# Patient Record
Sex: MALE | NOT HISPANIC OR LATINO | Employment: OTHER | URBAN - METROPOLITAN AREA
[De-identification: names, ages, dates, MRNs, and addresses within clinical notes are randomized per-mention and may not be internally consistent; named-entity substitution may affect disease eponyms.]

---

## 2017-04-03 DIAGNOSIS — F02.80 ALZHEIMER'S DEMENTIA WITHOUT BEHAVIORAL DISTURBANCE: ICD-10-CM

## 2017-04-03 DIAGNOSIS — G30.9 ALZHEIMER'S DEMENTIA WITHOUT BEHAVIORAL DISTURBANCE: ICD-10-CM

## 2017-04-03 RX ORDER — MEMANTINE HYDROCHLORIDE 10 MG/1
TABLET ORAL
Qty: 60 TABLET | Refills: 11 | Status: SHIPPED | OUTPATIENT
Start: 2017-04-03

## 2017-04-28 ENCOUNTER — OFFICE VISIT (OUTPATIENT)
Dept: NEUROLOGY | Facility: CLINIC | Age: 72
End: 2017-04-28
Payer: MEDICARE

## 2017-04-28 VITALS
HEART RATE: 59 BPM | WEIGHT: 189.63 LBS | DIASTOLIC BLOOD PRESSURE: 77 MMHG | SYSTOLIC BLOOD PRESSURE: 136 MMHG | BODY MASS INDEX: 26.55 KG/M2 | HEIGHT: 71 IN

## 2017-04-28 DIAGNOSIS — F41.8 DEPRESSION WITH ANXIETY: ICD-10-CM

## 2017-04-28 DIAGNOSIS — F01.50 MIXED ALZHEIMER'S AND VASCULAR DEMENTIA: ICD-10-CM

## 2017-04-28 DIAGNOSIS — G62.9 NEUROPATHY: ICD-10-CM

## 2017-04-28 DIAGNOSIS — G30.9 MIXED ALZHEIMER'S AND VASCULAR DEMENTIA: ICD-10-CM

## 2017-04-28 DIAGNOSIS — F02.80 MIXED ALZHEIMER'S AND VASCULAR DEMENTIA: ICD-10-CM

## 2017-04-28 DIAGNOSIS — R26.9 GAIT DISORDER: Primary | ICD-10-CM

## 2017-04-28 PROCEDURE — 1159F MED LIST DOCD IN RCRD: CPT | Mod: S$GLB,,, | Performed by: PSYCHIATRY & NEUROLOGY

## 2017-04-28 PROCEDURE — 99999 PR PBB SHADOW E&M-EST. PATIENT-LVL III: CPT | Mod: PBBFAC,,, | Performed by: PSYCHIATRY & NEUROLOGY

## 2017-04-28 PROCEDURE — 1157F ADVNC CARE PLAN IN RCRD: CPT | Mod: S$GLB,,, | Performed by: PSYCHIATRY & NEUROLOGY

## 2017-04-28 PROCEDURE — 1160F RVW MEDS BY RX/DR IN RCRD: CPT | Mod: S$GLB,,, | Performed by: PSYCHIATRY & NEUROLOGY

## 2017-04-28 PROCEDURE — 1125F AMNT PAIN NOTED PAIN PRSNT: CPT | Mod: S$GLB,,, | Performed by: PSYCHIATRY & NEUROLOGY

## 2017-04-28 PROCEDURE — 99214 OFFICE O/P EST MOD 30 MIN: CPT | Mod: S$GLB,,, | Performed by: PSYCHIATRY & NEUROLOGY

## 2017-04-28 RX ORDER — FOLIC ACID 1 MG/1
TABLET ORAL
Refills: 3 | COMMUNITY
Start: 2017-03-27

## 2017-04-28 RX ORDER — FERROUS SULFATE 325(65) MG
1 TABLET ORAL
Refills: 12 | COMMUNITY
Start: 2017-04-25

## 2017-04-28 RX ORDER — GABAPENTIN 100 MG/1
100 CAPSULE ORAL 3 TIMES DAILY
Refills: 5 | COMMUNITY
Start: 2017-04-03

## 2017-04-28 RX ORDER — FENOFIBRIC ACID 135 MG/1
1 CAPSULE, DELAYED RELEASE ORAL DAILY
Refills: 6 | Status: ON HOLD | COMMUNITY
Start: 2017-02-02 | End: 2017-11-22 | Stop reason: CLARIF

## 2017-04-28 NOTE — PATIENT INSTRUCTIONS
Discussed with patient and spouse.  Continue Aricept 10 mg daily in the morning and Namenda 10 mg twice a day.  He is advised magnesium oxide 400 mg at bedtime daily which will help his headaches and also help the restless legs symptoms.  Discussed fall precautions and will prescribe lightweight wheelchair as he has significant difficulty using the walker and out of the house.

## 2017-04-28 NOTE — PROGRESS NOTES
Subjective:       Patient ID: Sung Buckley is a 72 y.o. male.    Chief Complaint:  Memory Loss      History of Present Illness  HPI  This is a 72-year-old man who returns to the clinic for complaints of memory difficulties. He was accompanied by his spouse who is the primary historian as the patient tended to minimize his problems. He has had difficulty with names and words since the beginning of 2014 with gradual progression. He used to have delayed recall but this has been less frequent. In addition he has had difficulty with retention of recent information such as recalling recent conversations or meeting people. He tends to get confused easily.  He had a couple of episodes of passing out a few weeks ago and was admitted to the [facility].  He was seen by [facility] neurology and the possibility of seizures was concerned and he was started on Keppra.  An EEG done however was normal.  The spouse who witness the episode reported no convulsive movements with patient recovering in several minutes.  It was felt that it is probably medication related as he was on morphine patches as well as sleep medications.  These were all discontinued.  The spouse eventually discontinued the Keppra.  He has had no further problems    He is presently on Namenda 10 mg twice a day and Aricept 10 mg in the morning.  He has been having increasing difficulty with taking care of his day-to-day needs including personal hygiene and does need some help with bathing and picking the right clothes to wear.  In addition he has difficulty swallowing solids and now eats meals that are puréed and his medications crushed.  In addition he has been having increasing difficulty with ambulating even with the use of a walker as he gets confused very easily and has difficulty turning.  He has had a couple of minor falls at home.  His spouse is his primary caretaker.  An MRI/MRA scan of the brain done in September 2016 showed no significant change as  compared to a prior MRI done about a year or 2 ago.  He does have a history of restless leg syndrome and reports that he takes Requip.  He is presently on gabapentin for neck problems.       Review of Systems  Review of Systems   Constitutional: Negative.    HENT: Negative.  Negative for hearing loss.    Eyes: Negative.  Negative for visual disturbance.   Respiratory: Negative.  Negative for shortness of breath.    Cardiovascular: Negative.  Negative for chest pain and palpitations.   Gastrointestinal: Negative.    Endocrine: Negative.    Genitourinary: Negative.    Musculoskeletal: Positive for arthralgias. Negative for back pain, gait problem and neck pain.   Skin: Negative.    Allergic/Immunologic: Negative.    Neurological: Negative.  Negative for dizziness, tremors, seizures, syncope, speech difficulty, weakness, numbness and headaches.   Hematological: Negative.    Psychiatric/Behavioral: Positive for decreased concentration and sleep disturbance. Negative for behavioral problems and confusion. The patient is not nervous/anxious.        Objective:      Neurologic Exam      Physical Exam   Constitutional: He appears well-developed and well-nourished.   HENT:   Head: Normocephalic and atraumatic.   Right Ear: Hearing normal.   Left Ear: Hearing normal.   Neck: Normal range of motion. Neck supple. Carotid bruit is not present.   Neurological: He is alert. He has normal reflexes. He displays no atrophy. No cranial nerve deficit (Visual fields at bedside testing essentially normal.  No facial asymmetry noted with facial movements and sensory exam being normal/symmetrical.  Corneals/gag reflexes normal.  Tongue & palate movements normal.  Hearing unimpaired.  Shoulder shrug was norm) or sensory deficit. He exhibits normal muscle tone. He displays a negative Romberg sign. Coordination and gait normal. He displays no Babinski's sign on the right side. He displays no Babinski's sign on the left side.   Mental status  examination: Patient is fully oriented and able to give an adequate history.  Recall of recent and past information is good. Immediate recall 1/3 after 3 minutes.  Difficulty with spelling backwards 2/5.  Impaired judgment and insight.  Discussed with patient and spouse.  It appears that he is gradually declining cognitively.  Processing of information was a little slow. Language functions are intact with no evidence of aphasia or dysarthria.  Comprehension is unimpaired.  Affect is appropriate, mood was even.  No thought disorder is noted.         Assessment:        1. Gait disorder    2. Neuropathy    3. Depression with anxiety    4. Mixed Alzheimer's and vascular dementia             Plan:       Discussed with patient and spouse.  Continue Aricept 10 mg daily in the morning and Namenda 10 mg twice a day.  He is advised magnesium oxide 400 mg at bedtime daily which will help his headaches and also help the restless legs symptoms.  Discussed fall precautions and will prescribe lightweight wheelchair as he has significant difficulty using the walker and out of the house.  Continue with primary care physician for pain management.  He will follow-up in 6 months.

## 2017-10-27 ENCOUNTER — OFFICE VISIT (OUTPATIENT)
Dept: NEUROLOGY | Facility: CLINIC | Age: 72
End: 2017-10-27
Payer: MEDICARE

## 2017-10-27 VITALS
BODY MASS INDEX: 27.62 KG/M2 | HEART RATE: 63 BPM | SYSTOLIC BLOOD PRESSURE: 156 MMHG | WEIGHT: 197.31 LBS | DIASTOLIC BLOOD PRESSURE: 76 MMHG | HEIGHT: 71 IN

## 2017-10-27 DIAGNOSIS — R26.9 GAIT DISORDER: ICD-10-CM

## 2017-10-27 DIAGNOSIS — E78.5 HYPERLIPIDEMIA, UNSPECIFIED HYPERLIPIDEMIA TYPE: ICD-10-CM

## 2017-10-27 DIAGNOSIS — F01.50 MIXED ALZHEIMER'S AND VASCULAR DEMENTIA: Primary | ICD-10-CM

## 2017-10-27 DIAGNOSIS — F02.80 MIXED ALZHEIMER'S AND VASCULAR DEMENTIA: Primary | ICD-10-CM

## 2017-10-27 DIAGNOSIS — G30.9 MIXED ALZHEIMER'S AND VASCULAR DEMENTIA: Primary | ICD-10-CM

## 2017-10-27 PROCEDURE — 99214 OFFICE O/P EST MOD 30 MIN: CPT | Mod: S$GLB,,, | Performed by: PSYCHIATRY & NEUROLOGY

## 2017-10-27 PROCEDURE — 99999 PR PBB SHADOW E&M-EST. PATIENT-LVL III: CPT | Mod: PBBFAC,,, | Performed by: PSYCHIATRY & NEUROLOGY

## 2017-10-27 NOTE — PROGRESS NOTES
Subjective:       Patient ID: Sung Buckley is a 72 y.o. male.    Chief Complaint:  Memory Loss and Gait Problem      History of Present Illness  HPI  This is a 72-year-old male who returns to the clinic for complaints of memory difficulties. He was accompanied by his spouse who is the primary historian as the patient tended to minimize his problems. He has had difficulty with names and words since the beginning of 2014 with gradual progression. In addition he has had difficulty with retention of recent information such as recalling recent conversations or meeting people. He tends to get confused easily.  He had a couple of episodes of passing out earlier 2017 and was admitted to the [facility].  He was seen by [facility] neurology and the possibility of seizures was concerned and he was started on Keppra.  An EEG done however was normal.  The spouse who witness the episode reported no convulsive movements with patient recovering in several minutes.  It was felt that it is probably medication related as he was on morphine patches as well as sleep medications.  These were all discontinued.  The spouse eventually discontinued the Keppra.  He has had no further problems    He is presently on Namenda 10 mg twice a day and Aricept 10 mg in the morning.  He has been having increasing difficulty with taking care of his day-to-day needs including personal hygiene and does need some help with bathing and dressing.  In addition he has difficulty swallowing solids and now eats meals that are puréed and his medications crushed.  In addition he has been having increasing difficulty with ambulating even with the use of a walker as he gets confused very easily and has difficulty turning and has had a few falls.  He is presently only using a wheelchair at home and outside.  An MRI/MRA scan of the brain done in September 2016 showed no significant change as compared to a prior MRI done about a year or 2 ago.         Review of  Systems  Review of Systems   Constitutional: Negative.    HENT: Negative.  Negative for hearing loss.    Eyes: Negative.  Negative for visual disturbance.   Respiratory: Negative.  Negative for shortness of breath.    Cardiovascular: Negative.  Negative for chest pain and palpitations.   Gastrointestinal: Negative.    Endocrine: Negative.    Genitourinary: Negative.    Musculoskeletal: Positive for arthralgias. Negative for back pain, gait problem and neck pain.   Skin: Negative.    Allergic/Immunologic: Negative.    Neurological: Negative.  Negative for dizziness, tremors, seizures, syncope, speech difficulty, weakness, numbness and headaches.   Hematological: Negative.    Psychiatric/Behavioral: Positive for decreased concentration and sleep disturbance. Negative for behavioral problems and confusion. The patient is not nervous/anxious.        Objective:      Neurologic Exam      Physical Exam   Constitutional: He appears well-developed and well-nourished.   HENT:   Head: Normocephalic and atraumatic.   Right Ear: Hearing normal.   Left Ear: Hearing normal.   Neck: Normal range of motion. Neck supple. Carotid bruit is not present.   Neurological: He is alert. He has normal reflexes. He displays no atrophy. No cranial nerve deficit (Visual fields at bedside testing essentially normal.  No facial asymmetry noted with facial movements and sensory exam being normal/symmetrical.  Corneals/gag reflexes normal.  Tongue & palate movements normal.  Hearing unimpaired.  Shoulder shrug was norm) or sensory deficit. He exhibits normal muscle tone. He displays a negative Romberg sign. Coordination and gait normal. He displays no Babinski's sign on the right side. He displays no Babinski's sign on the left side.   Mental status examination: Patient is fully oriented and able to give an adequate history.  Recall of recent and past information is good. Immediate recall 1/3 after 3 minutes.  Difficulty with spelling backwards 2/5.   Impaired judgment and insight.  Discussed with patient and spouse.  It appears that he is gradually declining cognitively.  Processing of information was a little slow. Language functions are intact with no evidence of aphasia or dysarthria.  Comprehension is unimpaired.  Affect is appropriate, mood was even.  No thought disorder is noted.         Assessment:        1. Mixed Alzheimer's and vascular dementia    2. Gait disorder    3. Hyperlipidemia, unspecified hyperlipidemia type             Plan:       Discussed with patient and spouse.  Continue Aricept 10 mg daily in the morning and Namenda 10 mg twice a day.   Discussed fall precautions and mute use of a wheelchair as he has significant difficulty using the walker.  Continue with primary care physician for pain management.  We will get home health services involved to evaluate agent regarding outpatient therapy, speech therapy for speech and swallowing assessment, social work to see will help is available for the family and an aide. He will follow-up in 6 months.

## 2017-10-27 NOTE — PATIENT INSTRUCTIONS
Discussed with patient and spouse.  Continue Aricept 10 mg daily in the morning and Namenda 10 mg twice a day.   Discussed fall precautions and mute use of a wheelchair as he has significant difficulty using the walker.  Continue with primary care physician for pain management.  We will get home health services involved to evaluate agent regarding outpatient therapy, speech therapy for speech and swallowing assessment, social work to see will help is available for the family and an aide.

## 2017-11-01 DIAGNOSIS — R26.89 UNSTABLE BALANCE: ICD-10-CM

## 2017-11-01 DIAGNOSIS — F03.90 SENILE DEMENTIA, UNCOMPLICATED: Primary | ICD-10-CM

## 2017-11-01 DIAGNOSIS — M62.81 GENERALIZED MUSCLE WEAKNESS: ICD-10-CM

## 2017-11-03 ENCOUNTER — TELEPHONE (OUTPATIENT)
Dept: NEUROLOGY | Facility: CLINIC | Age: 72
End: 2017-11-03

## 2017-11-03 NOTE — TELEPHONE ENCOUNTER
----- Message from Jasmine Hines MA sent at 11/2/2017  3:19 PM CDT -----  Contact: heydi Ramírez [facility]      ----- Message -----  From: Thania Clark  Sent: 11/2/2017   3:14 PM  To: Deandre NAYLOR Staff    Desiree is calling to inform you of the denial for home health.  She can be reached at [phone] or [phone] option 2 ext [phone]

## 2017-11-06 ENCOUNTER — TELEPHONE (OUTPATIENT)
Dept: NEUROLOGY | Facility: CLINIC | Age: 72
End: 2017-11-06

## 2017-11-06 NOTE — TELEPHONE ENCOUNTER
----- Message from Pamela Interiano sent at 11/6/2017 10:04 AM CST -----  Contact: Ivis  x 1st Request  _ 2nd Request  _ 3rd Request    Who: Ivis pt wife     Why: Ivis is calling in regards to pt home health care being denied and is requesting to appeal pt home health care for speech therapy. Please call and advise.     What Number to Call Back: [phone]    [facility] [phone]  case ref [id number]     When to Expect a call back: (Before the end of the day)  -- if call after 3:00 call back will be tomorrow.

## 2017-11-06 NOTE — TELEPHONE ENCOUNTER
Peer to peer was done by  and approved for home health care for speech therapy. Wife advised of this.

## 2017-11-08 ENCOUNTER — TELEPHONE (OUTPATIENT)
Dept: NEUROLOGY | Facility: CLINIC | Age: 72
End: 2017-11-08

## 2017-11-08 RX ORDER — GEMFIBROZIL 600 MG/1
600 TABLET, FILM COATED ORAL DAILY
Refills: 3 | COMMUNITY
Start: 2017-11-07

## 2017-11-08 NOTE — TELEPHONE ENCOUNTER
----- Message from Anil Rabago sent at 11/8/2017 11:09 AM CST -----  Contact: Ivis (wife)  X_ 1st Request  _ 2nd Request  _ 3rd Request    Who: Ivis (wife)    Why: Would like to speak with staff in regards to getting clinical notes sent over to the insurance in regards to his hospital bed    What Number to Call Back: [phone]    When to Expect a call back: (Before the end of the day)  -- if call after 3:00 call back will be tomorrow.

## 2017-11-08 NOTE — TELEPHONE ENCOUNTER
David requesting clinical notes stating the need for hospital bed. If not received by the end of the day they will not be able to send to the patient. [employer] Fax # [phone], and Phone # [phone].

## 2017-11-08 NOTE — TELEPHONE ENCOUNTER
Clinical notes from  has been faxed to [facility] F# [phone] as supporting documentation for hospital bed,bedside commode, and wheelchair.

## 2017-11-08 NOTE — TELEPHONE ENCOUNTER
Contacted the [employer] company and advised him that we cannot fax medical information unless we received a formal request by fax or letter.  We'll then fax them a copy of my last notes.  In addition from them that there is no deadline that they would refuse the equipment did not receive the notes by today.  That would be again just any Medicare policy.

## 2017-11-09 ENCOUNTER — TELEPHONE (OUTPATIENT)
Dept: NEUROLOGY | Facility: CLINIC | Age: 72
End: 2017-11-09

## 2017-11-22 ENCOUNTER — ANESTHESIA EVENT (OUTPATIENT)
Dept: SURGERY | Facility: HOSPITAL | Age: 72
DRG: 469 | End: 2017-11-22
Payer: MEDICARE

## 2017-11-22 ENCOUNTER — HOSPITAL ENCOUNTER (INPATIENT)
Facility: HOSPITAL | Age: 72
LOS: 15 days | Discharge: NURSING, REHAB OR LONG-TERM CARE | DRG: 469 | End: 2017-12-07
Attending: EMERGENCY MEDICINE | Admitting: HOSPITALIST
Payer: MEDICARE

## 2017-11-22 DIAGNOSIS — R79.81 LOW OXYGEN SATURATION: ICD-10-CM

## 2017-11-22 DIAGNOSIS — R07.9 LEFT SIDED CHEST PAIN: ICD-10-CM
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
## 2017-11-22 DIAGNOSIS — R13.12 OROPHARYNGEAL DYSPHAGIA: Chronic | ICD-10-CM

## 2017-11-22 DIAGNOSIS — F01.50 MIXED ALZHEIMER'S AND VASCULAR DEMENTIA: Chronic | ICD-10-CM

## 2017-11-22 DIAGNOSIS — Z01.818 PREOP EXAMINATION: ICD-10-CM

## 2017-11-22 DIAGNOSIS — I10 ESSENTIAL HYPERTENSION: ICD-10-CM

## 2017-11-22 DIAGNOSIS — G30.9 MIXED ALZHEIMER'S AND VASCULAR DEMENTIA: Chronic | ICD-10-CM

## 2017-11-22 DIAGNOSIS — F02.80 MIXED ALZHEIMER'S AND VASCULAR DEMENTIA: Chronic | ICD-10-CM

## 2017-11-22 DIAGNOSIS — G40.909 SEIZURE DISORDER: Chronic | ICD-10-CM

## 2017-11-22 DIAGNOSIS — S72.011A CLOSED SUBCAPITAL FRACTURE OF RIGHT FEMUR, INITIAL ENCOUNTER: Primary | ICD-10-CM

## 2017-11-22 DIAGNOSIS — S72.011D CLOSED SUBCAPITAL FRACTURE OF RIGHT FEMUR WITH ROUTINE HEALING: ICD-10-CM

## 2017-11-22 PROBLEM — D72.829 LEUKOCYTOSIS: Status: ACTIVE | Noted: 2017-11-22

## 2017-11-22 LAB
ALBUMIN SERPL BCP-MCNC: 3.9 G/DL
ALP SERPL-CCNC: 83 U/L
ALT SERPL W/O P-5'-P-CCNC: 49 U/L
ANION GAP SERPL CALC-SCNC: 13 MMOL/L
AST SERPL-CCNC: 42 U/L
BACTERIA #/AREA URNS HPF: ABNORMAL /HPF
BASOPHILS # BLD AUTO: 0.02 K/UL
BASOPHILS NFR BLD: 0.1 %
BILIRUB SERPL-MCNC: 0.6 MG/DL
BILIRUB UR QL STRIP: NEGATIVE
BUN SERPL-MCNC: 15 MG/DL
CALCIUM SERPL-MCNC: 9.9 MG/DL
CHLORIDE SERPL-SCNC: 103 MMOL/L
CLARITY UR: CLEAR
CO2 SERPL-SCNC: 24 MMOL/L
COLOR UR: YELLOW
CREAT SERPL-MCNC: 1.2 MG/DL
DIFFERENTIAL METHOD: ABNORMAL
EOSINOPHIL # BLD AUTO: 0 K/UL
EOSINOPHIL NFR BLD: 0.1 %
ERYTHROCYTE [DISTWIDTH] IN BLOOD BY AUTOMATED COUNT: 14.1 %
EST. GFR  (AFRICAN AMERICAN): >60 ML/MIN/1.73 M^2
EST. GFR  (NON AFRICAN AMERICAN): 60 ML/MIN/1.73 M^2
GLUCOSE SERPL-MCNC: 115 MG/DL
GLUCOSE UR QL STRIP: NEGATIVE
HCT VFR BLD AUTO: 49.9 %
HGB BLD-MCNC: 16.7 G/DL
HGB UR QL STRIP: ABNORMAL
HYALINE CASTS #/AREA URNS LPF: 0 /LPF
KETONES UR QL STRIP: NEGATIVE
LEUKOCYTE ESTERASE UR QL STRIP: NEGATIVE
LYMPHOCYTES # BLD AUTO: 1.3 K/UL
LYMPHOCYTES NFR BLD: 8.8 %
MCH RBC QN AUTO: 29.3 PG
MCHC RBC AUTO-ENTMCNC: 33.5 G/DL
MCV RBC AUTO: 88 FL
MICROSCOPIC COMMENT: ABNORMAL
MONOCYTES # BLD AUTO: 1.1 K/UL
MONOCYTES NFR BLD: 7.6 %
NEUTROPHILS # BLD AUTO: 12.2 K/UL
NEUTROPHILS NFR BLD: 82.9 %
NITRITE UR QL STRIP: NEGATIVE
PH UR STRIP: 8 [PH] (ref 5–8)
PLATELET # BLD AUTO: 250 K/UL
PMV BLD AUTO: 9.3 FL
POTASSIUM SERPL-SCNC: 4.4 MMOL/L
PROT SERPL-MCNC: 8.1 G/DL
PROT UR QL STRIP: ABNORMAL
RBC # BLD AUTO: 5.7 M/UL
RBC #/AREA URNS HPF: 5 /HPF (ref 0–4)
SODIUM SERPL-SCNC: 140 MMOL/L
SP GR UR STRIP: 1.01 (ref 1–1.03)
URN SPEC COLLECT METH UR: ABNORMAL
UROBILINOGEN UR STRIP-ACNC: NEGATIVE EU/DL
WBC # BLD AUTO: 14.69 K/UL
WBC #/AREA URNS HPF: 2 /HPF (ref 0–5)

## 2017-11-22 PROCEDURE — 93010 ELECTROCARDIOGRAM REPORT: CPT | Mod: ,,, | Performed by: INTERNAL MEDICINE

## 2017-11-22 PROCEDURE — 25000003 PHARM REV CODE 250: Performed by: HOSPITALIST

## 2017-11-22 PROCEDURE — 96374 THER/PROPH/DIAG INJ IV PUSH: CPT

## 2017-11-22 PROCEDURE — 80053 COMPREHEN METABOLIC PANEL: CPT

## 2017-11-22 PROCEDURE — 25000242 PHARM REV CODE 250 ALT 637 W/ HCPCS: Performed by: EMERGENCY MEDICINE

## 2017-11-22 PROCEDURE — 96376 TX/PRO/DX INJ SAME DRUG ADON: CPT

## 2017-11-22 PROCEDURE — 51702 INSERT TEMP BLADDER CATH: CPT

## 2017-11-22 PROCEDURE — 99285 EMERGENCY DEPT VISIT HI MDM: CPT | Mod: 25

## 2017-11-22 PROCEDURE — 96372 THER/PROPH/DIAG INJ SC/IM: CPT

## 2017-11-22 PROCEDURE — 93005 ELECTROCARDIOGRAM TRACING: CPT

## 2017-11-22 PROCEDURE — 63600175 PHARM REV CODE 636 W HCPCS: Performed by: HOSPITALIST

## 2017-11-22 PROCEDURE — 85025 COMPLETE CBC W/AUTO DIFF WBC: CPT

## 2017-11-22 PROCEDURE — 63600175 PHARM REV CODE 636 W HCPCS: Performed by: EMERGENCY MEDICINE

## 2017-11-22 PROCEDURE — 11000001 HC ACUTE MED/SURG PRIVATE ROOM

## 2017-11-22 PROCEDURE — 81000 URINALYSIS NONAUTO W/SCOPE: CPT

## 2017-11-22 PROCEDURE — 94640 AIRWAY INHALATION TREATMENT: CPT

## 2017-11-22 RX ORDER — MEMANTINE HYDROCHLORIDE 5 MG/1
10 TABLET ORAL NIGHTLY
Status: DISCONTINUED | OUTPATIENT
Start: 2017-11-22 | End: 2017-12-07 | Stop reason: HOSPADM

## 2017-11-22 RX ORDER — OXYCODONE HYDROCHLORIDE 5 MG/1
10 TABLET ORAL EVERY 6 HOURS PRN
Status: DISCONTINUED | OUTPATIENT
Start: 2017-11-22 | End: 2017-11-24

## 2017-11-22 RX ORDER — LEVETIRACETAM 250 MG/1
250 TABLET ORAL 3 TIMES DAILY
Status: DISCONTINUED | OUTPATIENT
Start: 2017-11-22 | End: 2017-12-02

## 2017-11-22 RX ORDER — CEFAZOLIN SODIUM 2 G/50ML
2 SOLUTION INTRAVENOUS
Status: DISCONTINUED | OUTPATIENT
Start: 2017-11-23 | End: 2017-11-23

## 2017-11-22 RX ORDER — FENTANYL CITRATE 50 UG/ML
50 INJECTION, SOLUTION INTRAMUSCULAR; INTRAVENOUS
Status: COMPLETED | OUTPATIENT
Start: 2017-11-22 | End: 2017-11-22

## 2017-11-22 RX ORDER — CITALOPRAM 20 MG/1
20 TABLET, FILM COATED ORAL
Status: DISCONTINUED | OUTPATIENT
Start: 2017-11-24 | End: 2017-12-07 | Stop reason: HOSPADM

## 2017-11-22 RX ORDER — ACETAMINOPHEN 325 MG/1
650 TABLET ORAL EVERY 6 HOURS PRN
Status: DISCONTINUED | OUTPATIENT
Start: 2017-11-22 | End: 2017-11-24

## 2017-11-22 RX ORDER — FERROUS SULFATE 325(65) MG
325 TABLET, DELAYED RELEASE (ENTERIC COATED) ORAL
Status: DISCONTINUED | OUTPATIENT
Start: 2017-11-23 | End: 2017-12-04

## 2017-11-22 RX ORDER — DOCUSATE SODIUM 100 MG/1
100 CAPSULE, LIQUID FILLED ORAL 2 TIMES DAILY
Status: DISCONTINUED | OUTPATIENT
Start: 2017-11-22 | End: 2017-12-07 | Stop reason: HOSPADM

## 2017-11-22 RX ORDER — IPRATROPIUM BROMIDE AND ALBUTEROL SULFATE 2.5; .5 MG/3ML; MG/3ML
3 SOLUTION RESPIRATORY (INHALATION) EVERY 4 HOURS PRN
Status: DISCONTINUED | OUTPATIENT
Start: 2017-11-22 | End: 2017-12-07 | Stop reason: HOSPADM

## 2017-11-22 RX ORDER — LEVETIRACETAM 250 MG/1
500 TABLET ORAL 3 TIMES DAILY
Status: ON HOLD | COMMUNITY
End: 2017-12-06

## 2017-11-22 RX ORDER — LEVOTHYROXINE SODIUM 25 UG/1
25 TABLET ORAL
Status: DISCONTINUED | OUTPATIENT
Start: 2017-11-23 | End: 2017-12-07 | Stop reason: HOSPADM

## 2017-11-22 RX ORDER — PANTOPRAZOLE SODIUM 40 MG/1
40 TABLET, DELAYED RELEASE ORAL DAILY
Status: DISCONTINUED | OUTPATIENT
Start: 2017-11-23 | End: 2017-12-07 | Stop reason: HOSPADM

## 2017-11-22 RX ORDER — TRANEXAMIC ACID 100 MG/ML
1000 INJECTION, SOLUTION INTRAVENOUS
Status: DISCONTINUED | OUTPATIENT
Start: 2017-11-23 | End: 2017-11-23

## 2017-11-22 RX ORDER — MORPHINE SULFATE 10 MG/ML
6 INJECTION INTRAMUSCULAR; INTRAVENOUS; SUBCUTANEOUS EVERY 4 HOURS PRN
Status: DISCONTINUED | OUTPATIENT
Start: 2017-11-22 | End: 2017-11-23

## 2017-11-22 RX ORDER — DONEPEZIL HYDROCHLORIDE 5 MG/1
10 TABLET, FILM COATED ORAL EVERY MORNING
Status: DISCONTINUED | OUTPATIENT
Start: 2017-11-23 | End: 2017-12-07 | Stop reason: HOSPADM

## 2017-11-22 RX ORDER — FOLIC ACID 1 MG/1
1 TABLET ORAL 2 TIMES DAILY
Status: DISCONTINUED | OUTPATIENT
Start: 2017-11-22 | End: 2017-12-07 | Stop reason: HOSPADM

## 2017-11-22 RX ORDER — FENTANYL CITRATE 50 UG/ML
100 INJECTION, SOLUTION INTRAMUSCULAR; INTRAVENOUS
Status: COMPLETED | OUTPATIENT
Start: 2017-11-22 | End: 2017-11-22

## 2017-11-22 RX ORDER — IPRATROPIUM BROMIDE AND ALBUTEROL SULFATE 2.5; .5 MG/3ML; MG/3ML
3 SOLUTION RESPIRATORY (INHALATION)
Status: COMPLETED | OUTPATIENT
Start: 2017-11-22 | End: 2017-11-22

## 2017-11-22 RX ORDER — GEMFIBROZIL 600 MG/1
600 TABLET, FILM COATED ORAL DAILY
Status: DISCONTINUED | OUTPATIENT
Start: 2017-11-23 | End: 2017-12-07 | Stop reason: HOSPADM

## 2017-11-22 RX ORDER — GABAPENTIN 100 MG/1
100 CAPSULE ORAL 3 TIMES DAILY
Status: DISCONTINUED | OUTPATIENT
Start: 2017-11-22 | End: 2017-11-24

## 2017-11-22 RX ORDER — CHOLECALCIFEROL (VITAMIN D3) 25 MCG
1 TABLET,CHEWABLE ORAL DAILY
COMMUNITY

## 2017-11-22 RX ORDER — MORPHINE SULFATE 10 MG/ML
6 INJECTION INTRAMUSCULAR; INTRAVENOUS; SUBCUTANEOUS EVERY 6 HOURS PRN
Status: DISCONTINUED | OUTPATIENT
Start: 2017-11-22 | End: 2017-11-22

## 2017-11-22 RX ORDER — SODIUM CHLORIDE 0.9 % (FLUSH) 0.9 %
3 SYRINGE (ML) INJECTION
Status: DISCONTINUED | OUTPATIENT
Start: 2017-11-22 | End: 2017-12-07 | Stop reason: HOSPADM

## 2017-11-22 RX ORDER — ROSUVASTATIN CALCIUM 10 MG/1
10 TABLET, COATED ORAL
Status: DISCONTINUED | OUTPATIENT
Start: 2017-11-24 | End: 2017-12-07 | Stop reason: HOSPADM

## 2017-11-22 RX ORDER — FERROUS SULFATE 325(65) MG
325 TABLET ORAL
Status: DISCONTINUED | OUTPATIENT
Start: 2017-11-23 | End: 2017-11-22

## 2017-11-22 RX ORDER — SODIUM CHLORIDE 0.9 % (FLUSH) 0.9 %
5 SYRINGE (ML) INJECTION
Status: DISCONTINUED | OUTPATIENT
Start: 2017-11-22 | End: 2017-12-07 | Stop reason: HOSPADM

## 2017-11-22 RX ORDER — SODIUM CHLORIDE 9 MG/ML
INJECTION, SOLUTION INTRAVENOUS CONTINUOUS
Status: DISCONTINUED | OUTPATIENT
Start: 2017-11-23 | End: 2017-11-24

## 2017-11-22 RX ADMIN — LEVETIRACETAM 250 MG: 250 TABLET, FILM COATED ORAL at 09:11

## 2017-11-22 RX ADMIN — MORPHINE SULFATE 6 MG: 10 INJECTION INTRAVENOUS at 09:11

## 2017-11-22 RX ADMIN — FENTANYL CITRATE 50 MCG: 50 INJECTION, SOLUTION INTRAMUSCULAR; INTRAVENOUS at 02:11

## 2017-11-22 RX ADMIN — IPRATROPIUM BROMIDE AND ALBUTEROL SULFATE 3 ML: .5; 3 SOLUTION RESPIRATORY (INHALATION) at 10:11

## 2017-11-22 RX ADMIN — GABAPENTIN 100 MG: 100 CAPSULE ORAL at 09:11

## 2017-11-22 RX ADMIN — FENTANYL CITRATE 50 MCG: 50 INJECTION, SOLUTION INTRAMUSCULAR; INTRAVENOUS at 12:11

## 2017-11-22 RX ADMIN — MEMANTINE HYDROCHLORIDE 10 MG: 5 TABLET ORAL at 09:11

## 2017-11-22 RX ADMIN — FOLIC ACID 1 MG: 1 TABLET ORAL at 09:11

## 2017-11-22 RX ADMIN — FENTANYL CITRATE 100 MCG: 50 INJECTION, SOLUTION INTRAMUSCULAR; INTRAVENOUS at 10:11

## 2017-11-22 RX ADMIN — DOCUSATE SODIUM 100 MG: 100 CAPSULE, LIQUID FILLED ORAL at 09:11

## 2017-11-22 NOTE — ASSESSMENT & PLAN NOTE
Julia ABRAHAM MD spoke with Dr. Hallman who is planning to do surgery tomorrow AM. Bedrest and turn q2 for now. Opiates for pain control. Docusate BID. Start lovenox post-operatively for DVT ppx. PT/OT post-op. Arteaga for now.

## 2017-11-22 NOTE — PROGRESS NOTES
Patient is a new admit from ED.  Patient is awake, alert, and oriented.  Patient's wife is at bedside.  Patient's Arteaga catheter is intact and patent and draining clear yellow urine.  UA sent to Lab.  Patient is on O2 at 3L.  SATS is at 96%.  SCDs applied to bilateral lower extremities.  Safety maintained with bed low, wheels locked and side rails up.  Call light within reach.  Will continue to monitor.

## 2017-11-22 NOTE — ED PROVIDER NOTES
Encounter Date: 11/22/2017       History     Chief Complaint   Patient presents with    Fall     Reports was sitting on side of the bed and slipped while urinating. Reports fell and injured right side of body. Reports abrasions to right knee and right arm. Reports right hipe pain.      HPI   This is a 72 y.o. male who has a past medical history of CTS (carpal tunnel syndrome); Depression; GERD;  Kidney stones; Neuropathy; and Other and unspecified hyperlipidemia.     The patient presents to the Emergency Department with mechanical fall.   Patient reportedly lost his balance while urinating.  He fell to his right side and injured his hip and knee.  Also has scrape to his right hand.  Denies head injury, LOC, headache, dizziness, numbness.  No associated symptoms reported. Wife states that the patient loses his balance often.  Symptoms are aggravated by movement.  Symptoms are relieved by nothing.   Patient has no prior history of similar symptoms.   Pt has a past surgical history that includes Carpal tunnel release.      Review of patient's allergies indicates:  No Known Allergies  Past Medical History:   Diagnosis Date    CTS (carpal tunnel syndrome)     Depression     GERD (gastroesophageal reflux disease)     Kidney stones     Neuropathy     Other and unspecified hyperlipidemia      Past Surgical History:   Procedure Laterality Date    CARPAL TUNNEL RELEASE       Family History   Problem Relation Age of Onset    Diabetes Mother     Neuropathy Mother     Heart disease Father     Diabetes Sister     Diabetes Maternal Grandmother     Diabetes Maternal Grandfather     Diabetes Paternal Grandmother     Diabetes Paternal Grandfather      Social History   Substance Use Topics    Smoking status: Former Smoker    Smokeless tobacco: Never Used      Comment: quit 30 yrs ago    Alcohol use No     Review of Systems   Constitutional: Negative for activity change and fever.   HENT: Negative for sore throat.     Respiratory: Negative for shortness of breath.    Cardiovascular: Negative for chest pain.   Gastrointestinal: Negative for nausea.   Genitourinary: Negative for dysuria.   Musculoskeletal: Positive for arthralgias and gait problem. Negative for back pain.   Skin: Negative for rash.   Neurological: Positive for speech difficulty (chronic). Negative for dizziness, facial asymmetry, weakness, numbness and headaches.   Hematological: Does not bruise/bleed easily.       Physical Exam     Initial Vitals [11/22/17 0931]   BP Pulse Resp Temp SpO2   130/79 77 16 97.6 °F (36.4 °C) (!) 93 %      MAP       96         Physical Exam    Nursing note and vitals reviewed.  Constitutional: He appears well-developed and well-nourished. No distress.   HENT:   Head: Normocephalic and atraumatic.   Mouth/Throat: Oropharynx is clear and moist.   Eyes: Conjunctivae are normal. Pupils are equal, round, and reactive to light.   Neck: Normal range of motion. Neck supple.   Cardiovascular: Normal rate, regular rhythm and normal heart sounds.   Pulmonary/Chest: Breath sounds normal. No respiratory distress. He exhibits no tenderness.   No rib, clavicular or chest wall TTP  Faint wheezing bilaterally   Abdominal: Soft. Bowel sounds are normal. He exhibits no distension. There is no tenderness.   Musculoskeletal: Normal range of motion. He exhibits tenderness (right hip laterally). He exhibits no edema.   Pain with right hip flexion.  No back or spinal TTP.   Lymphadenopathy:     He has no cervical adenopathy.   Neurological: He is alert and oriented to person, place, and time. He has normal strength.   Skin: Skin is warm and dry. Capillary refill takes less than 2 seconds. No rash noted. No erythema.   Psychiatric: He has a normal mood and affect. Thought content normal.         ED Course   Procedures  Labs Reviewed   CBC W/ AUTO DIFFERENTIAL - Abnormal; Notable for the following:        Result Value    WBC 14.69 (*)     Gran # 12.2 (*)      Mono # 1.1 (*)     Gran% 82.9 (*)     Lymph% 8.8 (*)     All other components within normal limits   COMPREHENSIVE METABOLIC PANEL - Abnormal; Notable for the following:     Glucose 115 (*)     AST 42 (*)     ALT 49 (*)     All other components within normal limits             Medical Decision Making:   Initial Assessment:   This is an emergent evaluation of a 72 y.o.male patient with presentation of fall, right hip pain, low O2 sat on room air.   Initial differentials include but are not limited to: Hip contusion, fracture, head injury, subdural hematoma, pneumothorax, COPD, aspiration.   Plan: Oxygen, DuoNeb, chest x-ray, right hip x-ray, right knee x-ray, pain medicine  Clinical Tests:   Radiological Study: Ordered and Reviewed    Workup was remarkable for subcapital fracture on CT of the right hip.   Other x-rays and head CT were unremarkable for acute pathology.  Patient will need admission for further care including orthopedic consultation and likely ORIF versus percutaneous pinning.    Case discussed with Dr. Duke, [facility] medicine, who will accept thepatient to his service and consult orthopedics.    Clinical impression and plan discussed with patient and family.                         ED Course as of Nov 22 1744   Wed Nov 22, 2017   1436 Pt informed of hip fracture and need for admission.  [NP]      ED Course User Index  [NP] Alexander Parisi MD     Clinical Impression:     1. Closed subcapital fracture of right femur, initial encounter    2. Fall    3. Low oxygen saturation                                 Alexander Parisi MD  11/22/17 4676

## 2017-11-22 NOTE — SUBJECTIVE & OBJECTIVE
Past Medical History:   Diagnosis Date    CTS (carpal tunnel syndrome)     Depression     GERD (gastroesophageal reflux disease)     Kidney stones     Neuropathy     Other and unspecified hyperlipidemia        Past Surgical History:   Procedure Laterality Date    CARPAL TUNNEL RELEASE         Review of patient's allergies indicates:  No Known Allergies    No current facility-administered medications on file prior to encounter.      Current Outpatient Prescriptions on File Prior to Encounter   Medication Sig    azelastine (ASTELIN) 137 mcg (0.1 %) nasal spray USE 1-2 SPRAYS IN EACH NASAL TWICE A DAY FOR CONGESTION    citalopram (CELEXA) 20 MG tablet Take 20 mg by mouth every Mon, Wed, Fri.     CRESTOR 10 mg tablet Take 10 mg by mouth every Mon, Wed, Fri.     docusate sodium (COLACE) 100 MG capsule Take 1 capsule (100 mg total) by mouth 2 (two) times daily. (Patient taking differently: Take 100 mg by mouth 2 (two) times daily as needed for Constipation. )    donepezil (ARICEPT) 10 MG tablet TAKE 1 TABLET BY MOUTH EVERY MORNING WITH FOOD    esomeprazole (NEXIUM) 20 MG capsule Take 20 mg by mouth once daily.     ferrous sulfate 325 mg (65 mg iron) Tab tablet Take 1 tablet by mouth 3 (three) times daily with meals.    folic acid (FOLVITE) 1 MG tablet TAKE 1 TABLET BY MOUTH TWICE A DAY FOR NEUROPATHY    gabapentin (NEURONTIN) 100 MG capsule Take 100 mg by mouth 3 (three) times daily.     gemfibrozil (LOPID) 600 MG tablet Take 600 mg by mouth once daily.    levothyroxine (SYNTHROID) 25 MCG tablet Take 1 tablet (25 mcg total) by mouth before breakfast.    memantine (NAMENDA) 10 MG Tab TAKE 1 TABLET BY MOUTH TWICE A DAY    [DISCONTINUED] fenofibric acid (FIBRICOR) 135 mg CpDR Take 1 capsule by mouth once daily.    [DISCONTINUED] tramadol (ULTRAM) 50 mg tablet Take 1 tablet (50 mg total) by mouth every 8 (eight) hours as needed for Pain.     Family History     Problem Relation (Age of Onset)    Diabetes  Mother, Sister, Maternal Grandmother, Maternal Grandfather, Paternal Grandmother, Paternal Grandfather    Heart disease Father    Neuropathy Mother        Social History Main Topics    Smoking status: Former Smoker    Smokeless tobacco: Never Used      Comment: quit 30 yrs ago    Alcohol use No    Drug use: No    Sexual activity: Not on file     Review of Systems  Objective:     Vital Signs (Most Recent):  Temp: 98.6 °F (37 °C) (11/22/17 1640)  Pulse: 105 (11/22/17 1640)  Resp: (!) 25 (11/22/17 1640)  BP: (!) 160/73 (11/22/17 1640)  SpO2: (!) 94 % (11/22/17 1644) Vital Signs (24h Range):  Temp:  [97.6 °F (36.4 °C)-98.6 °F (37 °C)] 98.6 °F (37 °C)  Pulse:  [] 105  Resp:  [16-25] 25  SpO2:  [88 %-95 %] 94 %  BP: (122-160)/(68-80) 160/73     Weight: 89.8 kg (198 lb)  Body mass index is 27.62 kg/m².    Physical Exam     Significant Labs:   CBC:   Recent Labs  Lab 11/22/17  1338   WBC 14.69*   HGB 16.7   HCT 49.9        CMP:   Recent Labs  Lab 11/22/17  1338      K 4.4      CO2 24   *   BUN 15   CREATININE 1.2   CALCIUM 9.9   PROT 8.1   ALBUMIN 3.9   BILITOT 0.6   ALKPHOS 83   AST 42*   ALT 49*   ANIONGAP 13   EGFRNONAA 60       Significant Imaging: CXR: I have reviewed all pertinent results/findings within the past 24 hours and my personal findings are:  No focal consolidation or infiltrate  I have reviewed all pertinent imaging results/findings within the past 24 hours.     CT right LE: Minimally displaced subcapital right femoral neck fracture.

## 2017-11-22 NOTE — ED NOTES
Family noticed blood to Urethra after voiding. Urine appears dark yellow, no obvious blood noted. MD notified.

## 2017-11-22 NOTE — ED NOTES
Pt and family updated on plan of care. Verbalizes understanding. Denies needs/complaints at this time. NAD noted.

## 2017-11-22 NOTE — H&P
[facility] Hospital Medicine  History & Physical    Patient Name: Sung Buckley  MRN: [id number]  Admission Date: 11/22/2017  Attending Physician: Dimitrios Duke MD  Primary Care Provider: Maren Monterroso MD         Patient information was obtained from patient, spouse/SO, past medical records and ER records.     Subjective:     Principal Problem:Closed subcapital fracture of right femur with routine healing    Chief Complaint:   Chief Complaint   Patient presents with    Fall     Reports was sitting on side of the bed and slipped while urinating. Reports fell and injured right side of body. Reports abrasions to right knee and right arm. Reports right hipe pain.         HPI: Mr. Buckley is a 71 yo WM with mixed vascular and Alzheimer's dementia, HLD, and neuropathy that presented to [facility] for evaluation of right hip pain after a fall at home. His wife reports that she was cooking breakfast when she heard him calling out for her and complaining of pain. He sleeps in a hospital bed and the rails were up at the time. She found him on the floor and he says that he was trying to get up to go to the bathroom. She says that he normally calls for her assistance, but he did not this time. He has a walker and wheelchair that he uses to get around the house with and the wheelchair was beside his bed, but he was not using it at this time. He denies any loss of consciousness, light headedness, palpitations or chest pain prior to the fall. He complains of pain in the right hip that is constant, sharp, and 8/10 in intensity.     Past Medical History:   Diagnosis Date    CTS (carpal tunnel syndrome)     Depression     GERD (gastroesophageal reflux disease)     Kidney stones     Neuropathy     Other and unspecified hyperlipidemia        Past Surgical History:   Procedure Laterality Date    CARPAL TUNNEL RELEASE         Review of patient's allergies indicates:  No Known Allergies    No current  facility-administered medications on file prior to encounter.      Current Outpatient Prescriptions on File Prior to Encounter   Medication Sig    azelastine (ASTELIN) 137 mcg (0.1 %) nasal spray USE 1-2 SPRAYS IN EACH NASAL TWICE A DAY FOR CONGESTION    citalopram (CELEXA) 20 MG tablet Take 20 mg by mouth every Mon, Wed, Fri.     CRESTOR 10 mg tablet Take 10 mg by mouth every Mon, Wed, Fri.     docusate sodium (COLACE) 100 MG capsule Take 1 capsule (100 mg total) by mouth 2 (two) times daily. (Patient taking differently: Take 100 mg by mouth 2 (two) times daily as needed for Constipation. )    donepezil (ARICEPT) 10 MG tablet TAKE 1 TABLET BY MOUTH EVERY MORNING WITH FOOD    esomeprazole (NEXIUM) 20 MG capsule Take 20 mg by mouth once daily.     ferrous sulfate 325 mg (65 mg iron) Tab tablet Take 1 tablet by mouth 3 (three) times daily with meals.    folic acid (FOLVITE) 1 MG tablet TAKE 1 TABLET BY MOUTH TWICE A DAY FOR NEUROPATHY    gabapentin (NEURONTIN) 100 MG capsule Take 100 mg by mouth 3 (three) times daily.     gemfibrozil (LOPID) 600 MG tablet Take 600 mg by mouth once daily.    levothyroxine (SYNTHROID) 25 MCG tablet Take 1 tablet (25 mcg total) by mouth before breakfast.    memantine (NAMENDA) 10 MG Tab TAKE 1 TABLET BY MOUTH TWICE A DAY    [DISCONTINUED] fenofibric acid (FIBRICOR) 135 mg CpDR Take 1 capsule by mouth once daily.    [DISCONTINUED] tramadol (ULTRAM) 50 mg tablet Take 1 tablet (50 mg total) by mouth every 8 (eight) hours as needed for Pain.     Family History     Problem Relation (Age of Onset)    Diabetes Mother, Sister, Maternal Grandmother, Maternal Grandfather, Paternal Grandmother, Paternal Grandfather    Heart disease Father    Neuropathy Mother        Social History Main Topics    Smoking status: Former Smoker    Smokeless tobacco: Never Used      Comment: quit 30 yrs ago    Alcohol use No    Drug use: No    Sexual activity: Not on file     Review of  Systems  Objective:     Vital Signs (Most Recent):  Temp: 98.6 °F (37 °C) (11/22/17 1640)  Pulse: 105 (11/22/17 1640)  Resp: (!) 25 (11/22/17 1640)  BP: (!) 160/73 (11/22/17 1640)  SpO2: (!) 94 % (11/22/17 1644) Vital Signs (24h Range):  Temp:  [97.6 °F (36.4 °C)-98.6 °F (37 °C)] 98.6 °F (37 °C)  Pulse:  [] 105  Resp:  [16-25] 25  SpO2:  [88 %-95 %] 94 %  BP: (122-160)/(68-80) 160/73     Weight: 89.8 kg (198 lb)  Body mass index is 27.62 kg/m².    Physical Exam     Significant Labs:   CBC:   Recent Labs  Lab 11/22/17  1338   WBC 14.69*   HGB 16.7   HCT 49.9        CMP:   Recent Labs  Lab 11/22/17  1338      K 4.4      CO2 24   *   BUN 15   CREATININE 1.2   CALCIUM 9.9   PROT 8.1   ALBUMIN 3.9   BILITOT 0.6   ALKPHOS 83   AST 42*   ALT 49*   ANIONGAP 13   EGFRNONAA 60       Significant Imaging: CXR: I have reviewed all pertinent results/findings within the past 24 hours and my personal findings are:  No focal consolidation or infiltrate  I have reviewed all pertinent imaging results/findings within the past 24 hours.     CT right LE: Minimally displaced subcapital right femoral neck fracture.    Assessment/Plan:     * Closed subcapital fracture of right femur with routine healing    Fall  ED MD spoke with Dr. Hallman who is planning to do surgery tomorrow AM. Bedrest and turn q2 for now. Opiates for pain control. Docusate BID. Start lovenox post-operatively for DVT ppx. PT/OT post-op. Arteaga for now.         Seizure disorder    Continue keppra at 250 mg TID        Leukocytosis    Will monitor. Had recent UTI. Check UA.           Iron deficiency anemia secondary to inadequate dietary iron intake    Ferritin was 16 in 2016. Continue iron supplement.           Polyneuropathy    Gait disorder  Continue gabapentin, folic acid and B12. PT/OT post-operatively.           Depression with anxiety    Continue citalopram.           Mixed Alzheimer's and vascular dementia    Dysphagia  Continue  namenda and aricept. On dental soft diet with thin liquids at home.           Mixed hyperlipidemia    Continue rosuvastatin and gemfibrozil.             VTE Risk Mitigation         Ordered     High Risk of VTE  Once      11/22/17 1723     Place sequential compression device  Until discontinued      11/22/17 1723     Place DG hose  Until discontinued      11/22/17 1723     Place sequential compression device  Until discontinued      11/22/17 1655     Place DG hose  Until discontinued      11/22/17 1655           Pre-operative Evaluation  Mr. Sung Buckley is a 72 y.o. male who will be undergoing a hip fracture repair which is a(n) Moderate Risk cardiac procedure, and has the following:  · An exercise tolerance which is less than four METs.  · Clinical factors that are Low/Moderate Risk.  · No current signs/symptoms of unstable coronary syndrome, decompensated heart failure, significant arrhythmias, nor severe valvular disease.     The patient is currenlty medically optimized.       Although there are both elevated clinical and surgical risk, the patient is currently without any serious cardiac symptoms/signs.  It is recommended to PROCEED WITH URGENT SURGERY.  The benefit of surgery outweighs the risk of surgery.      Dimitrios Duke MD  Department of Hospital Medicine   [facility]

## 2017-11-23 ENCOUNTER — ANESTHESIA (OUTPATIENT)
Dept: SURGERY | Facility: HOSPITAL | Age: 72
DRG: 469 | End: 2017-11-23
Payer: MEDICARE

## 2017-11-23 PROBLEM — J15.9 BACTERIAL PNEUMONIA: Status: ACTIVE | Noted: 2017-11-23

## 2017-11-23 PROBLEM — D72.829 LEUKOCYTOSIS: Status: RESOLVED | Noted: 2017-11-22 | Resolved: 2017-11-23

## 2017-11-23 PROBLEM — Z96.649 S/P HIP HEMIARTHROPLASTY: Status: ACTIVE | Noted: 2017-11-23

## 2017-11-23 LAB
ANION GAP SERPL CALC-SCNC: 10 MMOL/L
APTT BLDCRRT: 31.7 SEC
BUN SERPL-MCNC: 16 MG/DL
CALCIUM SERPL-MCNC: 9.4 MG/DL
CHLORIDE SERPL-SCNC: 102 MMOL/L
CO2 SERPL-SCNC: 26 MMOL/L
CREAT SERPL-MCNC: 1.3 MG/DL
ERYTHROCYTE [DISTWIDTH] IN BLOOD BY AUTOMATED COUNT: 14.1 %
EST. GFR  (AFRICAN AMERICAN): >60 ML/MIN/1.73 M^2
EST. GFR  (NON AFRICAN AMERICAN): 54 ML/MIN/1.73 M^2
GLUCOSE SERPL-MCNC: 142 MG/DL
HCT VFR BLD AUTO: 49.2 %
HGB BLD-MCNC: 16.2 G/DL
INR PPP: 1.1
MAGNESIUM SERPL-MCNC: 2.1 MG/DL
MCH RBC QN AUTO: 29 PG
MCHC RBC AUTO-ENTMCNC: 32.9 G/DL
MCV RBC AUTO: 88 FL
PHOSPHATE SERPL-MCNC: 2.9 MG/DL
PLATELET # BLD AUTO: 203 K/UL
PMV BLD AUTO: 9.1 FL
POTASSIUM SERPL-SCNC: 4.2 MMOL/L
PROTHROMBIN TIME: 11.4 SEC
RBC # BLD AUTO: 5.59 M/UL
SODIUM SERPL-SCNC: 138 MMOL/L
WBC # BLD AUTO: 10.84 K/UL

## 2017-11-23 PROCEDURE — 83735 ASSAY OF MAGNESIUM: CPT

## 2017-11-23 PROCEDURE — 84100 ASSAY OF PHOSPHORUS: CPT

## 2017-11-23 PROCEDURE — 63600175 PHARM REV CODE 636 W HCPCS

## 2017-11-23 PROCEDURE — 25000003 PHARM REV CODE 250: Performed by: ANESTHESIOLOGY

## 2017-11-23 PROCEDURE — 37000008 HC ANESTHESIA 1ST 15 MINUTES

## 2017-11-23 PROCEDURE — 86580 TB INTRADERMAL TEST: CPT

## 2017-11-23 PROCEDURE — 36000710

## 2017-11-23 PROCEDURE — 71000039 HC RECOVERY, EACH ADD'L HOUR

## 2017-11-23 PROCEDURE — 25000003 PHARM REV CODE 250: Performed by: HOSPITALIST

## 2017-11-23 PROCEDURE — 85610 PROTHROMBIN TIME: CPT

## 2017-11-23 PROCEDURE — 36000711

## 2017-11-23 PROCEDURE — 71000033 HC RECOVERY, INTIAL HOUR

## 2017-11-23 PROCEDURE — 87040 BLOOD CULTURE FOR BACTERIA: CPT

## 2017-11-23 PROCEDURE — 37000009 HC ANESTHESIA EA ADD 15 MINS

## 2017-11-23 PROCEDURE — 63600175 PHARM REV CODE 636 W HCPCS: Performed by: HOSPITALIST

## 2017-11-23 PROCEDURE — 25000003 PHARM REV CODE 250

## 2017-11-23 PROCEDURE — 63600175 PHARM REV CODE 636 W HCPCS: Performed by: NURSE PRACTITIONER

## 2017-11-23 PROCEDURE — 20000000 HC ICU ROOM

## 2017-11-23 PROCEDURE — 85027 COMPLETE CBC AUTOMATED: CPT

## 2017-11-23 PROCEDURE — 25000003 PHARM REV CODE 250: Performed by: NURSE PRACTITIONER

## 2017-11-23 PROCEDURE — 63600175 PHARM REV CODE 636 W HCPCS: Performed by: ANESTHESIOLOGY

## 2017-11-23 PROCEDURE — 88305 TISSUE EXAM BY PATHOLOGIST: CPT | Mod: 26,,, | Performed by: PATHOLOGY

## 2017-11-23 PROCEDURE — 88305 TISSUE EXAM BY PATHOLOGIST: CPT | Performed by: PATHOLOGY

## 2017-11-23 PROCEDURE — 80048 BASIC METABOLIC PNL TOTAL CA: CPT

## 2017-11-23 PROCEDURE — 36415 COLL VENOUS BLD VENIPUNCTURE: CPT

## 2017-11-23 PROCEDURE — C1776 JOINT DEVICE (IMPLANTABLE): HCPCS

## 2017-11-23 PROCEDURE — 0SRR01Z REPLACEMENT OF RIGHT HIP JOINT, FEMORAL SURFACE WITH METAL SYNTHETIC SUBSTITUTE, OPEN APPROACH: ICD-10-PCS

## 2017-11-23 PROCEDURE — 88311 DECALCIFY TISSUE: CPT | Mod: 26,,, | Performed by: PATHOLOGY

## 2017-11-23 PROCEDURE — 85730 THROMBOPLASTIN TIME PARTIAL: CPT

## 2017-11-23 DEVICE — STEM FEM 12/14 TAPR HIGH SZ4: Type: IMPLANTABLE DEVICE | Site: HIP | Status: FUNCTIONAL

## 2017-11-23 DEVICE — IMPLANTABLE DEVICE: Type: IMPLANTABLE DEVICE | Site: HIP | Status: FUNCTIONAL

## 2017-11-23 DEVICE — HEAD FEMORAL 28MM: Type: IMPLANTABLE DEVICE | Site: HIP | Status: FUNCTIONAL

## 2017-11-23 RX ORDER — HYDROMORPHONE HYDROCHLORIDE 2 MG/ML
INJECTION, SOLUTION INTRAMUSCULAR; INTRAVENOUS; SUBCUTANEOUS
Status: DISCONTINUED | OUTPATIENT
Start: 2017-11-23 | End: 2017-11-23

## 2017-11-23 RX ORDER — SODIUM CHLORIDE 0.9 % (FLUSH) 0.9 %
3 SYRINGE (ML) INJECTION
Status: DISCONTINUED | OUTPATIENT
Start: 2017-11-23 | End: 2017-11-23 | Stop reason: HOSPADM

## 2017-11-23 RX ORDER — GLYCOPYRROLATE 0.2 MG/ML
INJECTION INTRAMUSCULAR; INTRAVENOUS
Status: DISCONTINUED | OUTPATIENT
Start: 2017-11-23 | End: 2017-11-23

## 2017-11-23 RX ORDER — HYDROMORPHONE HYDROCHLORIDE 2 MG/ML
0.2 INJECTION, SOLUTION INTRAMUSCULAR; INTRAVENOUS; SUBCUTANEOUS
Status: DISCONTINUED | OUTPATIENT
Start: 2017-11-23 | End: 2017-11-24

## 2017-11-23 RX ORDER — ONDANSETRON 2 MG/ML
INJECTION INTRAMUSCULAR; INTRAVENOUS
Status: DISCONTINUED | OUTPATIENT
Start: 2017-11-23 | End: 2017-11-23

## 2017-11-23 RX ORDER — TRANEXAMIC ACID 100 MG/ML
INJECTION, SOLUTION INTRAVENOUS
Status: DISCONTINUED | OUTPATIENT
Start: 2017-11-23 | End: 2017-11-23

## 2017-11-23 RX ORDER — MUPIROCIN 20 MG/G
1 OINTMENT TOPICAL 2 TIMES DAILY
Status: DISPENSED | OUTPATIENT
Start: 2017-11-23 | End: 2017-11-28

## 2017-11-23 RX ORDER — SODIUM CHLORIDE, SODIUM LACTATE, POTASSIUM CHLORIDE, CALCIUM CHLORIDE 600; 310; 30; 20 MG/100ML; MG/100ML; MG/100ML; MG/100ML
INJECTION, SOLUTION INTRAVENOUS CONTINUOUS PRN
Status: DISCONTINUED | OUTPATIENT
Start: 2017-11-23 | End: 2017-11-23

## 2017-11-23 RX ORDER — ETOMIDATE 2 MG/ML
INJECTION INTRAVENOUS
Status: DISCONTINUED | OUTPATIENT
Start: 2017-11-23 | End: 2017-11-23

## 2017-11-23 RX ORDER — TRANEXAMIC ACID 100 MG/ML
1000 INJECTION, SOLUTION INTRAVENOUS
Status: DISCONTINUED | OUTPATIENT
Start: 2017-11-23 | End: 2017-11-23

## 2017-11-23 RX ORDER — FENTANYL CITRATE 50 UG/ML
INJECTION, SOLUTION INTRAMUSCULAR; INTRAVENOUS
Status: DISCONTINUED | OUTPATIENT
Start: 2017-11-23 | End: 2017-11-23

## 2017-11-23 RX ORDER — ONDANSETRON 8 MG/1
8 TABLET, ORALLY DISINTEGRATING ORAL EVERY 6 HOURS PRN
Status: DISCONTINUED | OUTPATIENT
Start: 2017-11-23 | End: 2017-12-07 | Stop reason: HOSPADM

## 2017-11-23 RX ORDER — ONDANSETRON 2 MG/ML
4 INJECTION INTRAMUSCULAR; INTRAVENOUS DAILY PRN
Status: DISCONTINUED | OUTPATIENT
Start: 2017-11-23 | End: 2017-11-23 | Stop reason: HOSPADM

## 2017-11-23 RX ORDER — ONDANSETRON 2 MG/ML
8 INJECTION INTRAMUSCULAR; INTRAVENOUS ONCE
Status: DISCONTINUED | OUTPATIENT
Start: 2017-11-23 | End: 2017-11-23 | Stop reason: HOSPADM

## 2017-11-23 RX ORDER — OXYCODONE HYDROCHLORIDE 5 MG/1
5 TABLET ORAL
Status: DISCONTINUED | OUTPATIENT
Start: 2017-11-23 | End: 2017-11-23 | Stop reason: HOSPADM

## 2017-11-23 RX ORDER — SODIUM CHLORIDE 0.9 % (FLUSH) 0.9 %
3 SYRINGE (ML) INJECTION EVERY 8 HOURS
Status: DISCONTINUED | OUTPATIENT
Start: 2017-11-23 | End: 2017-11-23 | Stop reason: HOSPADM

## 2017-11-23 RX ORDER — ONDANSETRON 2 MG/ML
4 INJECTION INTRAMUSCULAR; INTRAVENOUS EVERY 6 HOURS PRN
Status: DISCONTINUED | OUTPATIENT
Start: 2017-11-23 | End: 2017-12-07 | Stop reason: HOSPADM

## 2017-11-23 RX ORDER — HYDROMORPHONE HYDROCHLORIDE 2 MG/ML
0.2 INJECTION, SOLUTION INTRAMUSCULAR; INTRAVENOUS; SUBCUTANEOUS EVERY 5 MIN PRN
Status: DISCONTINUED | OUTPATIENT
Start: 2017-11-23 | End: 2017-11-23 | Stop reason: HOSPADM

## 2017-11-23 RX ORDER — LIDOCAINE HCL/PF 100 MG/5ML
SYRINGE (ML) INTRAVENOUS
Status: DISCONTINUED | OUTPATIENT
Start: 2017-11-23 | End: 2017-11-23

## 2017-11-23 RX ORDER — ACETAMINOPHEN 650 MG/1
650 SUPPOSITORY RECTAL EVERY 6 HOURS PRN
Status: DISCONTINUED | OUTPATIENT
Start: 2017-11-23 | End: 2017-11-24

## 2017-11-23 RX ORDER — PHENYLEPHRINE HYDROCHLORIDE 10 MG/ML
INJECTION INTRAVENOUS
Status: DISCONTINUED | OUTPATIENT
Start: 2017-11-23 | End: 2017-11-23

## 2017-11-23 RX ORDER — NEOSTIGMINE METHYLSULFATE 1 MG/ML
INJECTION, SOLUTION INTRAVENOUS
Status: DISCONTINUED | OUTPATIENT
Start: 2017-11-23 | End: 2017-11-23

## 2017-11-23 RX ORDER — SUCCINYLCHOLINE CHLORIDE 20 MG/ML
INJECTION INTRAMUSCULAR; INTRAVENOUS
Status: DISCONTINUED | OUTPATIENT
Start: 2017-11-23 | End: 2017-11-23

## 2017-11-23 RX ORDER — DIPHENHYDRAMINE HYDROCHLORIDE 50 MG/ML
25 INJECTION INTRAMUSCULAR; INTRAVENOUS EVERY 6 HOURS PRN
Status: DISCONTINUED | OUTPATIENT
Start: 2017-11-23 | End: 2017-11-23 | Stop reason: HOSPADM

## 2017-11-23 RX ORDER — ROCURONIUM BROMIDE 10 MG/ML
INJECTION, SOLUTION INTRAVENOUS
Status: DISCONTINUED | OUTPATIENT
Start: 2017-11-23 | End: 2017-11-23

## 2017-11-23 RX ORDER — LEVETIRACETAM 5 MG/ML
500 INJECTION INTRAVASCULAR EVERY 12 HOURS
Status: DISCONTINUED | OUTPATIENT
Start: 2017-11-23 | End: 2017-11-24

## 2017-11-23 RX ORDER — KETOROLAC TROMETHAMINE 30 MG/ML
15 INJECTION, SOLUTION INTRAMUSCULAR; INTRAVENOUS EVERY 6 HOURS PRN
Status: DISCONTINUED | OUTPATIENT
Start: 2017-11-23 | End: 2017-11-24

## 2017-11-23 RX ORDER — HYDROMORPHONE HYDROCHLORIDE 1 MG/ML
0.2 INJECTION, SOLUTION INTRAMUSCULAR; INTRAVENOUS; SUBCUTANEOUS
Status: DISCONTINUED | OUTPATIENT
Start: 2017-11-23 | End: 2017-11-24

## 2017-11-23 RX ORDER — ENOXAPARIN SODIUM 100 MG/ML
30 INJECTION SUBCUTANEOUS EVERY 24 HOURS
Status: DISCONTINUED | OUTPATIENT
Start: 2017-11-24 | End: 2017-12-02

## 2017-11-23 RX ORDER — HYDROMORPHONE HYDROCHLORIDE 2 MG/ML
0.5 INJECTION, SOLUTION INTRAMUSCULAR; INTRAVENOUS; SUBCUTANEOUS EVERY 5 MIN PRN
Status: DISCONTINUED | OUTPATIENT
Start: 2017-11-23 | End: 2017-11-23 | Stop reason: HOSPADM

## 2017-11-23 RX ORDER — ESMOLOL HYDROCHLORIDE 10 MG/ML
INJECTION INTRAVENOUS
Status: DISCONTINUED | OUTPATIENT
Start: 2017-11-23 | End: 2017-11-23

## 2017-11-23 RX ADMIN — SODIUM CHLORIDE: 0.9 INJECTION, SOLUTION INTRAVENOUS at 12:11

## 2017-11-23 RX ADMIN — PHENYLEPHRINE HYDROCHLORIDE 50 MCG: 10 INJECTION INTRAVENOUS at 09:11

## 2017-11-23 RX ADMIN — ONDANSETRON 4 MG: 2 INJECTION, SOLUTION INTRAMUSCULAR; INTRAVENOUS at 08:11

## 2017-11-23 RX ADMIN — SUCCINYLCHOLINE CHLORIDE 100 MG: 20 INJECTION, SOLUTION INTRAMUSCULAR; INTRAVENOUS at 07:11

## 2017-11-23 RX ADMIN — NEOSTIGMINE METHYLSULFATE 5 MG: 1 INJECTION INTRAVENOUS at 09:11

## 2017-11-23 RX ADMIN — ROCURONIUM BROMIDE 25 MG: 10 INJECTION, SOLUTION INTRAVENOUS at 07:11

## 2017-11-23 RX ADMIN — PHENYLEPHRINE HYDROCHLORIDE 50 MCG: 10 INJECTION INTRAVENOUS at 08:11

## 2017-11-23 RX ADMIN — EPHEDRINE SULFATE 10 MG: 50 INJECTION, SOLUTION INTRAMUSCULAR; INTRAVENOUS; SUBCUTANEOUS at 09:11

## 2017-11-23 RX ADMIN — ETOMIDATE 15 MG: 2 INJECTION, SOLUTION INTRAVENOUS at 07:11

## 2017-11-23 RX ADMIN — LEVOTHYROXINE SODIUM 25 MCG: 25 TABLET ORAL at 06:11

## 2017-11-23 RX ADMIN — HYDROMORPHONE HYDROCHLORIDE 0.4 MG: 2 INJECTION INTRAMUSCULAR; INTRAVENOUS; SUBCUTANEOUS at 09:11

## 2017-11-23 RX ADMIN — TRANEXAMIC ACID 1000 MG: 100 INJECTION, SOLUTION INTRAVENOUS at 08:11

## 2017-11-23 RX ADMIN — EPHEDRINE SULFATE 10 MG: 50 INJECTION, SOLUTION INTRAMUSCULAR; INTRAVENOUS; SUBCUTANEOUS at 08:11

## 2017-11-23 RX ADMIN — LIDOCAINE HYDROCHLORIDE 80 MG: 20 INJECTION, SOLUTION INTRAVENOUS at 07:11

## 2017-11-23 RX ADMIN — HYDROMORPHONE HYDROCHLORIDE 0.2 MG: 2 INJECTION INTRAMUSCULAR; INTRAVENOUS; SUBCUTANEOUS at 09:11

## 2017-11-23 RX ADMIN — TUBERCULIN PURIFIED PROTEIN DERIVATIVE 5 UNITS: 5 INJECTION INTRADERMAL at 03:11

## 2017-11-23 RX ADMIN — LEVETIRACETAM 250 MG: 250 TABLET, FILM COATED ORAL at 06:11

## 2017-11-23 RX ADMIN — DEXTROSE 2 G: 50 INJECTION, SOLUTION INTRAVENOUS at 08:11

## 2017-11-23 RX ADMIN — ESMOLOL HYDROCHLORIDE 30 MG: 10 INJECTION, SOLUTION INTRAVENOUS at 09:11

## 2017-11-23 RX ADMIN — KETOROLAC TROMETHAMINE 15 MG: 30 INJECTION, SOLUTION INTRAMUSCULAR at 09:11

## 2017-11-23 RX ADMIN — GLYCOPYRROLATE 0.8 MG: 0.2 INJECTION, SOLUTION INTRAMUSCULAR; INTRAVENOUS at 09:11

## 2017-11-23 RX ADMIN — HYDROMORPHONE HYDROCHLORIDE 0.2 MG: 2 INJECTION INTRAMUSCULAR; INTRAVENOUS; SUBCUTANEOUS at 10:11

## 2017-11-23 RX ADMIN — PIPERACILLIN AND TAZOBACTAM 4.5 G: 4; .5 INJECTION, POWDER, LYOPHILIZED, FOR SOLUTION INTRAVENOUS; PARENTERAL at 03:11

## 2017-11-23 RX ADMIN — LEVETIRACETAM 500 MG: 500 INJECTION, SOLUTION INTRAVENOUS at 09:11

## 2017-11-23 RX ADMIN — MUPIROCIN 1 G: 20 OINTMENT TOPICAL at 08:11

## 2017-11-23 RX ADMIN — SODIUM CHLORIDE, SODIUM LACTATE, POTASSIUM CHLORIDE, AND CALCIUM CHLORIDE: .6; .31; .03; .02 INJECTION, SOLUTION INTRAVENOUS at 09:11

## 2017-11-23 RX ADMIN — PIPERACILLIN AND TAZOBACTAM 4.5 G: 4; .5 INJECTION, POWDER, LYOPHILIZED, FOR SOLUTION INTRAVENOUS; PARENTERAL at 10:11

## 2017-11-23 RX ADMIN — TRANEXAMIC ACID 1000 MG: 100 INJECTION, SOLUTION INTRAVENOUS at 09:11

## 2017-11-23 RX ADMIN — FENTANYL CITRATE 250 MCG: 50 INJECTION, SOLUTION INTRAMUSCULAR; INTRAVENOUS at 07:11

## 2017-11-23 RX ADMIN — SODIUM CHLORIDE, SODIUM LACTATE, POTASSIUM CHLORIDE, AND CALCIUM CHLORIDE: .6; .31; .03; .02 INJECTION, SOLUTION INTRAVENOUS at 07:11

## 2017-11-23 RX ADMIN — GABAPENTIN 100 MG: 100 CAPSULE ORAL at 06:11

## 2017-11-23 RX ADMIN — ACETAMINOPHEN 650 MG: 650 SUPPOSITORY RECTAL at 08:11

## 2017-11-23 RX ADMIN — ROCURONIUM BROMIDE 5 MG: 10 INJECTION, SOLUTION INTRAVENOUS at 07:11

## 2017-11-23 RX ADMIN — ROCURONIUM BROMIDE 10 MG: 10 INJECTION, SOLUTION INTRAVENOUS at 09:11

## 2017-11-23 RX ADMIN — DONEPEZIL HYDROCHLORIDE 10 MG: 5 TABLET, FILM COATED ORAL at 06:11

## 2017-11-23 NOTE — ANESTHESIA PREPROCEDURE EVALUATION
"                                                                                                             11/22/2017  Sung Buckley is a 72 y.o., male w/ dementia, GERD, HLD, and neuropathy with right hip fx scheduled for repair tomorrow.  Chronic condition with difficulty swallowing noticeable during interview.  Interview from spouse.    Past Medical History:   Diagnosis Date    CTS (carpal tunnel syndrome)     Depression     GERD (gastroesophageal reflux disease)     Kidney stones     Neuropathy     Other and unspecified hyperlipidemia         HTN       Rheumatoid arthritis - all over including neck       Polymyalgia rheumatica - "dormant"    Past Surgical History:   Procedure Laterality Date    CARPAL TUNNEL RELEASE       EF 55 - 65 55             (  2016 )   Diastolic Dysfunction  Yes     Mitral Valve Mobility  NORMAL    Tricuspid Valve Regurgitation  TRIVIAL      Impression       No radiographic evidence of acute chest disease or significant change compared to 9/22/16.           From Hospitalist Dr. Fenton note   Pre-operative Evaluation  Mr. Sung Buckley is a 72 y.o. male who will be undergoing a hip fracture repair which is a(n) Moderate Risk cardiac procedure, and has the following:  · An exercise tolerance which is less than four METs.  · Clinical factors that are Low/Moderate Risk.  · No current signs/symptoms of unstable coronary syndrome, decompensated heart failure, significant arrhythmias, nor severe valvular disease.      The patient is currenlty medically optimized.        Although there are both elevated clinical and surgical risk, the patient is currently without any serious cardiac symptoms/signs.  It is recommended to PROCEED WITH URGENT SURGERY.  The benefit of surgery outweighs the risk of surgery.    Anesthesia Evaluation    I have reviewed the Patient Summary Reports.    I have reviewed the Nursing Notes.   I have reviewed the Medications.     Review of Systems  Anesthesia Hx:  No " problems with previous Anesthesia  History of prior surgery of interest to airway management or planning: Denies Family Hx of Anesthesia complications.    Social:  Non-Smoker, No Alcohol Use    Hematology/Oncology:         -- Anemia:   Cardiovascular:   Exercise tolerance: poor    Pulmonary:   Former smoker   Renal/:   renal calculi    Hepatic/GI:   GERD dysphagia   Neurological:   Chronic Pain Syndrome  Peripheral Neuropathy  Dementia (vascular)    Endocrine:   Hypothyroidism    Psych:   Psychiatric History anxiety depression          Physical Exam  General:  Well nourished    Airway/Jaw/Neck:  Airway Findings: Mouth Opening: Small, but > 3cm Tongue: Normal  General Airway Assessment: Adult  Mallampati: III  TM Distance: 4 - 6 cm  Jaw/Neck Findings:  Neck ROM: Extension Decreased, Mild      Dental:  Dental Findings: Periodontal disease, Severe   Chest/Lungs:  Chest/Lungs Findings: Clear to auscultation, Normal Respiratory Rate     Heart/Vascular:  Heart Findings: Rate: Normal  Rhythm: Regular Rhythm  Sounds: Normal        Mental Status:  Mental Status Findings:  Flat Affect, Cooperative       Lab Results   Component Value Date    WBC 14.69 (H) 11/22/2017    HGB 16.7 11/22/2017    HCT 49.9 11/22/2017     11/22/2017    CHOL 245 (H) 09/22/2016    TRIG 353 (H) 09/22/2016    HDL 37 (L) 09/22/2016    ALT 49 (H) 11/22/2017    AST 42 (H) 11/22/2017     11/22/2017    K 4.4 11/22/2017     11/22/2017    CREATININE 1.2 11/22/2017    BUN 15 11/22/2017    CO2 24 11/22/2017    TSH 9.509 (H) 09/22/2016    INR 1.0 09/22/2016    HGBA1C 6.6 (H) 09/23/2016     EKG 09/2016  Normal sinus rhythm  Normal ECG  When compared with ECG of 24-SEP-2016 00:06,  No significant change was found      Anesthesia Plan  Type of Anesthesia, risks & benefits discussed:  Anesthesia Type:  MAC, general, epidural, CSE, spinal  Patient's Preference:   Intra-op Monitoring Plan:   Intra-op Monitoring Plan Comments:   Post Op Pain Control  Plan:   Post Op Pain Control Plan Comments:   Induction:    Beta Blocker:  Patient is not currently on a Beta-Blocker (No further documentation required).       Informed Consent: Patient representative understands risks and agrees with Anesthesia plan.  Questions answered. Anesthesia consent signed with patient representative.  ASA Score: 3     Day of Surgery Review of History & Physical: I have interviewed and examined the patient. I have reviewed the patient's H&P dated:  There are no significant changes.          Ready For Surgery From Anesthesia Perspective.

## 2017-11-23 NOTE — PLAN OF CARE
Pt sleepy, grimaces, but does not open eyes. No verbal responses. Breathing well.   1026 Weaned him to 4L O2 NC, sats dropped to 88. Pulse ox site changed.   1029 weaned up to 5L O2 NC. Sats dropped to 85. Pulse ox site changed again.   1038 Placed back to 6L O2 simple face mask. sats remain 84, so  1039 weaned up to 10L O2 simple face mask, after 3-4 minutes sats 92%.   1046 sats 96% weaned down to 6L O2 simple face mask.   1047 Dr Miller notified of events. Notified pt has little reserve and takes a while to recover. I stated he may possibly need to stay in ICU for further monitoring. Dr Miller agreed and said to call Dr Hallman.   1048 I called Dr Hallman. He stated he agreed that he may need closer monitoring and asked me to call Dr Duke.   1050 I notified Dr Duke. He stated  He will come and look at him. VSS. @ this time. Cont to monitor.

## 2017-11-23 NOTE — TRANSFER OF CARE
"Anesthesia Transfer of Care Note    Patient: Sung Buckley    Procedure(s) Performed: Procedure(s) (LRB):  ARTHROPLASTY-HIP-BURT (Right)    Patient location: PACU    Anesthesia Type: general    Transport from OR: Transported from OR on 6-10 L/min O2 by face mask with adequate spontaneous ventilation    Post pain: adequate analgesia    Post assessment: no apparent anesthetic complications    Post vital signs: stable    Level of consciousness: lethargic    Nausea/Vomiting: no nausea/vomiting    Complications: Tachycardia    Transfer of care protocol was followed      Last vitals:   Visit Vitals  /64   Pulse (!) 131   Temp (!) 38.2 °C (100.8 °F) (Oral)   Resp 12   Ht 5' 11" (1.803 m)   Wt 89.8 kg (198 lb)   SpO2 (!) 94%   BMI 27.62 kg/m²     "

## 2017-11-23 NOTE — PLAN OF CARE
VSS. No changes noted. Remains on 6L O2 via simple face mask. O2 sats remain 95%. Still sleepy, opens eyes to stimulation, but falls back asleep. Wife bacilio updated, cont to monitor.

## 2017-11-23 NOTE — PLAN OF CARE
Dr Duke updated. Family updated. VSS. No changes noted. Report given to MITCH Sweet with time allotted for questions.

## 2017-11-23 NOTE — SUBJECTIVE & OBJECTIVE
Interval History: Taken to OR this AM. Tachycardic and hypoxic post-operatively. Received 3 liters of saline and had 700 mL of blood loss during the procedure. Still somewhat sedated. Had low grade fever this AM.     Review of Systems   Unable to perform ROS: Patient unresponsive     Objective:     Vital Signs (Most Recent):  Temp: (!) 100.8 °F (38.2 °C) (11/23/17 0534)  Pulse: (!) 114 (11/23/17 1445)  Resp: 17 (11/23/17 1445)  BP: 137/68 (11/23/17 1445)  SpO2: 95 % (11/23/17 1445) Vital Signs (24h Range):  Temp:  [98.1 °F (36.7 °C)-100.8 °F (38.2 °C)] 100.8 °F (38.2 °C)  Pulse:  [] 114  Resp:  [12-25] 17  SpO2:  [84 %-99 %] 95 %  BP: (115-162)/(58-94) 137/68     Weight: 89.8 kg (198 lb)  Body mass index is 27.62 kg/m².    Intake/Output Summary (Last 24 hours) at 11/23/17 1520  Last data filed at 11/23/17 1511   Gross per 24 hour   Intake             2515 ml   Output             1760 ml   Net              755 ml      Physical Exam   Constitutional: He appears well-developed and well-nourished. He appears lethargic. No distress. He is sedated. Face mask in place.   Cardiovascular: Normal rate and regular rhythm.    No murmur heard.  Pulmonary/Chest: Effort normal. No respiratory distress. He has rhonchi.   Abdominal: Soft. Bowel sounds are normal. He exhibits no distension. There is no tenderness.   Musculoskeletal: He exhibits no edema or tenderness.   Neurological: He appears lethargic.   Skin: Skin is warm and dry.   Nursing note and vitals reviewed.      Significant Labs:   CBC:   Recent Labs  Lab 11/22/17  1338 11/23/17  0443   WBC 14.69* 10.84   HGB 16.7 16.2   HCT 49.9 49.2    203     CMP:   Recent Labs  Lab 11/22/17  1338 11/23/17  0443    138   K 4.4 4.2    102   CO2 24 26   * 142*   BUN 15 16   CREATININE 1.2 1.3   CALCIUM 9.9 9.4   PROT 8.1  --    ALBUMIN 3.9  --    BILITOT 0.6  --    ALKPHOS 83  --    AST 42*  --    ALT 49*  --    ANIONGAP 13 10   EGFRNONAA 60 54*      Magnesium:   Recent Labs  Lab 11/23/17  0443   MG 2.1       Significant Imaging: CXR: I have reviewed all pertinent results/findings within the past 24 hours and my personal findings are:  Increased LLL infiltrate and bilateral interstial markings.

## 2017-11-23 NOTE — PROGRESS NOTES
[facility] Hospital Medicine  Progress Note    Patient Name: Sung Buckley  MRN: [id number]  Patient Class: IP- Inpatient   Admission Date: 11/22/2017  Length of Stay: 1 days  Attending Physician: Dimitrios Duke MD  Primary Care Provider: Maren Monterroso MD        Subjective:     Principal Problem:Closed subcapital fracture of right femur with routine healing    HPI:  Mr. Buckley is a 71 yo WM with mixed vascular and Alzheimer's dementia, HLD, and neuropathy that presented to [facility] for evaluation of right hip pain after a fall at home. His wife reports that she was cooking breakfast when she heard him calling out for her and complaining of pain. He sleeps in a hospital bed and the rails were up at the time. She found him on the floor and he says that he was trying to get up to go to the bathroom. She says that he normally calls for her assistance, but he did not this time. He has a walker and wheelchair that he uses to get around the house with and the wheelchair was beside his bed, but he was not using it at this time. He denies any loss of consciousness, light headedness, palpitations or chest pain prior to the fall. He complains of pain in the right hip that is constant, sharp, and 8/10 in intensity.     Hospital Course:  No notes on file    Interval History: Taken to OR this AM. Tachycardic and hypoxic post-operatively. Received 3 liters of saline and had 700 mL of blood loss during the procedure. Still somewhat sedated. Had low grade fever this AM.     Review of Systems   Unable to perform ROS: Patient unresponsive     Objective:     Vital Signs (Most Recent):  Temp: (!) 100.8 °F (38.2 °C) (11/23/17 0534)  Pulse: (!) 114 (11/23/17 1445)  Resp: 17 (11/23/17 1445)  BP: 137/68 (11/23/17 1445)  SpO2: 95 % (11/23/17 1445) Vital Signs (24h Range):  Temp:  [98.1 °F (36.7 °C)-100.8 °F (38.2 °C)] 100.8 °F (38.2 °C)  Pulse:  [] 114  Resp:  [12-25] 17  SpO2:  [84 %-99 %] 95 %  BP:  (115-162)/(58-94) 137/68     Weight: 89.8 kg (198 lb)  Body mass index is 27.62 kg/m².    Intake/Output Summary (Last 24 hours) at 11/23/17 1520  Last data filed at 11/23/17 1511   Gross per 24 hour   Intake             2515 ml   Output             1760 ml   Net              755 ml      Physical Exam   Constitutional: He appears well-developed and well-nourished. He appears lethargic. No distress. He is sedated. Face mask in place.   Cardiovascular: Normal rate and regular rhythm.    No murmur heard.  Pulmonary/Chest: Effort normal. No respiratory distress. He has rhonchi.   Abdominal: Soft. Bowel sounds are normal. He exhibits no distension. There is no tenderness.   Musculoskeletal: He exhibits no edema or tenderness.   Neurological: He appears lethargic.   Skin: Skin is warm and dry.   Nursing note and vitals reviewed.      Significant Labs:   CBC:   Recent Labs  Lab 11/22/17  1338 11/23/17  0443   WBC 14.69* 10.84   HGB 16.7 16.2   HCT 49.9 49.2    203     CMP:   Recent Labs  Lab 11/22/17  1338 11/23/17  0443    138   K 4.4 4.2    102   CO2 24 26   * 142*   BUN 15 16   CREATININE 1.2 1.3   CALCIUM 9.9 9.4   PROT 8.1  --    ALBUMIN 3.9  --    BILITOT 0.6  --    ALKPHOS 83  --    AST 42*  --    ALT 49*  --    ANIONGAP 13 10   EGFRNONAA 60 54*     Magnesium:   Recent Labs  Lab 11/23/17  0443   MG 2.1       Significant Imaging: CXR: I have reviewed all pertinent results/findings within the past 24 hours and my personal findings are:  Increased LLL infiltrate and bilateral interstial markings.    Assessment/Plan:      * Closed subcapital fracture of right femur with routine healing    S/p right hemiarthroplasty  Fall  Dr. Hallman performed hemiarthroplasty today. Turn q2. Opiates for pain control. Docusate BID. Start lovenox for DVT ppx. PT/OT post-op. Arteaga for now.         Bacterial pneumonia    Possible developing LLL pneumonia. Will get blood cultures and start on zosyn to cover possible  aspiration. Was requiring supplemental oxygen on admission, so may have become more pronounced with IV fluids.           Seizure disorder    Continue keppra at 250 mg TID        Iron deficiency anemia secondary to inadequate dietary iron intake    Ferritin was 16 in 2016. Continue iron supplement.           Polyneuropathy    Gait disorder  Continue gabapentin, folic acid and B12. PT/OT post-operatively.           Depression with anxiety    Continue citalopram.           Mixed Alzheimer's and vascular dementia    Dysphagia  Continue namenda and aricept. On dental soft diet with thin liquids at home. At high risk for delirium.           Mixed hyperlipidemia    Continue rosuvastatin and gemfibrozil.             VTE Risk Mitigation         Ordered     enoxaparin injection 30 mg  Daily     Route:  Subcutaneous        11/23/17 1242     Place sequential compression device  Until discontinued      11/23/17 1242     High Risk of VTE  Once      11/23/17 1242          Critical care time spent on the evaluation and treatment of severe organ dysfunction, review of pertinent labs and imaging studies, discussions with consulting providers and discussions with patient/family: 35 minutes.    Dimitrios Duke MD  Department of Hospital Medicine   [facility]

## 2017-11-23 NOTE — ANESTHESIA POSTPROCEDURE EVALUATION
"Anesthesia Post Evaluation    Patient: Sung Buckley    Procedure(s) Performed: Procedure(s) (LRB):  ARTHROPLASTY-HIP-BURT (Right)    Final Anesthesia Type: general  Patient location during evaluation: ICU  Patient participation: Yes- Able to Participate  Level of consciousness: awake and alert  Post-procedure vital signs: reviewed and stable  Pain management: adequate  Airway patency: patent  PONV status at discharge: No PONV  Anesthetic complications: no      Cardiovascular status: hemodynamically stable  Respiratory status: spontaneous ventilation and face mask  Hydration status: euvolemic  Follow-up not needed.        Visit Vitals  /66   Pulse 105   Temp (!) 38.2 °C (100.8 °F) (Oral)   Resp 13   Ht 5' 11" (1.803 m)   Wt 89.8 kg (198 lb)   SpO2 95%   BMI 27.62 kg/m²       Pain/Ha Score: Pain Assessment Performed: Yes (11/23/2017 12:00 PM)  Presence of Pain: non-verbal indicators absent (11/23/2017 12:00 PM)  Pain Rating Prior to Med Admin: 10 (11/22/2017  9:25 PM)  Ha Score: 7 (11/23/2017 12:00 PM)      "

## 2017-11-23 NOTE — BRIEF OP NOTE
[facility]  Brief Operative Note    SUMMARY     Surgery Date: 11/23/2017     Surgeon(s) and Role:     * Abimael Hallman MD - Primary    Assisting Surgeon: None    Pre-op Diagnosis:  Fall [W19.XXXA]  Displaced femoral neck fracture    Post-op Diagnosis:  Post-Op Diagnosis Codes:     * Fall [W19.XXXA]  Displaced femoral neck fracture    Procedure(s) (LRB):  ARTHROPLASTY-HIP-JOHN (Right)    Anesthesia: General    Description of Procedure: Bipolar john-arthroplasty hip    Description of the findings of the procedure: displaced fx    Estimated Blood Loss: 300 mL         Specimens:   Specimen (12h ago through future)    Start     Ordered    11/23/17 0917  Specimen to Pathology - Surgery  Once     Comments:  1. Femoral head - perm      11/23/17 0917

## 2017-11-23 NOTE — PT/OT/SLP PROGRESS
Occupational Therapy      Sung Buckley  MRN: [id number]    Patient not seen today secondary to  just out of surgery; hold at this time; tachycardia. Will follow-up .    Pilo Quiroz OT  11/23/2017

## 2017-11-23 NOTE — ASSESSMENT & PLAN NOTE
Dysphagia  Continue namenda and aricept. On dental soft diet with thin liquids at home. At high risk for delirium.

## 2017-11-23 NOTE — ASSESSMENT & PLAN NOTE
Possible developing LLL pneumonia. Will get blood cultures and start on zosyn to cover possible aspiration. Was requiring supplemental oxygen on admission, so may have become more pronounced with IV fluids.

## 2017-11-23 NOTE — ASSESSMENT & PLAN NOTE
S/p right hemiarthroplasty  Fall  Dr. Hallman performed hemiarthroplasty today. Turn q2. Opiates for pain control. Docusate BID. Start lovenox for DVT ppx. PT/OT post-op. Jenni for now.

## 2017-11-23 NOTE — PLAN OF CARE
Problem: Patient Care Overview  Goal: Plan of Care Review  Pt received on documented flow, no apparent distress noted. Will cont to monitor.

## 2017-11-23 NOTE — PLAN OF CARE
Problem: Patient Care Overview  Goal: Plan of Care Review  Outcome: Ongoing (interventions implemented as appropriate)  Pt AAOx4. Respirations even, unlabored on 4L N/C sats @ 94%. Pts pain controlled on current pain regime. bed alarm set. Informed pt and wife of NPO status after MN. Encouraged pt to call for assistance. Call light within reach. No distress noted. Safety maintained. Will continue to monitor.

## 2017-11-23 NOTE — NURSING
Pt arrived to  with PACU nurse. Pt tachycardic and only opens eyes to voice. MD at bedside to assess. Will cont to monitor.

## 2017-11-23 NOTE — OP NOTE
DATE OF PROCEDURE:  11/23/2017    PREOPERATIVE DIAGNOSES:  1.  Right displaced femoral neck fracture.  2.  Fall.  3.  Senile dementia.    POSTOPERATIVE DIAGNOSES:  1.  Right displaced femoral neck fracture.  2.  Fall.  3.  Senile dementia.    PROCEDURE:  Hemiarthroplasty, right hip.    SURGEON:  Abimael Hallman M.D.    ANESTHESIA:  General.    ESTIMATED BLOOD LOSS:  300 mL    SPECIMENS:  Bone to Pathology.    COMPLICATIONS:  None.    INDICATIONS:  Mr. Mcclelland is a 72-year-old gentleman, ambulatory who fell   yesterday sustaining the above injury.  Initial x-rays were reported to me   difficult to determine fracture; however, the patient has obvious femoral neck   fracture.  CT scans revealed the fracture was displaced approximately 50%   posteriorly.  Informed consent was obtained from the patient's wife discussing   risks, benefits, and alternatives of surgery as noted on the consent form.  The   patient and wife wished to proceed with surgery as the patient was ambulatory   prior to this fall.  Surgical site marking was performed in the holding area   prior to anesthesia and timeout was performed in the Operating Room prior making   skin incision.    PROCEDURE IN DETAIL:  The patient was taken to the Operating Room, placed on the   operating table in supine position.  After an adequate level of anesthesia was   obtained, the patient was turned to the left lateral decubitus position with the   right hip up.  The hip was stabilized with the hip positioner and right hip   area was prepped and draped in usual sterile fashion.  Preoperative prophylactic   IV Ancef and tranexamic acid were given likely timeout was performed and   standard anterior lateral exposure to the hip was made.  Incision was carried   sharply through skin, subcutaneous tissue and fascia.  Deep retractors were   inserted and then incision was made along the anterior fascial portion of the   gluteus medius extending approximately 4 mm to 5 mm into  the gluteus medius   proximally and just to the superior vastus lateralis distally.  The gluteus   minimus was retracted the anterior hip capsule was exposed and capsulotomy was   performed.  Femoral neck was exposed as the neck was long the femoral neck cut   was made at the appropriate level and the femoral head was removed.  Acetabulum   was cleaned and inspected.  There was no significant advanced degenerative   changes of the acetabulum.  Therefore, I decided to proceed with the bipolar   hemiarthroplasty.  Ligamentum teres femoris was anticoagulated and excised.  The   hip was then brought in figure-of-four position and femoral canal was opened   and reamed sequentially up to 4 mm and then broached for a size 4 component.    The hip was trialed with a 55 mm femoral head component, which was equivalent to   the patient's femoral head and was stable and balanced with a 1.5 mm neck.    After stability was checked and leg length checked.  Trial components were   removed.  The wounds were irrigated and then approximately porous coated femoral   stem was impacted in place.  The Trevizo taper was cleaned and dried and then the   bipolar femoral head was impacted on the Trevizo taper and the hip was reduced.    Once skin was checked for stability was stable and balanced and then the capsule   was repaired in an interrupted fashion with #2 Orthocord.  The abductor   mechanism was likewise repaired to itself with #2 Orthocord.  The fascia was   repaired in a running fashion with #1 Vicryl.  Subcutaneous was closed with 2-0   Vicryl and skin in a running subcuticular fashion with 3-0 Monocryl.  Next   Prineo tape with Dermabond was applied over the incision.  One this was dried.    Sterile dressing was applied.  The patient was returned to supine position,   awakened and taken to Recovery Room in stable condition.  No complications.    PLAN:  Early mobilization, SCDs and Lovenox for DVT prophylaxis.  Resume   physical  therapy tomorrow.  Blood loss was 300 mL    COMPONENTS UTILIZED:  PalindromXuy Somervell proximally porous-coated femoral stem size 4   standard self-centering bipolar head, 28 mm inside diameter 55 mm outside   diameter with an  ____ femoral head size 28 mm 1.5 mm neck with a 12/14 taper.      ANNA/IN  dd: 11/23/2017 10:03:58 (CST)  td: 11/23/2017 10:54:42 (CST)  Doc ID   #[id number]  Job ID #[id number]    CC:

## 2017-11-23 NOTE — PLAN OF CARE
PT waking up more. Opens eyes, Does not follow commands. Weaned to 5 L O@ NC, sats dropped to 91%, placed back on simple face mask @ 6L. O2 sats 94%. Cont to monitor.

## 2017-11-23 NOTE — ANESTHESIA PREPROCEDURE EVALUATION
"                                                                                                             11/23/2017  Sung Buckley is a 72 y.o., male w/ dementia, GERD, HLD, and neuropathy with right hip fx scheduled for repair today.  Chronic condition with difficulty swallowing noticeable during interview.  Dr. Santizo Interviewed patient's spouse.    Past Medical History:   Diagnosis Date    CTS (carpal tunnel syndrome)     Depression     GERD (gastroesophageal reflux disease)     Kidney stones     Neuropathy     Other and unspecified hyperlipidemia         HTN       Rheumatoid arthritis - all over including neck       Polymyalgia rheumatica - "dormant"    Past Surgical History:   Procedure Laterality Date    CARPAL TUNNEL RELEASE       EF 55 - 65 55             (  2016 )   Diastolic Dysfunction  Yes     Mitral Valve Mobility  NORMAL    Tricuspid Valve Regurgitation  TRIVIAL      Impression       No radiographic evidence of acute chest disease or significant change compared to 9/22/16.           From Hospitalist Dr. Fenton note   Pre-operative Evaluation  Mr. Sung Buckley is a 72 y.o. male who will be undergoing a hip fracture repair which is a(n) Moderate Risk cardiac procedure, and has the following:  · An exercise tolerance which is less than four METs.  · Clinical factors that are Low/Moderate Risk.  · No current signs/symptoms of unstable coronary syndrome, decompensated heart failure, significant arrhythmias, nor severe valvular disease.      The patient is currenlty medically optimized.        Although there are both elevated clinical and surgical risk, the patient is currently without any serious cardiac symptoms/signs.  It is recommended to PROCEED WITH URGENT SURGERY.  The benefit of surgery outweighs the risk of surgery.    Anesthesia Evaluation    I have reviewed the Patient Summary Reports.    I have reviewed the Nursing Notes.   I have reviewed the Medications.     Review of " Systems  Anesthesia Hx:  No problems with previous Anesthesia  History of prior surgery of interest to airway management or planning: Denies Family Hx of Anesthesia complications.    Social:  Non-Smoker, No Alcohol Use    Hematology/Oncology:         -- Anemia:   Cardiovascular:   Exercise tolerance: poor    Pulmonary:   Former smoker   Renal/:   renal calculi    Hepatic/GI:   GERD dysphagia   Neurological:   Chronic Pain Syndrome  Peripheral Neuropathy  Dementia (vascular)    Endocrine:   Hypothyroidism    Psych:   Psychiatric History anxiety depression          Physical Exam  General:  Well nourished    Airway/Jaw/Neck:  Airway Findings: Mouth Opening: Small, but > 3cm Tongue: Normal  General Airway Assessment: Adult  Mallampati: III  TM Distance: 4 - 6 cm  Jaw/Neck Findings:  Neck ROM: Extension Decreased, Mild      Dental:  Dental Findings: Periodontal disease, Severe   Chest/Lungs:  Chest/Lungs Findings: Clear to auscultation, Normal Respiratory Rate     Heart/Vascular:  Heart Findings: Rate: Normal  Rhythm: Regular Rhythm  Sounds: Normal        Mental Status:  Mental Status Findings:  Flat Affect, Cooperative       Lab Results   Component Value Date    WBC 10.84 11/23/2017    HGB 16.2 11/23/2017    HCT 49.2 11/23/2017     11/23/2017    CHOL 245 (H) 09/22/2016    TRIG 353 (H) 09/22/2016    HDL 37 (L) 09/22/2016    ALT 49 (H) 11/22/2017    AST 42 (H) 11/22/2017     11/23/2017    K 4.2 11/23/2017     11/23/2017    CREATININE 1.3 11/23/2017    BUN 16 11/23/2017    CO2 26 11/23/2017    TSH 9.509 (H) 09/22/2016    INR 1.1 11/23/2017    HGBA1C 6.6 (H) 09/23/2016     EKG 09/2016  Normal sinus rhythm  Normal ECG  When compared with ECG of 24-SEP-2016 00:06,  No significant change was found      Anesthesia Plan  Type of Anesthesia, risks & benefits discussed:  Anesthesia Type:  MAC, general, epidural, CSE, spinal  Patient's Preference:   Intra-op Monitoring Plan:   Intra-op Monitoring Plan Comments:    Post Op Pain Control Plan:   Post Op Pain Control Plan Comments:   Induction:    Beta Blocker:  Patient is not currently on a Beta-Blocker (No further documentation required).       Informed Consent: Patient representative understands risks and agrees with Anesthesia plan.  Questions answered. Anesthesia consent signed with patient representative.  ASA Score: 3     Day of Surgery Review of History & Physical: I have interviewed and examined the patient. I have reviewed the patient's H&P dated:  There are no significant changes.          Ready For Surgery From Anesthesia Perspective.

## 2017-11-24 PROBLEM — E83.39 HYPOPHOSPHATEMIA: Status: ACTIVE | Noted: 2017-11-24

## 2017-11-24 LAB
ANION GAP SERPL CALC-SCNC: 8 MMOL/L
BUN SERPL-MCNC: 16 MG/DL
CALCIUM SERPL-MCNC: 8.3 MG/DL
CHLORIDE SERPL-SCNC: 105 MMOL/L
CO2 SERPL-SCNC: 24 MMOL/L
CREAT SERPL-MCNC: 1.2 MG/DL
ERYTHROCYTE [DISTWIDTH] IN BLOOD BY AUTOMATED COUNT: 14.3 %
EST. GFR  (AFRICAN AMERICAN): >60 ML/MIN/1.73 M^2
EST. GFR  (NON AFRICAN AMERICAN): 60 ML/MIN/1.73 M^2
GLUCOSE SERPL-MCNC: 137 MG/DL
HCT VFR BLD AUTO: 40.6 %
HGB BLD-MCNC: 13.4 G/DL
MAGNESIUM SERPL-MCNC: 1.7 MG/DL
MCH RBC QN AUTO: 28.9 PG
MCHC RBC AUTO-ENTMCNC: 33 G/DL
MCV RBC AUTO: 88 FL
PHOSPHATE SERPL-MCNC: 2 MG/DL
PLATELET # BLD AUTO: 155 K/UL
PMV BLD AUTO: 9.1 FL
POTASSIUM SERPL-SCNC: 4 MMOL/L
RBC # BLD AUTO: 4.64 M/UL
SODIUM SERPL-SCNC: 137 MMOL/L
WBC # BLD AUTO: 12.75 K/UL

## 2017-11-24 PROCEDURE — G8982 BODY POS GOAL STATUS: HCPCS | Mod: CM

## 2017-11-24 PROCEDURE — 63600175 PHARM REV CODE 636 W HCPCS

## 2017-11-24 PROCEDURE — 25000003 PHARM REV CODE 250: Performed by: HOSPITALIST

## 2017-11-24 PROCEDURE — 36415 COLL VENOUS BLD VENIPUNCTURE: CPT

## 2017-11-24 PROCEDURE — 94761 N-INVAS EAR/PLS OXIMETRY MLT: CPT

## 2017-11-24 PROCEDURE — 97530 THERAPEUTIC ACTIVITIES: CPT

## 2017-11-24 PROCEDURE — 63600175 PHARM REV CODE 636 W HCPCS: Performed by: NURSE PRACTITIONER

## 2017-11-24 PROCEDURE — 97165 OT EVAL LOW COMPLEX 30 MIN: CPT

## 2017-11-24 PROCEDURE — 63600175 PHARM REV CODE 636 W HCPCS: Performed by: HOSPITALIST

## 2017-11-24 PROCEDURE — 11000001 HC ACUTE MED/SURG PRIVATE ROOM

## 2017-11-24 PROCEDURE — 97161 PT EVAL LOW COMPLEX 20 MIN: CPT

## 2017-11-24 PROCEDURE — 84100 ASSAY OF PHOSPHORUS: CPT

## 2017-11-24 PROCEDURE — 85027 COMPLETE CBC AUTOMATED: CPT

## 2017-11-24 PROCEDURE — 83735 ASSAY OF MAGNESIUM: CPT

## 2017-11-24 PROCEDURE — G8981 BODY POS CURRENT STATUS: HCPCS | Mod: CN

## 2017-11-24 PROCEDURE — 80048 BASIC METABOLIC PNL TOTAL CA: CPT

## 2017-11-24 RX ORDER — KETOROLAC TROMETHAMINE 30 MG/ML
15 INJECTION, SOLUTION INTRAMUSCULAR; INTRAVENOUS EVERY 6 HOURS PRN
Status: DISPENSED | OUTPATIENT
Start: 2017-11-24 | End: 2017-11-25

## 2017-11-24 RX ORDER — ACETAMINOPHEN 325 MG/1
650 TABLET ORAL EVERY 6 HOURS PRN
Status: DISCONTINUED | OUTPATIENT
Start: 2017-11-24 | End: 2017-12-07 | Stop reason: HOSPADM

## 2017-11-24 RX ORDER — FUROSEMIDE 10 MG/ML
40 INJECTION INTRAMUSCULAR; INTRAVENOUS ONCE
Status: COMPLETED | OUTPATIENT
Start: 2017-11-24 | End: 2017-11-24

## 2017-11-24 RX ORDER — ACETAMINOPHEN 650 MG/1
650 SUPPOSITORY RECTAL EVERY 6 HOURS PRN
Status: DISCONTINUED | OUTPATIENT
Start: 2017-11-24 | End: 2017-12-07 | Stop reason: HOSPADM

## 2017-11-24 RX ADMIN — LEVETIRACETAM 250 MG: 250 TABLET, FILM COATED ORAL at 06:11

## 2017-11-24 RX ADMIN — SODIUM CHLORIDE: 0.9 INJECTION, SOLUTION INTRAVENOUS at 01:11

## 2017-11-24 RX ADMIN — FUROSEMIDE 40 MG: 10 INJECTION, SOLUTION INTRAMUSCULAR; INTRAVENOUS at 11:11

## 2017-11-24 RX ADMIN — PIPERACILLIN AND TAZOBACTAM 4.5 G: 4; .5 INJECTION, POWDER, LYOPHILIZED, FOR SOLUTION INTRAVENOUS; PARENTERAL at 10:11

## 2017-11-24 RX ADMIN — DONEPEZIL HYDROCHLORIDE 10 MG: 5 TABLET, FILM COATED ORAL at 06:11

## 2017-11-24 RX ADMIN — PIPERACILLIN AND TAZOBACTAM 4.5 G: 4; .5 INJECTION, POWDER, LYOPHILIZED, FOR SOLUTION INTRAVENOUS; PARENTERAL at 02:11

## 2017-11-24 RX ADMIN — PIPERACILLIN AND TAZOBACTAM 4.5 G: 4; .5 INJECTION, POWDER, LYOPHILIZED, FOR SOLUTION INTRAVENOUS; PARENTERAL at 06:11

## 2017-11-24 RX ADMIN — ACETAMINOPHEN 650 MG: 650 SUPPOSITORY RECTAL at 06:11

## 2017-11-24 RX ADMIN — OXYCODONE HYDROCHLORIDE 10 MG: 5 TABLET ORAL at 03:11

## 2017-11-24 RX ADMIN — KETOROLAC TROMETHAMINE 15 MG: 30 INJECTION, SOLUTION INTRAMUSCULAR at 09:11

## 2017-11-24 RX ADMIN — ENOXAPARIN SODIUM 30 MG: 100 INJECTION SUBCUTANEOUS at 09:11

## 2017-11-24 RX ADMIN — MUPIROCIN 1 G: 20 OINTMENT TOPICAL at 09:11

## 2017-11-24 RX ADMIN — MEMANTINE HYDROCHLORIDE 10 MG: 5 TABLET ORAL at 10:11

## 2017-11-24 RX ADMIN — KETOROLAC TROMETHAMINE 15 MG: 30 INJECTION, SOLUTION INTRAMUSCULAR at 10:11

## 2017-11-24 RX ADMIN — LEVOTHYROXINE SODIUM 25 MCG: 25 TABLET ORAL at 06:11

## 2017-11-24 RX ADMIN — GABAPENTIN 100 MG: 100 CAPSULE ORAL at 06:11

## 2017-11-24 RX ADMIN — LEVETIRACETAM 250 MG: 250 TABLET, FILM COATED ORAL at 10:11

## 2017-11-24 NOTE — PLAN OF CARE
Problem: Occupational Therapy Goal  Goal: Occupational Therapy Goal  Goals to be met by: 12/24     Patient will increase functional independence with ADLs by performing:    Feeding with Set-up Assistance.  Grooming while seated with Minimal Assistance.  Sitting at edge of bed x10 minutes with Moderate Assistance.  Rolling to Bilateral with Moderate Assistance.   Supine to sit with Maximum Assistance.  Toilet transfer to bedside commode with Maximum Assistance.    Outcome: Ongoing (interventions implemented as appropriate)  OT eval performed, report to follow    Pt will benefit from SNF to facilitate return to PLOF to assist with transfers and basic ADLs

## 2017-11-24 NOTE — PLAN OF CARE
Notified MIKE Torres of pt having a temp of 102.9 AX and that pt is now easily arousable to voice, pt not following commands at this point and is not answering questions. Orders for tylenol received and to continue monitoring closely.

## 2017-11-24 NOTE — NURSING
Dr Duke on unit. Informed of pt remains very lethargic, not drinking or able to swallow PO meds currently, uop decreased last couple of hours and dark milvia.  No new orders given.

## 2017-11-24 NOTE — PLAN OF CARE
TN met with pt and wife vIis p #  [phone]    pt lives at home with wife - had [facility] until last wk   pt has :  bsc, ttb, walker, [facility] hosp bed       per notes and per wife - pt will need SNF post d/c   per Ms. Irina nicholson to consult in this order:  1  [facility], 2 [facility], 3.  [facility] 4.  [facility]       referrals sent per [facility]        11/24/17 1212   Discharge Assessment   Assessment Type Discharge Planning Assessment   Confirmed/corrected address and phone number on facesheet? Yes   Assessment information obtained from? Caregiver;Medical Record   Expected Length of Stay (days) 5   Communicated expected length of stay with patient/caregiver yes   Prior to hospitilization cognitive status: Unable to Assess   Prior to hospitalization functional status: Partially Dependent;Needs Assistance   Current cognitive status: Unable to Assess   Current Functional Status: Completely Dependent   Lives With spouse   Able to Return to Prior Arrangements no   Is patient able to care for self after discharge? Unable to determine at this time (comments)   Patient's perception of discharge disposition skilled nursing facility   Readmission Within The Last 30 Days no previous admission in last 30 days   Patient currently being followed by outpatient case management? No   Patient currently receives any other outside agency services? No   Equipment Currently Used at Home bedside commode;bath bench;walker, rolling;wheelchair;hospital bed   Do you have any problems affording any of your prescribed medications? No  ([facility] )   Is the patient taking medications as prescribed? yes   Does the patient receive services at the [facility]? No   Discharge Plan A Skilled Nursing Facility   Patient/Family In Agreement With Plan yes

## 2017-11-24 NOTE — PLAN OF CARE
Problem: Patient Care Overview  Goal: Plan of Care Review  Outcome: Ongoing (interventions implemented as appropriate)  Pt remains lethargic, only opens eyes to voice. Pt on 8 L simple face mask. Plan of care reviewed with patient and spouse.

## 2017-11-24 NOTE — PLAN OF CARE
Problem: Physical Therapy Goal  Goal: Physical Therapy Goal  Goals to be met by: 17     Patient will increase functional independence with mobility by performin. Supine <> sit with Maximum Assistance  2. Sit to stand transfer with Moderate Assistance  3. Bed to chair transfer with Moderate Assistance using Rolling Walker  4. Gait  x 5 feet with Minimal Assistance using Rolling Walker.   5. Sitting at edge of bed x 15 minutes with Stand-by Assistance  6. Lower extremity exercise program x 10 reps per handout, with assistance as needed    Outcome: Ongoing (interventions implemented as appropriate)  PT evaluation completed today and pt will benefit from continued skilled PT services to address pt's functional limitations.

## 2017-11-24 NOTE — PT/OT/SLP EVAL
Occupational Therapy  Evaluation    Sung Buckley   MRN: [id number]   Admitting Diagnosis: Closed subcapital fracture of right femur with routine healing    OT Date of Treatment: 11/24/17   OT Start Time: 0915  OT Stop Time: 0948  OT Total Time (min): 33 min    Billable Minutes:  Evaluation 15  Therapeutic Activity 15    Diagnosis: Closed subcapital fracture of right femur with routine healing   S/p hemiarthroplasty    Past Medical History:   Diagnosis Date    CTS (carpal tunnel syndrome)     Depression     GERD (gastroesophageal reflux disease)     Kidney stones     Neuropathy     Other and unspecified hyperlipidemia       Past Surgical History:   Procedure Laterality Date    CARPAL TUNNEL RELEASE           General Precautions: Standard, fall  Orthopedic Precautions: RLE posterior precautions, RLE weight bearing as tolerated  Braces:            Patient History:  Living Environment  Living Environment Comment: Lives w/wife in [facility], [place], tub/shower combo w/TTB.  Pt is dep for dressing, bathng, able to feed self and assist w/transfers and toileting.  Pt has hospital bed, WC, RW, BSC, TTB. Pt had been rec'ing home therapy and working on gait training  Equipment Currently Used at Home: bedside commode, hospital bed, bath bench, wheelchair, walker, rolling    Prior level of function:            Dominant hand:     Subjective:  Communicated with nurse prior to session.  Nonverbal throughout eval, occasional yes or no response  Chief Complaint: none stated  Patient/Family stated goals: wife would like pt to be able to assist in his care again    Pain/Comfort  Pain Rating 1:  (does not rate; however does have s/s of pain Josue Amezcua 4/10)  Location - Side 1: Right  Location - Orientation 1: generalized  Location 1: hip  Pain Addressed 1: Reposition, Distraction    Objective:  Patient found with: hip abduction pillow, SCD, oxygen (ICU monitoring)    Cognitive Exam:  Oriented to: none  Follows Commands/attention: no command  following noted  Communication: essentially nonverbal throughout  Memory:  Impaired STM and Impaired LTM  Safety awareness/insight to disability: impaired  Coping skills/emotional control:     Visual/perceptual:  NT    Physical Exam:  Postural examination/scapula alignment: Rounded shoulder, Head forward, Posterior pelvic tilt and Kyphosis  Skin integrity: Bruising of extremities, Rt hip incision  Edema: Moderate LUE    Sensation:   NT    Upper Extremity Range of Motion:  Right Upper Extremity: Deficits: No AROM on command, PROM WFL, ongoing assessment  Left Upper Extremity: Deficits: No AROM on command, PROM WFL, ongoing assessment   Cogwheel rigidity noted BUE    Upper Extremity Strength:  Right Upper Extremity: Unable to assess 2/2 decreased command following  Left Upper Extremity: Unable to assess 2/2 decreased command following   Strength: Unable to assess 2/2 decreased command following    Fine motor coordination:   NT    Gross motor coordination: impaired    Functional Mobility:  Bed Mobility:  Rolling/Turning Right: Dependent, With assist of 2  Scooting/Bridging: Dependent, With assist of 2  Supine to Sit: Dependent, With assist of 2  Sit to Supine: With assist of 2, Dependent    Transfers:       Functional Ambulation: NT    Activities of Daily Living:       LE Dressing Level of Assistance: Total assistance  Total assist all ADLs at this time    Balance:   Static Sit: 0: Needs MAXIMAL assist to maintain sitting without back support  Dynamic Sit: 0: N/A    Therapeutic Activities and Exercises:  Pt sat EOB ~8-10 minutes max-total assist, poor midline orientation. Noted to have Rt lateral and posterior lean.  Pt/wife educated on hip precautions, role of OT/POC, post acute therapy.  Nurse educated on positioning and posterior hip precautions    AM-PAC 6 CLICK ADL  How much help from another person does this patient currently need?  1 = Unable, Total/Dependent Assistance  2 = A lot, Maximum/Moderate  "Assistance  3 = A little, Minimum/Contact Guard/Supervision  4 = None, Modified [place]/Independent    Putting on and taking off regular lower body clothing? : 1  Bathing (including washing, rinsing, drying)?: 1  Toileting, which includes using toilet, bedpan, or urinal? : 1  Putting on and taking off regular upper body clothing?: 1  Taking care of personal grooming such as brushing teeth?: 1  Eating meals?: 1  Total Score: 6    AM-PAC Raw Score CMS "G-Code Modifier Level of Impairment Assistance   6 % Total / Unable   7 - 9 CM 80 - 100% Maximal Assist   10-14 CL 60 - 80% Moderate Assist   15 - 19 CK 40 - 60% Moderate Assist   20 - 22 CJ 20 - 40% Minimal Assist   23 CI 1-20% SBA / CGA   24 CH 0% Independent/ Mod I       Patient left HOB elevated with all lines intact, call button in reach, bed alarm on, nurse notified and wfie present    Assessment:  Sung Buckley is a 72 y.o. male with a medical diagnosis of Closed subcapital fracture of right femur with routine healing and presents with decreased AROM, strength, balance, endurance, impaired tone, cognition, increased pain limiting indep at this time.  Pt required increased assist PTA, however is not at baseline.  Will benefit from skilled OT services to maximize indep..    Rehab identified problem list/impairments: Rehab identified problem list/impairments: weakness, impaired functional mobilty, impaired cognition, decreased safety awareness, impaired cardiopulmonary response to activity, impaired endurance, gait instability, decreased coordination, pain, impaired self care skills, impaired balance, decreased lower extremity function, decreased upper extremity function, decreased ROM, edema    Rehab potential is fair.    Activity tolerance: Fair    Discharge recommendations: Discharge Facility/Level Of Care Needs: nursing facility, skilled     Barriers to discharge: Barriers to Discharge: Decreased caregiver support    Equipment recommendations: none "     GOALS:    Occupational Therapy Goals        Problem: Occupational Therapy Goal    Goal Priority Disciplines Outcome Interventions   Occupational Therapy Goal     OT, PT/OT Ongoing (interventions implemented as appropriate)    Description:  Goals to be met by: 12/24     Patient will increase functional independence with ADLs by performing:    Feeding with Set-up Assistance.  Grooming while seated with Minimal Assistance.  Sitting at edge of bed x10 minutes with Moderate Assistance.  Rolling to Bilateral with Moderate Assistance.   Supine to sit with Maximum Assistance.  Toilet transfer to bedside commode with Maximum Assistance.                      PLAN:  Patient to be seen 5 x/week to address the above listed problems via self-care/home management, therapeutic activities, therapeutic exercises  Plan of Care expires: 12/24/17  Plan of Care reviewed with: patient, spouse         Pilo Quiroz OT  11/24/2017

## 2017-11-24 NOTE — PT/OT/SLP PROGRESS
Speech Language Pathology  Missed Visit    Sung Buckley  MRN: [id number]    Patient not seen today secondary to pt somnolent. Pt woke to auditory cuing x1. However, pt was observed to keep eyes closed to max auditory and tactile cuing from SLP   . SLP will follow-up next date when pt is appropriate for ST services.     RADHA Dominguez., CF-SLP  Speech-Language Pathologist   11/24/2017

## 2017-11-24 NOTE — PT/OT/SLP EVAL
Physical Therapy  Evaluation    Sung Buckley   MRN: [id number]   Admitting Diagnosis: Closed subcapital fracture of right femur with routine healing    PT Received On: 11/24/17  PT Start Time: 0914     PT Stop Time: 0949    PT Total Time (min): 35 min with OT       Billable Minutes:  Evaluation 15    Diagnosis: Closed subcapital fracture of right femur with routine healing  The primary encounter diagnosis was Closed subcapital fracture of right femur, initial encounter. Diagnoses of Fall, Low oxygen saturation, and Preop examination were also pertinent to this visit.    Past Medical History:   Diagnosis Date    CTS (carpal tunnel syndrome)     Depression     GERD (gastroesophageal reflux disease)     Kidney stones     Neuropathy     Other and unspecified hyperlipidemia       Past Surgical History:   Procedure Laterality Date    CARPAL TUNNEL RELEASE         Referring physician: Dr. Hallman  Date referred to PT: [id number]    General Precautions: Standard, fall  Orthopedic Precautions: RLE weight bearing as tolerated, RLE posterior precautions   Braces: N/A       Do you have any cultural, spiritual, Christian conflicts, given your current situation?: none reported to PT    Patient History:  Living Environment Comment: Pt lives with his wife in a H with THE and tub/shower combo with TTB. Pt is dependent for dressing, bathing, is able to assist with self-feeding, and pt's wife was assisting him with transfers from hospital bed<>w/c<>[facility]. Pt has had HH PT for last 2-3 weeks and pt initiating gait training with RW with HH therapy.   Equipment Currently Used at Home: bedside commode, bath bench, wheelchair, hospital bed, walker, rolling  DME owned (not currently used): none    Previous Level of Function:  Ambulation Skills:  (had recently initiated gait training with HH PT overt last couple weeks (with RW and assist))  Transfer Skills: needs assist  ADL Skills: needs assist    Subjective:  Communicated with MITCH Hill  "prior to session.  Pt does not answer therapist's questions or follow commands consistently but opens his eyes. Pt with throat clearing throughout session, pt's wife reports he does this at home when he is anxious or moving around.  Chief Complaint: pt does not report any complaints  Patient goals: pt reports no goals, pt's wife present and has goals for pt to return to his baseline when she is able to assist him with transfers with minimal assistance.    Pain/Comfort  Pain Rating 1:  (does not rate but when asked, reports 'yes' to pain in R hip when sitting EOB)  Location - Side 1: Right  Location 1: hip  Pain Addressed 1: Reposition      Objective:   Patient found with: SCD, hip abduction pillow, oxygen, pulse ox (continuous) (ICU monitoring)     Cognitive Exam:  Oriented to: pt does not answer any questions    Follows Commands/attention: only follows commands to open his eyes  Communication: pt only says "yeah" 2-3x during session, no other communication  Safety awareness/insight to disability: impaired    Physical Exam:  Postural examination/scapula alignment: Rounded shoulder and Head forward    Skin integrity: surgical scar R hip  Edema: Moderate LUE    Sensation:   Unable to assess, pt does not answer questions or follow commands    Upper Extremity Range of Motion:  See OT note    Upper Extremity Strength:  See OT note    Lower Extremity Range of Motion:  Right Lower Extremity: Deficits: no active ROM performed today, PROM of hip flexion ~10 deg from neutral, hip abduction 15-20 deg  Left Lower Extremity: Deficits: no AROM performed today, PROM of hip lacking full motion with stiffness    Lower Extremity Strength:  Unable to assess 2/2 pt not following commands    Functional Mobility:  Bed Mobility:  Scooting/Bridging: Dependent, With assist of 2 (for drawsheet transfer to [facility])  Supine to Sit: Total Assistance, With assist of 2  Sit to Supine: Total Assistance, With assist of  2    Transfers:  Sit <> Stand " Assistance: Activity did not occur    Gait:   Gait Distance: Activity did not occur    Balance:   Static Sit: POOR+: Needs MINIMAL assist to maintain  Dynamic Sit: POOR: N/A    Therapeutic Activities and Exercises:  Pt sat EOB 8-10 minutes, with R lateral leaning requiring min A to correct back to midline then returns to leaning again. Pt able to sit briefly with SBA but continues to lean throughout sitting.    AM-PAC 6 CLICK MOBILITY  How much help from another person does this patient currently need?   1 = Unable, Total/Dependent Assistance  2 = A lot, Maximum/Moderate Assistance  3 = A little, Minimum/Contact Guard/Supervision  4 = None, Modified [place]/Independent    Turning over in bed (including adjusting bedclothes, sheets and blankets)?: 1  Sitting down on and standing up from a chair with arms (e.g., wheelchair, bedside commode, etc.): 1  Moving from lying on back to sitting on the side of the bed?: 1  Moving to and from a bed to a chair (including a wheelchair)?: 1  Need to walk in hospital room?: 1  Climbing 3-5 steps with a railing?: 1  Total Score: 6     AM-PAC Raw Score CMS G-Code Modifier Level of Impairment Assistance   6 % Total / Unable   7 - 9 CM 80 - 100% Maximal Assist   10 - 14 CL 60 - 80% Moderate Assist   15 - 19 CK 40 - 60% Moderate Assist   20 - 22 CJ 20 - 40% Minimal Assist   23 CI 1-20% SBA / CGA   24 CH 0% Independent/ Mod I     Patient left HOB elevated with all lines intact, call button in reach, bed alarm on, RN notified and pt's wife present.    Assessment:   Sung Buckley is a 72 y.o. male with a medical diagnosis of Closed subcapital fracture of right femur with routine healing and presents with pre-morbid dementia limiting pt's communication and command following but pt's wife reporting he was more communicative at baseline. Pt with R hip posterior hip precautions and requires total assist x 2 for all bed mobility at this time. Recommending SNF placement upon  d/c.    Rehab identified problem list/impairments: Rehab identified problem list/impairments: weakness, impaired endurance, impaired self care skills, impaired functional mobilty, gait instability, impaired balance, impaired cognition, decreased upper extremity function, decreased lower extremity function, pain, decreased safety awareness, decreased ROM, orthopedic precautions, edema, impaired skin    Rehab potential is fair.    Activity tolerance: Poor    Discharge recommendations: Discharge Facility/Level Of Care Needs: nursing facility, skilled     Barriers to discharge: Barriers to Discharge: Decreased caregiver support    Equipment recommendations: Equipment Needed After Discharge:  (TBD)     GOALS:    Physical Therapy Goals        Problem: Physical Therapy Goal    Goal Priority Disciplines Outcome Goal Variances Interventions   Physical Therapy Goal     PT/OT, PT Ongoing (interventions implemented as appropriate)     Description:  Goals to be met by: 17     Patient will increase functional independence with mobility by performin. Supine <> sit with Maximum Assistance  2. Sit to stand transfer with Moderate Assistance  3. Bed to chair transfer with Moderate Assistance using Rolling Walker  4. Gait  x 5 feet with Minimal Assistance using Rolling Walker.   5. Sitting at edge of bed x 15 minutes with Stand-by Assistance  6. Lower extremity exercise program x 10 reps per handout, with assistance as needed                      PLAN:    Patient to be seen daily to address the above listed problems via gait training, therapeutic activities, therapeutic exercises  Plan of Care expires: 17  Plan of Care reviewed with: patient, spouse    Functional Assessment Tool Used: AMPAC  Score: 6  Functional Limitation: Changing and maintaining body position  Changing and Maintaining Body Position Current Status (): CN  Changing and Maintaining Body Position Goal Status (): LELE Keene,  PT  11/24/2017

## 2017-11-25 PROBLEM — E83.39 HYPOPHOSPHATEMIA: Status: ACTIVE | Noted: 2017-11-25

## 2017-11-25 LAB
ANION GAP SERPL CALC-SCNC: 12 MMOL/L
BUN SERPL-MCNC: 18 MG/DL
CALCIUM SERPL-MCNC: 8.8 MG/DL
CHLORIDE SERPL-SCNC: 103 MMOL/L
CO2 SERPL-SCNC: 23 MMOL/L
CREAT SERPL-MCNC: 1.1 MG/DL
ERYTHROCYTE [DISTWIDTH] IN BLOOD BY AUTOMATED COUNT: 14.1 %
EST. GFR  (AFRICAN AMERICAN): >60 ML/MIN/1.73 M^2
EST. GFR  (NON AFRICAN AMERICAN): >60 ML/MIN/1.73 M^2
GLUCOSE SERPL-MCNC: 134 MG/DL
HCT VFR BLD AUTO: 39.3 %
HGB BLD-MCNC: 13.1 G/DL
MAGNESIUM SERPL-MCNC: 1.8 MG/DL
MCH RBC QN AUTO: 29 PG
MCHC RBC AUTO-ENTMCNC: 33.3 G/DL
MCV RBC AUTO: 87 FL
PHOSPHATE SERPL-MCNC: 1.5 MG/DL
PLATELET # BLD AUTO: 133 K/UL
PMV BLD AUTO: 9.9 FL
POTASSIUM SERPL-SCNC: 3.4 MMOL/L
RBC # BLD AUTO: 4.51 M/UL
SODIUM SERPL-SCNC: 138 MMOL/L
WBC # BLD AUTO: 9.85 K/UL

## 2017-11-25 PROCEDURE — 80048 BASIC METABOLIC PNL TOTAL CA: CPT

## 2017-11-25 PROCEDURE — 94640 AIRWAY INHALATION TREATMENT: CPT

## 2017-11-25 PROCEDURE — 11000001 HC ACUTE MED/SURG PRIVATE ROOM

## 2017-11-25 PROCEDURE — 97530 THERAPEUTIC ACTIVITIES: CPT

## 2017-11-25 PROCEDURE — 25000003 PHARM REV CODE 250

## 2017-11-25 PROCEDURE — 36415 COLL VENOUS BLD VENIPUNCTURE: CPT

## 2017-11-25 PROCEDURE — 97116 GAIT TRAINING THERAPY: CPT

## 2017-11-25 PROCEDURE — 63600175 PHARM REV CODE 636 W HCPCS: Performed by: HOSPITALIST

## 2017-11-25 PROCEDURE — 84100 ASSAY OF PHOSPHORUS: CPT

## 2017-11-25 PROCEDURE — 92610 EVALUATE SWALLOWING FUNCTION: CPT

## 2017-11-25 PROCEDURE — 25000242 PHARM REV CODE 250 ALT 637 W/ HCPCS: Performed by: HOSPITALIST

## 2017-11-25 PROCEDURE — 94799 UNLISTED PULMONARY SVC/PX: CPT

## 2017-11-25 PROCEDURE — 83735 ASSAY OF MAGNESIUM: CPT

## 2017-11-25 PROCEDURE — 63600175 PHARM REV CODE 636 W HCPCS

## 2017-11-25 PROCEDURE — 27100171 HC OXYGEN HIGH FLOW UP TO 24 HOURS

## 2017-11-25 PROCEDURE — 94761 N-INVAS EAR/PLS OXIMETRY MLT: CPT

## 2017-11-25 PROCEDURE — 85027 COMPLETE CBC AUTOMATED: CPT

## 2017-11-25 PROCEDURE — 25000003 PHARM REV CODE 250: Performed by: HOSPITALIST

## 2017-11-25 RX ORDER — POTASSIUM CHLORIDE 20 MEQ/15ML
40 SOLUTION ORAL ONCE
Status: COMPLETED | OUTPATIENT
Start: 2017-11-25 | End: 2017-11-25

## 2017-11-25 RX ORDER — FUROSEMIDE 10 MG/ML
40 INJECTION INTRAMUSCULAR; INTRAVENOUS ONCE
Status: COMPLETED | OUTPATIENT
Start: 2017-11-25 | End: 2017-11-25

## 2017-11-25 RX ADMIN — MEMANTINE HYDROCHLORIDE 10 MG: 5 TABLET ORAL at 09:11

## 2017-11-25 RX ADMIN — PIPERACILLIN AND TAZOBACTAM 4.5 G: 4; .5 INJECTION, POWDER, LYOPHILIZED, FOR SOLUTION INTRAVENOUS; PARENTERAL at 10:11

## 2017-11-25 RX ADMIN — PIPERACILLIN AND TAZOBACTAM 4.5 G: 4; .5 INJECTION, POWDER, LYOPHILIZED, FOR SOLUTION INTRAVENOUS; PARENTERAL at 02:11

## 2017-11-25 RX ADMIN — LEVOTHYROXINE SODIUM 25 MCG: 25 TABLET ORAL at 05:11

## 2017-11-25 RX ADMIN — POTASSIUM CHLORIDE 40 MEQ: 20 SOLUTION ORAL at 03:11

## 2017-11-25 RX ADMIN — MUPIROCIN 1 G: 20 OINTMENT TOPICAL at 09:11

## 2017-11-25 RX ADMIN — PIPERACILLIN AND TAZOBACTAM 4.5 G: 4; .5 INJECTION, POWDER, LYOPHILIZED, FOR SOLUTION INTRAVENOUS; PARENTERAL at 06:11

## 2017-11-25 RX ADMIN — IPRATROPIUM BROMIDE AND ALBUTEROL SULFATE 3 ML: .5; 3 SOLUTION RESPIRATORY (INHALATION) at 11:11

## 2017-11-25 RX ADMIN — POTASSIUM PHOSPHATE, MONOBASIC AND POTASSIUM PHOSPHATE, DIBASIC 30 MMOL: 224; 236 INJECTION, SOLUTION INTRAVENOUS at 05:11

## 2017-11-25 RX ADMIN — LEVETIRACETAM 250 MG: 250 TABLET, FILM COATED ORAL at 05:11

## 2017-11-25 RX ADMIN — ENOXAPARIN SODIUM 30 MG: 100 INJECTION SUBCUTANEOUS at 08:11

## 2017-11-25 RX ADMIN — DOCUSATE SODIUM 100 MG: 100 CAPSULE, LIQUID FILLED ORAL at 09:11

## 2017-11-25 RX ADMIN — MUPIROCIN 1 G: 20 OINTMENT TOPICAL at 08:11

## 2017-11-25 RX ADMIN — ACETAMINOPHEN 650 MG: 650 SUPPOSITORY RECTAL at 05:11

## 2017-11-25 RX ADMIN — LEVETIRACETAM 250 MG: 250 TABLET, FILM COATED ORAL at 09:11

## 2017-11-25 RX ADMIN — DONEPEZIL HYDROCHLORIDE 10 MG: 5 TABLET, FILM COATED ORAL at 05:11

## 2017-11-25 RX ADMIN — FUROSEMIDE 40 MG: 10 INJECTION, SOLUTION INTRAMUSCULAR; INTRAVENOUS at 03:11

## 2017-11-25 RX ADMIN — LEVETIRACETAM 250 MG: 250 TABLET, FILM COATED ORAL at 02:11

## 2017-11-25 RX ADMIN — FERROUS SULFATE TAB EC 325 MG (65 MG FE EQUIVALENT) 325 MG: 325 (65 FE) TABLET DELAYED RESPONSE at 05:11

## 2017-11-25 RX ADMIN — KETOROLAC TROMETHAMINE 15 MG: 30 INJECTION, SOLUTION INTRAMUSCULAR at 06:11

## 2017-11-25 RX ADMIN — FOLIC ACID 1 MG: 1 TABLET ORAL at 09:11

## 2017-11-25 NOTE — PLAN OF CARE
Problem: Patient Care Overview  Goal: Plan of Care Review  Outcome: Ongoing (interventions implemented as appropriate)  Pts safety maintained, wife at the bedside, bed alarm on. Seizure precautions continued. Pt able to take crushed meds in apple sauce. Incision CDI, abduction device on. Pain relieved with PRN Toradol. No SOB, N/V, distress. NSR on heart monitor.

## 2017-11-25 NOTE — PROGRESS NOTES
Pt has not yet voided since king d/c this morning. Bladder scan shows 35mL of urine in the bladder. MD notified, verbalized understanding. New orders noted. Will continue to monitor.

## 2017-11-25 NOTE — ASSESSMENT & PLAN NOTE
Dysphagia  Continue namenda and aricept. On dental soft diet with thin liquids at home. At high risk for delirium. Hold opiates for now.

## 2017-11-25 NOTE — PROGRESS NOTES
Pt's temp measured 101.5*F, HR in the 110s. While administering tylenol suppository, pt coughed up a small amount (<5mL> of bright red blood. MD called and notified. MD verbalized understanding, ordered to keep monitoring. No new orders at this time. Will continue to monitor.

## 2017-11-25 NOTE — PLAN OF CARE
Problem: Patient Care Overview  Goal: Plan of Care Review  Pt doing IS @  .75  L of 2.5  L predicted. Will cont to encourage deep breathing and coughing.Pt received oxymask at 10  lpm. SPO2   94%.  Pt in no apparent respiratory distress.  Will continue to monitor.

## 2017-11-25 NOTE — ASSESSMENT & PLAN NOTE
S/p right hemiarthroplasty  Fall  Dr. Hallman performed hemiarthroplasty 11/23/17. Turn q2. Non-opiates for pain control. Docusate BID. Lovenox for DVT ppx. PT/OT. Jenni for now.

## 2017-11-25 NOTE — PROGRESS NOTES
[facility]  Orthopedics  Progress Note    Patient Name: Sung Buckley  MRN: [id number]  Admission Date: 11/22/2017  Hospital Length of Stay: 3 days  Attending Provider: Dimitrios Duke MD  Primary Care Provider: Maren Monterroso MD  Follow-up For: Procedure(s) (LRB):  ARTHROPLASTY-HIP-BURT (Right)    Post-Operative Day: 2 Days Post-Op  Subjective:     Principal Problem:Closed subcapital fracture of right femur with routine healing    Principal Orthopedic Problem: Post op Rt Hip Burt-arthroplasty    Interval History: Confusion improved, PT at bedside    Review of patient's allergies indicates:  No Known Allergies    Current Facility-Administered Medications   Medication    acetaminophen suppository 650 mg    acetaminophen tablet 650 mg    albuterol-ipratropium 2.5mg-0.5mg/3mL nebulizer solution 3 mL    citalopram tablet 20 mg    docusate sodium capsule 100 mg    donepezil tablet 10 mg    enoxaparin injection 30 mg    ferrous sulfate EC tablet 325 mg    folic acid tablet 1 mg    gemfibrozil tablet 600 mg    ketorolac injection 15 mg    levETIRAcetam tablet 250 mg    levothyroxine tablet 25 mcg    memantine tablet 10 mg    mupirocin 2 % ointment 1 g    ondansetron disintegrating tablet 8 mg    ondansetron injection 4 mg    pantoprazole EC tablet 40 mg    piperacillin-tazobactam 4.5 g in dextrose 5 % 100 mL IVPB (ready to mix system)    potassium phosphate 30 mmol in dextrose 5 % 500 mL infusion    rosuvastatin tablet 10 mg    sodium chloride 0.9% flush 3 mL    sodium chloride 0.9% flush 5 mL     Objective:     Vital Signs (Most Recent):  Temp: 97.9 °F (36.6 °C) (11/25/17 0754)  Pulse: 102 (11/25/17 0915)  Resp: 18 (11/25/17 0905)  BP: (!) 125/58 (11/25/17 0754)  SpO2: (!) 94 % (11/25/17 0905) Vital Signs (24h Range):  Temp:  [97.9 °F (36.6 °C)-101 °F (38.3 °C)] 97.9 °F (36.6 °C)  Pulse:  [] 102  Resp:  [14-30] 18  SpO2:  [90 %-97 %] 94 %  BP: ()/(55-63) 125/58  "    Weight: 96.1 kg (211 lb 13.8 oz)  Height: 5' 11" (180.3 cm)  Body mass index is 29.55 kg/m².      Intake/Output Summary (Last 24 hours) at 11/25/17 0959  Last data filed at 11/25/17 0638   Gross per 24 hour   Intake              279 ml   Output             1905 ml   Net            -1626 ml       Ortho/SPM Exam   Incision intact, no drainage.  Mild leg edema.   Hommans neg.  NVI    Significant Labs:   BMP:   Recent Labs  Lab 11/25/17  0623   *      K 3.4*      CO2 23   BUN 18   CREATININE 1.1   CALCIUM 8.8   MG 1.8     CBC:   Recent Labs  Lab 11/24/17  0405 11/25/17  0623   WBC 12.75* 9.85   HGB 13.4* 13.1*   HCT 40.6 39.3*    133*       Significant Imaging: None    Assessment/Plan:     Active Diagnoses:    Diagnosis Date Noted POA    PRINCIPAL PROBLEM:  Closed subcapital fracture of right femur with routine healing [S72.011D] 11/22/2017 Not Applicable    Hypophosphatemia [E83.39] 11/25/2017 Yes    S/P right hip hemiarthroplasty [Z96.649] 11/23/2017 Not Applicable    Bacterial pneumonia [J15.9] 11/23/2017 Yes    Fall [W19.XXXA] 11/22/2017 Yes    Seizure disorder [G40.909] 11/22/2017 Yes    Iron deficiency anemia secondary to inadequate dietary iron intake [D50.8] 09/22/2016 Yes    Dysphagia [R13.10] 06/27/2016 Yes    Gait disorder [R26.9] 10/09/2015 Yes    Polyneuropathy [G62.9] 04/17/2015 Yes    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, F01.50, F02.80] 03/13/2015 Yes    Depression with anxiety [F41.8] 03/13/2015 Yes    Mixed hyperlipidemia [E78.2] 09/02/2014 Yes      Problems Resolved During this Admission:    Diagnosis Date Noted Date Resolved POA    Leukocytosis [D72.829] 11/22/2017 11/23/2017 Yes   Post op Dane- arthroplasty hip. Healing.    Plan- PT, OOB, Prep for SNF      Abimael Hallman MD  Orthopedics  [facility]"

## 2017-11-25 NOTE — PROGRESS NOTES
[facility] Hospital Medicine  Progress Note    Patient Name: Sung Buckley  MRN: [id number]  Patient Class: IP- Inpatient   Admission Date: 11/22/2017  Length of Stay: 3 days  Attending Physician: Dimitrios Duke MD  Primary Care Provider: Maren Monterroso MD        Subjective:     Principal Problem:Closed subcapital fracture of right femur with routine healing    HPI:  Mr. Buckley is a 71 yo WM with mixed vascular and Alzheimer's dementia, HLD, and neuropathy that presented to [facility] for evaluation of right hip pain after a fall at home. His wife reports that she was cooking breakfast when she heard him calling out for her and complaining of pain. He sleeps in a hospital bed and the rails were up at the time. She found him on the floor and he says that he was trying to get up to go to the bathroom. She says that he normally calls for her assistance, but he did not this time. He has a walker and wheelchair that he uses to get around the house with and the wheelchair was beside his bed, but he was not using it at this time. He denies any loss of consciousness, light headedness, palpitations or chest pain prior to the fall. He complains of pain in the right hip that is constant, sharp, and 8/10 in intensity.     Hospital Course:  No notes on file    Interval History: Opens eyes and moves around a little more. Wakes up for very brief moments.     Review of Systems   Unable to perform ROS: Mental status change     Objective:     Vital Signs (Most Recent):  Temp: (!) 100.6 °F (38.1 °C) (11/24/17 2007)  Pulse: 98 (11/24/17 2007)  Resp: 20 (11/24/17 2007)  BP: 120/62 (11/24/17 2007)  SpO2: (!) 92 % (11/24/17 1952) Vital Signs (24h Range):  Temp:  [97.5 °F (36.4 °C)-101 °F (38.3 °C)] 100.6 °F (38.1 °C)  Pulse:  [] 98  Resp:  [14-33] 20  SpO2:  [88 %-100 %] 92 %  BP: ()/(51-63) 120/62     Weight: 96.1 kg (211 lb 13.8 oz)  Body mass index is 29.55 kg/m².    Intake/Output Summary (Last 24 hours)  at 11/24/17 2237  Last data filed at 11/24/17 2007   Gross per 24 hour   Intake          1933.75 ml   Output             2005 ml   Net           -71.25 ml      Physical Exam   Constitutional: He appears well-developed and well-nourished. He appears lethargic. No distress. Nasal cannula in place.   Cardiovascular: Normal rate and regular rhythm.    No murmur heard.  Pulmonary/Chest: Effort normal. No respiratory distress. He has rhonchi.   Abdominal: Soft. Bowel sounds are normal. He exhibits no distension. There is no tenderness.   Musculoskeletal: He exhibits no edema or tenderness.   Neurological: He appears lethargic.   Skin: Skin is warm and dry.   Nursing note and vitals reviewed.      Significant Labs:   CBC:   Recent Labs  Lab 11/23/17 0443 11/24/17  0405   WBC 10.84 12.75*   HGB 16.2 13.4*   HCT 49.2 40.6    155     CMP:   Recent Labs  Lab 11/23/17 0443 11/24/17  0405    137   K 4.2 4.0    105   CO2 26 24   * 137*   BUN 16 16   CREATININE 1.3 1.2   CALCIUM 9.4 8.3*   ANIONGAP 10 8   EGFRNONAA 54* 60     Magnesium:   Recent Labs  Lab 11/23/17 0443 11/24/17  0405   MG 2.1 1.7       Significant Imaging: I have reviewed all pertinent imaging results/findings within the past 24 hours.    Assessment/Plan:      * Closed subcapital fracture of right femur with routine healing    S/p right hemiarthroplasty  Fall  Dr. Hallman performed hemiarthroplasty 11/23/17. Turn q2. Non-opiates for pain control. Docusate BID. Lovenox for DVT ppx. PT/OT. Jenni for now.         Hypophosphatemia    Replace          Bacterial pneumonia    Possible LLL pneumonia. Blood cultures are NGTD. Continue zosyn to cover possible aspiration. Wean oxygen as able.         Seizure disorder    Continue keppra at 250 mg TID        Iron deficiency anemia secondary to inadequate dietary iron intake    Ferritin was 16 in 2016. Continue iron supplement.           Polyneuropathy    Gait disorder  Continue  folic acid and B12.  PT/OT post-operatively. Hold gabapentin while still drowsy.          Depression with anxiety    Continue citalopram.           Mixed Alzheimer's and vascular dementia    Dysphagia  Continue namenda and aricept. On dental soft diet with thin liquids at home. At high risk for delirium. Hold opiates for now.           Mixed hyperlipidemia    Continue rosuvastatin and gemfibrozil.             VTE Risk Mitigation         Ordered     enoxaparin injection 30 mg  Daily     Route:  Subcutaneous        11/23/17 1242     Place sequential compression device  Until discontinued      11/23/17 1242     High Risk of VTE  Once      11/23/17 1242              Dimitrios Duke MD  Department of Hospital Medicine   [facility]

## 2017-11-25 NOTE — PT/OT/SLP PROGRESS
Physical Therapy  Treatment    Sung Buckley   MRN: [id number]   Admitting Diagnosis: Closed subcapital fracture of right femur with routine healing    PT Received On: 11/25/17  PT Start Time: 0954     PT Stop Time: 1026    PT Total Time (min): 32 min       Billable Minutes:  Gait [id number] and Therapeutic Activity 22    Treatment Type: Treatment  PT/PTA: PT     PTA Visit Number: 0       General Precautions: Standard, fall  Orthopedic Precautions: RLE posterior precautions, RLE weight bearing as tolerated   Braces: N/A    Do you have any cultural, spiritual, Jehovah's witness conflicts, given your current situation?: none reported to PT    Subjective:  Communicated with MITCH Amezquita prior to session.  Pt agreeable to PT treatment session.   Pt's wife reports pt is much more alert today.     Pain/Comfort  Pain Rating 1:  (pt reported pain but did not rate)  Location - Side 1: Right  Location - Orientation 1: generalized  Location 1: leg  Pain Addressed 1: Reposition, Distraction  Pain Rating Post-Intervention 1:  (did not rate pain)    Objective:   Patient found with: SCD, oxygen, bed alarm    Functional Mobility:  Bed Mobility:   Scooting/Bridging: Total Assistance, With assist of 2  Supine to Sit: Maximum Assistance, With assist of 2  Sit to Supine: Maximum Assistance, With assist of  2    Transfers:  Sit <> Stand Assistance: Moderate Assistance (with assist of 2)  Sit <> Stand Assistive Device: Rolling Walker  Bed <> Chair Assistance: Activity did not occur  Toilet Transfer Assistance: Activity Did not occur  Car Transfer Assistance: Activity did not occur    Gait:   Gait Distance: Side steps X4  Assistance 1: Moderate assistance  Gait Assistive Device: Rolling walker    Stairs:  Activity did not occur.     Balance:   Static Sit: FAIR-: Maintains without assist but inconsistent   Dynamic Sit: FAIR: Cannot move trunk without losing balance  Static Stand: POOR: Needs MODERATE assist to maintain  Dynamic stand: POOR: N/A  "    Therapeutic Activities and Exercises:  -bed mobility as listed above.  -sit <>stand X2 trials throughout treatment session with RW and Mod A X2. Requiring continuous VC to maintain upright posture and prevent hip flexion.   -attempts at side stepping, unable to clear feet from floor. Pt was able to "heel/toe" slide feet small distance with verbal and tactile cueing.   -sat EOB for 5 min with CGA/SBA, occassionally Min A to prevent LOB.      AM-PAC 6 CLICK MOBILITY  How much help from another person does this patient currently need?   1 = Unable, Total/Dependent Assistance  2 = A lot, Maximum/Moderate Assistance  3 = A little, Minimum/Contact Guard/Supervision  4 = None, Modified [place]/Independent    Turning over in bed (including adjusting bedclothes, sheets and blankets)?: 2  Sitting down on and standing up from a chair with arms (e.g., wheelchair, bedside commode, etc.): 2  Moving from lying on back to sitting on the side of the bed?: 2  Moving to and from a bed to a chair (including a wheelchair)?: 2  Need to walk in hospital room?: 1  Climbing 3-5 steps with a railing?: 1  Total Score: 10    AM-PAC Raw Score CMS G-Code Modifier Level of Impairment Assistance   6 % Total / Unable   7 - 9 CM 80 - 100% Maximal Assist   10 - 14 CL 60 - 80% Moderate Assist   15 - 19 CK 40 - 60% Moderate Assist   20 - 22 CJ 20 - 40% Minimal Assist   23 CI 1-20% SBA / CGA   24 CH 0% Independent/ Mod I     Patient left HOB elevated with all lines intact, call button in reach, bed alarm on and wife present.    Assessment:  Sung Buckley is a 72 y.o. male with a medical diagnosis of Closed subcapital fracture of right femur with routine healing and presents with impairments listed below. Pt demonstrates improvements since last treatment session. Today pt was able to sit EOB with CGA/SBA, stand X2 and attempted to take side steps. Pt requires continuous VC to maintain an upright posture and posterior hip precautions. Pt " would benefit from continued PT services to improve current impairments and return to a more independent functional mobility level.    Rehab identified problem list/impairments: Rehab identified problem list/impairments: weakness, impaired endurance, impaired self care skills, impaired balance, gait instability, impaired functional mobilty, impaired cognition, decreased coordination, decreased upper extremity function, decreased lower extremity function, decreased ROM, orthopedic precautions, pain, decreased safety awareness, impaired coordination    Rehab potential is good.    Activity tolerance: Fair    Discharge recommendations: Discharge Facility/Level Of Care Needs: nursing facility, skilled     Barriers to discharge: Barriers to Discharge: Decreased caregiver support    Equipment recommendations: Equipment Needed After Discharge: none     GOALS:    Physical Therapy Goals        Problem: Physical Therapy Goal    Goal Priority Disciplines Outcome Goal Variances Interventions   Physical Therapy Goal     PT/OT, PT Ongoing (interventions implemented as appropriate)     Description:  Goals to be met by: 17     Patient will increase functional independence with mobility by performin. Supine <> sit with Maximum Assistance  2. Sit to stand transfer with Moderate Assistance  3. Bed to chair transfer with Moderate Assistance using Rolling Walker  4. Gait  x 5 feet with Minimal Assistance using Rolling Walker.   5. Sitting at edge of bed x 15 minutes with Stand-by Assistance  6. Lower extremity exercise program x 10 reps per handout, with assistance as needed                      PLAN:    Patient to be seen daily  to address the above listed problems via gait training, therapeutic activities, therapeutic exercises  Plan of Care expires: 17  Plan of Care reviewed with: patient, spouse         Prince Domínguez, PT  2017

## 2017-11-25 NOTE — PT/OT/SLP EVAL
Speech Language Pathology  Evaluation    Sung Buckley   MRN: [id number]   Admitting Diagnosis: Closed subcapital fracture of right femur with routine healing    Diet recommendations: Solid Diet Level: NPO  Liquid Diet Level: Full liquids Feed only when awake/alert, HOB to 90 degrees, Small bites/sips, Alternating bites/sips, 1 bite/sip at a time, Remain upright 30 minutes post meal, Meds crushed in puree, Eliminate distractions, Avoid talking while eating and Assistance with meals    SLP Treatment Date: 11/25/17  Speech Start Time: 1130     Speech Stop Time: 1150     Speech Total (min): 20 min       TREATMENT BILLABLE MINUTES:  Eval Swallow and Oral Function 20 minutes    Diagnosis: Closed subcapital fracture of right femur with routine healing  Mr. Buckley is a 73 yo WM with mixed vascular and Alzheimer's dementia, HLD, and neuropathy that presented to [facility] for evaluation of right hip pain after a fall at home. His wife reports that she was cooking breakfast when she heard him calling out for her and complaining of pain. He sleeps in a hospital bed and the rails were up at the time. She found him on the floor and he says that he was trying to get up to go to the bathroom. She says that he normally calls for her assistance, but he did not this time. He has a walker and wheelchair that he uses to get around the house with and the wheelchair was beside his bed, but he was not using it at this time. He denies any loss of consciousness, light headedness, palpitations or chest pain prior to the fall. He complains of pain in the right hip that is constant, sharp, and 8/10 in intensity.     Past Medical History:   Diagnosis Date    CTS (carpal tunnel syndrome)     Depression     GERD (gastroesophageal reflux disease)     Kidney stones     Neuropathy     Other and unspecified hyperlipidemia      Past Surgical History:   Procedure Laterality Date    CARPAL TUNNEL RELEASE         Has the patient been evaluated by SLP for  "swallowing? : Yes  Keep patient NPO?: No   General Precautions: Standard, aspiration, fall    Current Respiratory Status: other (comment) (oxymask)     Social Hx: Patient lives with spouse at home.    Prior diet: dental soft/thin liquids.    Subjective:  Pt's family reports pt has h/o dysphagia. MBSS completed 5/16/2016 with following results: "Pt demonstrated slight pharyngeal dysphagia to solids as evidenced by delayed pharyngeal swallow, decreased tongue base retraction, reduced laryngeal elevation and decreased pharyngeal sensation was noted with residuals post swallow onset. NO penetration or aspiration was noted during this study." Pt placed on dental soft/finely chopped with thin liquids. Suspect pt's swallowing deficits have progressed in the past 1.5 years.    Pain/Comfort  Pain Rating 1: 0/10    Objective:   Patient found with: bed alarm, oxygen    Oral Musculature Evaluation  Oral Musculature: general weakness, unable to assess due to poor participation/comprehension  Oral Labial Strength and Mobility: WFL  Lingual Strength and Mobility: WFL     Bedside Swallow Eval:  Consistencies Assessed: Thin liquids (x2 single straw sips of thin liquids), Nectar thick liquids (x3 single straw sips of thin liquids) and Puree (x2 tsp bites of puree)  Oral Phase: slow oral transit time with purees  Pharyngeal Phase: dry cough and throat clearing noted inconsistently throughout BSE    Additional Treatment:  Provided skilled education to pt's family re: swallow study results, diet recs, MBSS recs, and swallow precautions. Discussed findings with RN and Dr. Duke.     Assessment:  Sung Buckley is a 72 y.o. male with a medical diagnosis of Closed subcapital fracture of right femur with routine healing and presents with intermittent overt s/s of aspiration throughout BSE. Recommend full liquids at this time. ST will f/u to ensure safety and to adjust diet as tolerated. Recommend MBSS be completed in order to objectively rule " out aspiration and to determine safest PO consistencies.    Do you have any cultural, spiritual, Bahai conflicts, given your current situation?: n/a     Discharge recommendations: Discharge Facility/Level Of Care Needs: nursing facility, skilled     Goals:    SLP Goals        Problem: SLP Goal    Goal Priority Disciplines Outcome   SLP Goal     SLP Ongoing (interventions implemented as appropriate)   Description:  SLP Short Term Goals:  1. Patient will successfully participate in ongoing clinical swallow evaluation and tolerate po trials with no overt s/s of aspiration. --ongoing  2. Pt will safely consume full liquid diet w/ thin liquids w/ no overt s/s of aspiration at supervision level.  3. Pt will successfully participate in Modified Barium Swallow study to radiographically assess swallow function, rule out aspiration and determine safest/least restrictive diet.                     Plan:   Patient to be seen Therapy Frequency: 3 x/week   Plan of Care expires: 12/24/17  Plan of Care reviewed with: patient, spouse, family, other (see comments) (MITCH Amezquita)  SLP Follow-up?: Yes  SLP - Next Visit Date: 11/26/17           Jessica Franco CCC-SLP  11/25/2017

## 2017-11-25 NOTE — ASSESSMENT & PLAN NOTE
Gait disorder  Continue  folic acid and B12. PT/OT post-operatively. Hold gabapentin while still drowsy.

## 2017-11-25 NOTE — SUBJECTIVE & OBJECTIVE
Interval History: Opens eyes and moves around a little more. Wakes up for very brief moments.     Review of Systems   Unable to perform ROS: Mental status change     Objective:     Vital Signs (Most Recent):  Temp: (!) 100.6 °F (38.1 °C) (11/24/17 2007)  Pulse: 98 (11/24/17 2007)  Resp: 20 (11/24/17 2007)  BP: 120/62 (11/24/17 2007)  SpO2: (!) 92 % (11/24/17 1952) Vital Signs (24h Range):  Temp:  [97.5 °F (36.4 °C)-101 °F (38.3 °C)] 100.6 °F (38.1 °C)  Pulse:  [] 98  Resp:  [14-33] 20  SpO2:  [88 %-100 %] 92 %  BP: ()/(51-63) 120/62     Weight: 96.1 kg (211 lb 13.8 oz)  Body mass index is 29.55 kg/m².    Intake/Output Summary (Last 24 hours) at 11/24/17 2237  Last data filed at 11/24/17 2007   Gross per 24 hour   Intake          1933.75 ml   Output             2005 ml   Net           -71.25 ml      Physical Exam   Constitutional: He appears well-developed and well-nourished. He appears lethargic. No distress. Nasal cannula in place.   Cardiovascular: Normal rate and regular rhythm.    No murmur heard.  Pulmonary/Chest: Effort normal. No respiratory distress. He has rhonchi.   Abdominal: Soft. Bowel sounds are normal. He exhibits no distension. There is no tenderness.   Musculoskeletal: He exhibits no edema or tenderness.   Neurological: He appears lethargic.   Skin: Skin is warm and dry.   Nursing note and vitals reviewed.      Significant Labs:   CBC:   Recent Labs  Lab 11/23/17 0443 11/24/17  0405   WBC 10.84 12.75*   HGB 16.2 13.4*   HCT 49.2 40.6    155     CMP:   Recent Labs  Lab 11/23/17 0443 11/24/17  0405    137   K 4.2 4.0    105   CO2 26 24   * 137*   BUN 16 16   CREATININE 1.3 1.2   CALCIUM 9.4 8.3*   ANIONGAP 10 8   EGFRNONAA 54* 60     Magnesium:   Recent Labs  Lab 11/23/17  0443 11/24/17  0405   MG 2.1 1.7       Significant Imaging: I have reviewed all pertinent imaging results/findings within the past 24 hours.

## 2017-11-25 NOTE — PLAN OF CARE
Problem: SLP Goal  Goal: SLP Goal  SLP Short Term Goals:  1. Patient will successfully participate in ongoing clinical swallow evaluation and tolerate po trials with no overt s/s of aspiration. --ongoing  2. Pt will safely consume full liquid diet w/ thin liquids w/ no overt s/s of aspiration at supervision level.  3. Pt will successfully participate in Modified Barium Swallow study to radiographically assess swallow function, rule out aspiration and determine safest/least restrictive diet.  Outcome: Ongoing (interventions implemented as appropriate)  11/25/2017: Swallow study completed this AM. Pt w/ h/o dysphagia 2/2 progressing dementia. Pt presenting with dry cough/throat clearing intermittently during PO trials. No wet/gurgly vocal quality noted after and PO trials of thin liquids, nectar thick liquids, or puree. Pt on oxymask--regular solids deferred at this time. RECS: Full liquid diet w/ universal swallow precautions and 1:1 assist. ST will f/u to ensure safety with diet advancement. Recommend MBSS in order to objectively rule out aspiration and to determine safest PO consistencies.  RADHA Adhikari. CCC-SLP  Speech-Language Pathologist

## 2017-11-26 PROBLEM — E87.6 HYPOKALEMIA: Status: ACTIVE | Noted: 2017-11-26

## 2017-11-26 PROBLEM — J69.0 ASPIRATION PNEUMONIA: Status: ACTIVE | Noted: 2017-11-23

## 2017-11-26 PROBLEM — J96.01 ACUTE RESPIRATORY FAILURE WITH HYPOXIA: Status: ACTIVE | Noted: 2017-11-26

## 2017-11-26 LAB
AMORPH CRY URNS QL MICRO: ABNORMAL
ANION GAP SERPL CALC-SCNC: 12 MMOL/L
BACTERIA #/AREA URNS HPF: ABNORMAL /HPF
BILIRUB UR QL STRIP: NEGATIVE
BNP SERPL-MCNC: 31 PG/ML
BUN SERPL-MCNC: 17 MG/DL
CALCIUM SERPL-MCNC: 8.7 MG/DL
CHLORIDE SERPL-SCNC: 100 MMOL/L
CLARITY UR: CLEAR
CO2 SERPL-SCNC: 26 MMOL/L
COLOR UR: YELLOW
CREAT SERPL-MCNC: 1 MG/DL
EST. GFR  (AFRICAN AMERICAN): >60 ML/MIN/1.73 M^2
EST. GFR  (NON AFRICAN AMERICAN): >60 ML/MIN/1.73 M^2
GLUCOSE SERPL-MCNC: 138 MG/DL
GLUCOSE UR QL STRIP: NEGATIVE
HGB UR QL STRIP: ABNORMAL
HYALINE CASTS #/AREA URNS LPF: 0 /LPF
KETONES UR QL STRIP: NEGATIVE
LEUKOCYTE ESTERASE UR QL STRIP: NEGATIVE
MAGNESIUM SERPL-MCNC: 1.9 MG/DL
MICROSCOPIC COMMENT: ABNORMAL
NITRITE UR QL STRIP: NEGATIVE
PH UR STRIP: 5 [PH] (ref 5–8)
PHOSPHATE SERPL-MCNC: 2.2 MG/DL
POCT GLUCOSE: 145 MG/DL (ref 70–110)
POTASSIUM SERPL-SCNC: 3.3 MMOL/L
PROT UR QL STRIP: ABNORMAL
RBC #/AREA URNS HPF: 12 /HPF (ref 0–4)
SODIUM SERPL-SCNC: 138 MMOL/L
SP GR UR STRIP: 1.02 (ref 1–1.03)
SQUAMOUS #/AREA URNS HPF: 4 /HPF
URN SPEC COLLECT METH UR: ABNORMAL
UROBILINOGEN UR STRIP-ACNC: NEGATIVE EU/DL
WBC #/AREA URNS HPF: 0 /HPF (ref 0–5)

## 2017-11-26 PROCEDURE — 84100 ASSAY OF PHOSPHORUS: CPT

## 2017-11-26 PROCEDURE — 94761 N-INVAS EAR/PLS OXIMETRY MLT: CPT

## 2017-11-26 PROCEDURE — 25000003 PHARM REV CODE 250: Performed by: HOSPITALIST

## 2017-11-26 PROCEDURE — 97530 THERAPEUTIC ACTIVITIES: CPT

## 2017-11-26 PROCEDURE — 80048 BASIC METABOLIC PNL TOTAL CA: CPT

## 2017-11-26 PROCEDURE — 99900035 HC TECH TIME PER 15 MIN (STAT)

## 2017-11-26 PROCEDURE — 25500020 PHARM REV CODE 255: Performed by: HOSPITALIST

## 2017-11-26 PROCEDURE — 87086 URINE CULTURE/COLONY COUNT: CPT

## 2017-11-26 PROCEDURE — 27100171 HC OXYGEN HIGH FLOW UP TO 24 HOURS

## 2017-11-26 PROCEDURE — 83735 ASSAY OF MAGNESIUM: CPT

## 2017-11-26 PROCEDURE — 81000 URINALYSIS NONAUTO W/SCOPE: CPT

## 2017-11-26 PROCEDURE — 63600175 PHARM REV CODE 636 W HCPCS

## 2017-11-26 PROCEDURE — 36415 COLL VENOUS BLD VENIPUNCTURE: CPT

## 2017-11-26 PROCEDURE — 63600175 PHARM REV CODE 636 W HCPCS: Performed by: HOSPITALIST

## 2017-11-26 PROCEDURE — 11000001 HC ACUTE MED/SURG PRIVATE ROOM

## 2017-11-26 PROCEDURE — 92526 ORAL FUNCTION THERAPY: CPT

## 2017-11-26 PROCEDURE — 97535 SELF CARE MNGMENT TRAINING: CPT

## 2017-11-26 PROCEDURE — 94799 UNLISTED PULMONARY SVC/PX: CPT

## 2017-11-26 PROCEDURE — 87040 BLOOD CULTURE FOR BACTERIA: CPT

## 2017-11-26 PROCEDURE — 83880 ASSAY OF NATRIURETIC PEPTIDE: CPT

## 2017-11-26 RX ORDER — POLYMYXIN B SULFATE AND TRIMETHOPRIM 1; 10000 MG/ML; [USP'U]/ML
1 SOLUTION OPHTHALMIC EVERY 6 HOURS
Status: DISCONTINUED | OUTPATIENT
Start: 2017-11-26 | End: 2017-11-26

## 2017-11-26 RX ORDER — NEOMYCIN SULFATE, POLYMYXIN B SULFATE AND GRAMICIDIN 1.75; 10000; .025 MG/ML; [USP'U]/ML; MG/ML
1 SOLUTION/ DROPS OPHTHALMIC 4 TIMES DAILY
Status: DISCONTINUED | OUTPATIENT
Start: 2017-11-27 | End: 2017-12-01

## 2017-11-26 RX ORDER — SODIUM,POTASSIUM PHOSPHATES 280-250MG
2 POWDER IN PACKET (EA) ORAL
Status: DISCONTINUED | OUTPATIENT
Start: 2017-11-26 | End: 2017-12-07 | Stop reason: HOSPADM

## 2017-11-26 RX ADMIN — GEMFIBROZIL 600 MG: 600 TABLET ORAL at 09:11

## 2017-11-26 RX ADMIN — FERROUS SULFATE TAB EC 325 MG (65 MG FE EQUIVALENT) 325 MG: 325 (65 FE) TABLET DELAYED RESPONSE at 05:11

## 2017-11-26 RX ADMIN — VANCOMYCIN HYDROCHLORIDE 1000 MG: 1 INJECTION, POWDER, LYOPHILIZED, FOR SOLUTION INTRAVENOUS at 09:11

## 2017-11-26 RX ADMIN — LEVETIRACETAM 250 MG: 250 TABLET, FILM COATED ORAL at 02:11

## 2017-11-26 RX ADMIN — MUPIROCIN 1 G: 20 OINTMENT TOPICAL at 09:11

## 2017-11-26 RX ADMIN — PANTOPRAZOLE SODIUM 40 MG: 40 TABLET, DELAYED RELEASE ORAL at 09:11

## 2017-11-26 RX ADMIN — LEVOTHYROXINE SODIUM 25 MCG: 25 TABLET ORAL at 06:11

## 2017-11-26 RX ADMIN — PIPERACILLIN AND TAZOBACTAM 4.5 G: 4; .5 INJECTION, POWDER, LYOPHILIZED, FOR SOLUTION INTRAVENOUS; PARENTERAL at 03:11

## 2017-11-26 RX ADMIN — DOCUSATE SODIUM 100 MG: 100 CAPSULE, LIQUID FILLED ORAL at 09:11

## 2017-11-26 RX ADMIN — PIPERACILLIN AND TAZOBACTAM 4.5 G: 4; .5 INJECTION, POWDER, LYOPHILIZED, FOR SOLUTION INTRAVENOUS; PARENTERAL at 06:11

## 2017-11-26 RX ADMIN — ENOXAPARIN SODIUM 30 MG: 100 INJECTION SUBCUTANEOUS at 09:11

## 2017-11-26 RX ADMIN — IOHEXOL 100 ML: 350 INJECTION, SOLUTION INTRAVENOUS at 10:11

## 2017-11-26 RX ADMIN — ACETAMINOPHEN 650 MG: 325 TABLET ORAL at 12:11

## 2017-11-26 RX ADMIN — LEVETIRACETAM 250 MG: 250 TABLET, FILM COATED ORAL at 09:11

## 2017-11-26 RX ADMIN — NEOMYCIN SULFATE, POLYMYXIN B SULFATE AND GRAMICIDIN 1 DROP: 1.75; 10000; .025 SOLUTION/ DROPS OPHTHALMIC at 11:11

## 2017-11-26 RX ADMIN — FERROUS SULFATE TAB EC 325 MG (65 MG FE EQUIVALENT) 325 MG: 325 (65 FE) TABLET DELAYED RESPONSE at 09:11

## 2017-11-26 RX ADMIN — FOLIC ACID 1 MG: 1 TABLET ORAL at 09:11

## 2017-11-26 RX ADMIN — PIPERACILLIN AND TAZOBACTAM 4.5 G: 4; .5 INJECTION, POWDER, LYOPHILIZED, FOR SOLUTION INTRAVENOUS; PARENTERAL at 11:11

## 2017-11-26 RX ADMIN — LEVETIRACETAM 250 MG: 250 TABLET, FILM COATED ORAL at 06:11

## 2017-11-26 RX ADMIN — DONEPEZIL HYDROCHLORIDE 10 MG: 5 TABLET, FILM COATED ORAL at 06:11

## 2017-11-26 RX ADMIN — MEMANTINE HYDROCHLORIDE 10 MG: 5 TABLET ORAL at 09:11

## 2017-11-26 RX ADMIN — POTASSIUM & SODIUM PHOSPHATES POWDER PACK 280-160-250 MG 2 PACKET: 280-160-250 PACK at 05:11

## 2017-11-26 RX ADMIN — FERROUS SULFATE TAB EC 325 MG (65 MG FE EQUIVALENT) 325 MG: 325 (65 FE) TABLET DELAYED RESPONSE at 11:11

## 2017-11-26 RX ADMIN — POTASSIUM & SODIUM PHOSPHATES POWDER PACK 280-160-250 MG 2 PACKET: 280-160-250 PACK at 09:11

## 2017-11-26 NOTE — ASSESSMENT & PLAN NOTE
Possible LLL pneumonia. Blood cultures are NGTD. Continue zosyn to cover possible aspiration (D# 3). Wean oxygen as able.

## 2017-11-26 NOTE — PLAN OF CARE
Problem: Patient Care Overview  Goal: Plan of Care Review  Outcome: Ongoing (interventions implemented as appropriate)  Pts safety maintained, wife at the bedside, bed alarm on. Seizure precautions continued. Incision CDI. Spiking 101.5 fever, collected urinalysis and cultures. Distating to 70% when taking Oxymask off.  No complains of pain, N/V. Confused through the night, pulling on IV, taking clothes off. Sinus Tach on heart monitor.

## 2017-11-26 NOTE — PROGRESS NOTES
[facility]  Orthopedics  Progress Note    Patient Name: Sung Buckley  MRN: [id number]  Admission Date: 11/22/2017  Hospital Length of Stay: 4 days  Attending Provider: Dimitrios Duke MD  Primary Care Provider: Maren Monterroso MD  Follow-up For: Procedure(s) (LRB):  ARTHROPLASTY-HIP-BURT (Right)    Post-Operative Day: 3 Days Post-Op  Subjective:     Principal Problem:Closed subcapital fracture of right femur with routine healing    Principal Orthopedic Problem: Post op Burt hip    Interval History: Took 1 step with PT today.  Min pain.  Appears anxious    Review of patient's allergies indicates:  No Known Allergies    Current Facility-Administered Medications   Medication    acetaminophen suppository 650 mg    acetaminophen tablet 650 mg    albuterol-ipratropium 2.5mg-0.5mg/3mL nebulizer solution 3 mL    citalopram tablet 20 mg    docusate sodium capsule 100 mg    donepezil tablet 10 mg    enoxaparin injection 30 mg    ferrous sulfate EC tablet 325 mg    folic acid tablet 1 mg    gemfibrozil tablet 600 mg    levETIRAcetam tablet 250 mg    levothyroxine tablet 25 mcg    memantine tablet 10 mg    mupirocin 2 % ointment 1 g    ondansetron disintegrating tablet 8 mg    ondansetron injection 4 mg    pantoprazole EC tablet 40 mg    piperacillin-tazobactam 4.5 g in dextrose 5 % 100 mL IVPB (ready to mix system)    rosuvastatin tablet 10 mg    sodium chloride 0.9% flush 3 mL    sodium chloride 0.9% flush 5 mL    vancomycin 1 g in dextrose 5 % 250 mL IVPB (ready to mix system)     Objective:     Vital Signs (Most Recent):  Temp: 98.8 °F (37.1 °C) (11/26/17 0832)  Pulse: 92 (11/26/17 0832)  Resp: 20 (11/26/17 0832)  BP: (!) 141/70 (11/26/17 0832)  SpO2: 95 % (11/26/17 0830) Vital Signs (24h Range):  Temp:  [97.6 °F (36.4 °C)-101.5 °F (38.6 °C)] 98.8 °F (37.1 °C)  Pulse:  [] 92  Resp:  [18-28] 20  SpO2:  [92 %-95 %] 95 %  BP: (129-150)/(67-74) 141/70     Weight: 96.1 kg (211  "lb 13.8 oz)  Height: 5' 11" (180.3 cm)  Body mass index is 29.55 kg/m².      Intake/Output Summary (Last 24 hours) at 11/26/17 1006  Last data filed at 11/25/17 2305   Gross per 24 hour   Intake                0 ml   Output              687 ml   Net             -687 ml       Ortho/SPM Exam   Incision intact, no drainage  Homans neg.  Min pain with ROM hip    Significant Labs:   ABGs: No results for input(s): PH, PCO2, HCO3, POCSATURATED, BE in the last 48 hours.  BMP:   Recent Labs  Lab 11/26/17  0624   *      K 3.3*      CO2 26   BUN 17   CREATININE 1.0   CALCIUM 8.7   MG 1.9     CBC:   Recent Labs  Lab 11/25/17  0623   WBC 9.85   HGB 13.1*   HCT 39.3*   *     All pertinent labs within the past 24 hours have been reviewed.    Significant Imaging: X-Ray: I have reviewed all pertinent results/findings and my personal findings are:  CXR per report    Assessment/Plan:     Active Diagnoses:    Diagnosis Date Noted POA    PRINCIPAL PROBLEM:  Closed subcapital fracture of right femur with routine healing [S72.011D] 11/22/2017 Not Applicable    Hypophosphatemia [E83.39] 11/25/2017 Yes    S/P right hip hemiarthroplasty [Z96.649] 11/23/2017 Not Applicable    Bacterial pneumonia [J15.9] 11/23/2017 Yes    Fall [W19.XXXA] 11/22/2017 Yes    Seizure disorder [G40.909] 11/22/2017 Yes    Iron deficiency anemia secondary to inadequate dietary iron intake [D50.8] 09/22/2016 Yes    Dysphagia [R13.10] 06/27/2016 Yes    Gait disorder [R26.9] 10/09/2015 Yes    Polyneuropathy [G62.9] 04/17/2015 Yes    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, F01.50, F02.80] 03/13/2015 Yes    Depression with anxiety [F41.8] 03/13/2015 Yes    Mixed hyperlipidemia [E78.2] 09/02/2014 Yes      Problems Resolved During this Admission:    Diagnosis Date Noted Date Resolved POA    Leukocytosis [D72.829] 11/22/2017 11/23/2017 Yes     Plan: continue to mobilize.  OOB in chair with meals.    Abimael Hallman, " MD  Orthopedics  [facility]

## 2017-11-26 NOTE — SUBJECTIVE & OBJECTIVE
Interval History: More alert today. Tolerated clear liquids.     Review of Systems   Unable to perform ROS: Mental status change     Objective:     Vital Signs (Most Recent):  Temp: (!) 101.1 °F (38.4 °C) (11/25/17 1727)  Pulse: (!) 114 (11/25/17 1727)  Resp: (!) 21 (11/25/17 1615)  BP: (!) 150/71 (11/25/17 1615)  SpO2: (!) 93 % (11/25/17 1659) Vital Signs (24h Range):  Temp:  [97.9 °F (36.6 °C)-101.1 °F (38.4 °C)] 101.1 °F (38.4 °C)  Pulse:  [] 114  Resp:  [18-21] 21  SpO2:  [90 %-94 %] 93 %  BP: (113-150)/(58-74) 150/71     Weight: 96.1 kg (211 lb 13.8 oz)  Body mass index is 29.55 kg/m².    Intake/Output Summary (Last 24 hours) at 11/25/17 1920  Last data filed at 11/25/17 1507   Gross per 24 hour   Intake                0 ml   Output             1450 ml   Net            -1450 ml      Physical Exam   Constitutional: He appears well-developed and well-nourished. No distress. Nasal cannula in place.   Cardiovascular: Normal rate and regular rhythm.    No murmur heard.  Pulmonary/Chest: Effort normal. No respiratory distress. He has rhonchi.   Abdominal: Soft. Bowel sounds are normal. He exhibits no distension. There is no tenderness.   Musculoskeletal: He exhibits no edema or tenderness.   Neurological: He is alert. He is disoriented.   Skin: Skin is warm and dry.   Nursing note and vitals reviewed.      Significant Labs:   Blood Culture: NGTD  CBC:   Recent Labs  Lab 11/24/17  0405 11/25/17  0623   WBC 12.75* 9.85   HGB 13.4* 13.1*   HCT 40.6 39.3*    133*     CMP:   Recent Labs  Lab 11/24/17  0405 11/25/17  0623    138   K 4.0 3.4*    103   CO2 24 23   * 134*   BUN 16 18   CREATININE 1.2 1.1   CALCIUM 8.3* 8.8   ANIONGAP 8 12   EGFRNONAA 60 >60     Magnesium:   Recent Labs  Lab 11/24/17  0405 11/25/17  0623   MG 1.7 1.8       Significant Imaging: I have reviewed all pertinent imaging results/findings within the past 24 hours.

## 2017-11-26 NOTE — PLAN OF CARE
Problem: Patient Care Overview  Goal: Plan of Care Review  Outcome: Ongoing (interventions implemented as appropriate)  PATIENT IS CURRENTLY ON AN OXYMASK OF 10 LPM WITH AN SPO2 OF 95%. Will continue to monitor.

## 2017-11-26 NOTE — SUBJECTIVE & OBJECTIVE
Interval History: Hypoxic overnight and switched to comfort flow with FiO2 of 85%. Appears comfortable this AM. Did not sleep much overnight per wife.     Review of Systems   Unable to perform ROS: Dementia     Objective:     Vital Signs (Most Recent):  Temp: 98.2 °F (36.8 °C) (11/26/17 1545)  Pulse: 96 (11/26/17 1623)  Resp: 20 (11/26/17 1623)  BP: 129/66 (11/26/17 1545)  SpO2: 95 % (11/26/17 1623) Vital Signs (24h Range):  Temp:  [97.6 °F (36.4 °C)-101.5 °F (38.6 °C)] 98.2 °F (36.8 °C)  Pulse:  [] 96  Resp:  [18-28] 20  SpO2:  [92 %-96 %] 95 %  BP: (129-147)/(66-76) 129/66     Weight: 96.1 kg (211 lb 13.8 oz)  Body mass index is 29.55 kg/m².    Intake/Output Summary (Last 24 hours) at 11/26/17 1655  Last data filed at 11/26/17 1413   Gross per 24 hour   Intake                0 ml   Output              450 ml   Net             -450 ml      Physical Exam   Constitutional: He appears well-developed and well-nourished. No distress. Nasal cannula in place.   Cardiovascular: Normal rate and regular rhythm.    No murmur heard.  Pulmonary/Chest: Effort normal. No respiratory distress. He has no wheezes. He has rhonchi.   Abdominal: Soft. Bowel sounds are normal. He exhibits no distension. There is no tenderness.   Musculoskeletal: He exhibits no edema or tenderness.   Neurological: He is alert. He is disoriented.   Skin: Skin is warm and dry.   Nursing note and vitals reviewed.      Significant Labs:   Blood Culture: NGTD  CBC:   Recent Labs  Lab 11/25/17  0623   WBC 9.85   HGB 13.1*   HCT 39.3*   *     CMP:   Recent Labs  Lab 11/25/17  0623 11/26/17  0624    138   K 3.4* 3.3*    100   CO2 23 26   * 138*   BUN 18 17   CREATININE 1.1 1.0   CALCIUM 8.8 8.7   ANIONGAP 12 12   EGFRNONAA >60 >60     Magnesium:   Recent Labs  Lab 11/25/17  0623 11/26/17  0624   MG 1.8 1.9     Urine Studies:   Recent Labs  Lab 11/26/17  0126   COLORU Yellow   APPEARANCEUA Clear   PHUR 5.0   SPECGRAV 1.025    PROTEINUA 1+*   GLUCUA Negative   KETONESU Negative   BILIRUBINUA Negative   OCCULTUA 2+*   NITRITE Negative   UROBILINOGEN Negative   LEUKOCYTESUR Negative   RBCUA 12*   WBCUA 0   BACTERIA Occasional   SQUAMEPITHEL 4   HYALINECASTS 0       Significant Imaging: I have reviewed all pertinent imaging results/findings within the past 24 hours.

## 2017-11-26 NOTE — ASSESSMENT & PLAN NOTE
S/p right hemiarthroplasty  Fall  Dr. Hallman performed hemiarthroplasty 11/23/17. Turn q2. Non-opiates for pain control. Docusate BID. Lovenox for DVT ppx. PT/OT. Jenni removed today.

## 2017-11-26 NOTE — ASSESSMENT & PLAN NOTE
Acute respiratory failure with hypoxia  CTA with multilobar infiltrates. Blood cultures are NGTD. Continue zosyn to cover possible aspiration (D# 4). Will add vancomycin as he continues to have fever and worsened respiratory status. Wean oxygen as able. BNP is 31, so volume seems to be less of an issue currently. Will check echo.

## 2017-11-26 NOTE — PT/OT/SLP PROGRESS
Speech Language Pathology  Treatment    Sung Buckley   MRN: [id number]   Admitting Diagnosis: Closed subcapital fracture of right femur with routine healing    Diet recommendations: Solid Diet Level: NPO  Liquid Diet Level: Full liquids, Nectar Thick Feed only when awake/alert, HOB to 90 degrees, Small bites/sips, Alternating bites/sips, 1 bite/sip at a time, Remain upright 30 minutes post meal, Meds crushed in puree, Eliminate distractions, Avoid talking while eating, Assistance with meals and Assistance with thickening liquids    SLP Treatment Date: 11/26/17  Speech Start Time: 0930     Speech Stop Time: 0950     Speech Total (min): 20 min       TREATMENT BILLABLE MINUTES:  Treatment Swallowing Dysfunction 10 minutes and Seld Care/Home Management Training 10 minutes    Has the patient been evaluated by SLP for swallowing? : Yes  Keep patient NPO?: No   General Precautions: Standard, aspiration, fall  Current Respiratory Status: other (comment) (oxymask)       Subjective:  Pt awake in bed with high-flow nasal cannula in place. No swallowing difficulties reports with full liquid diet consistencies.    Pain/Comfort  Pain Rating 1: 0/10    Objective:   Patient found with: SCD, oxygen, peripheral IV, telemetry    Intermittent dry cough/throat clearing noted during PO trials of thin liquids. Cough/throat clearing lessened with nectar thickened liquids. Slow A-P transfer, bolus propulsion noted with purees--no overt s/s of aspiration noted. SLP provided skilled education to pt's family at bedside re: swallow study results, diet recs, thickening liquids, MBSS recs, and swallow precautions.     Assessment:  Sung Buckley is a 72 y.o. male with a medical diagnosis of Closed subcapital fracture of right femur with routine healing and presents with overt s/s of aspiration with thin liquids. RECS: Full liquid diet w/ NECTAR THICKENED LIQUIDS; adherence to universal swallow precautions and 1:1 assist. MBSS to be completed next date  (11/27) in order to objectively rule out aspiration and to determine safest PO consistencies.    Discharge recommendations: Discharge Facility/Level Of Care Needs: nursing facility, skilled     Goals:    SLP Goals        Problem: SLP Goal    Goal Priority Disciplines Outcome   SLP Goal     SLP Ongoing (interventions implemented as appropriate)   Description:  SLP Short Term Goals:  1. Patient will successfully participate in ongoing clinical swallow evaluation and tolerate po trials with no overt s/s of aspiration. --ongoing  2. Pt will safely consume full liquid diet w/ NECTAR thickened liquids w/ no overt s/s of aspiration at supervision level.  3. Pt will successfully participate in Modified Barium Swallow study to radiographically assess swallow function, rule out aspiration and determine safest/least restrictive diet.                      Plan:   Patient to be seen Therapy Frequency: 3 x/week   Plan of Care expires: 12/24/17  Plan of Care reviewed with: patient, family, other (see comments) (MITCH Nur)  SLP Follow-up?: Yes  SLP - Next Visit Date: 11/27/17           Jessica Franco CCC-SLP  11/26/2017

## 2017-11-26 NOTE — PLAN OF CARE
Problem: Patient Care Overview  Goal: Plan of Care Review  Pt doing IS @   .75 L of  2.5 L predicted. Will cont to encourage deep breathing and coughing. Pt received on Comfort Flow nasal cannula at  25 lpm and FiO2 .85  SPO2  94 % changed to 40lpm and FiO2 .70 with SpO2 95%.Pt on prn therapy and doesn't require respiratory tx at this time .  No apparent respiratory distress noted and will cont to monitor.

## 2017-11-26 NOTE — ASSESSMENT & PLAN NOTE
S/p right hemiarthroplasty  Fall  Dr. Hallman performed hemiarthroplasty 11/23/17. Turn q2. Non-opiates for pain control. Docusate BID. Lovenox for DVT ppx. PT/OT. Urinating after king removed. Will need SNF placement.

## 2017-11-26 NOTE — PT/OT/SLP PROGRESS
Physical Therapy  Treatment    Sung Buckley   MRN: [id number]   Admitting Diagnosis: Closed subcapital fracture of right femur with routine healing    PT Received On: 11/26/17  PT Start Time: 0940     PT Stop Time: 1008    PT Total Time (min): 28 min       Billable Minutes:  Therapeutic Activity 28    Treatment Type: Treatment  PT/PTA: PTA     PTA Visit Number: 1       General Precautions: Standard, aspiration, fall  Orthopedic Precautions: RLE weight bearing as tolerated, RLE posterior precautions   Braces:      Do you have any cultural, spiritual, Scientologist conflicts, given your current situation?: none reported to PT    Subjective:  Communicated with NSG prior to session.  Pt seems more alert today. Pt's wife present for session, agreeable to PT treatment.          Objective:   Patient found with: peripheral IV, telemetry, SCD, oxygen    Functional Mobility:  Bed Mobility:   Scooting/Bridging: Maximum Assistance, With assist of 2  Supine to Sit: Maximum Assistance, With assist of 2  Sit to Supine: With assist of  2, Maximum Assistance    Transfers:  Sit <> Stand Assistance: Moderate Assistance  Sit <> Stand Assistive Device: Rolling Walker    Gait:   Gait Distance: pt able to take 3 lateral steps w/ max verbal instructions and with RW.   Gait Assistive Device: Rolling walker    Stairs:      Balance:   Static Sit: FAIR: Maintains without assist, but unable to take any challenges   Dynamic Sit: 0: N/A  Static Stand: FAIR: Maintains without assist but unable to take challenges  Dynamic stand: 0: N/A     Therapeutic Activities and Exercises:  Pt able to perform lateral wieght-shifting in RW while standing. Total of 4 trials standing in RW 1+ min durations, verbal / tactile instruction provided for postural awareness, RW safety and transfer sequencing.     AM-PAC 6 CLICK MOBILITY  How much help from another person does this patient currently need?   1 = Unable, Total/Dependent Assistance  2 = A lot, Maximum/Moderate  Assistance  3 = A little, Minimum/Contact Guard/Supervision  4 = None, Modified [place]/Independent         AM-PAC Raw Score CMS G-Code Modifier Level of Impairment Assistance   6 % Total / Unable   7 - 9 CM 80 - 100% Maximal Assist   10 - 14 CL 60 - 80% Moderate Assist   15 - 19 CK 40 - 60% Moderate Assist   20 - 22 CJ 20 - 40% Minimal Assist   23 CI 1-20% SBA / CGA   24 CH 0% Independent/ Mod I     Patient left up in chair with all lines intact and call button in reach.    Assessment:  Sung Buckley is a 72 y.o. male with a medical diagnosis of Closed subcapital fracture of right femur with routine healing. Pt able to assist more with bed mobility today, no episodes of LOB/ SOB note with standing activities.     Rehab identified problem list/impairments: Rehab identified problem list/impairments: weakness, impaired self care skills, impaired endurance, impaired functional mobilty, gait instability, impaired cognition, impaired balance, decreased coordination, decreased safety awareness, decreased ROM, pain, impaired fine motor, orthopedic precautions, impaired joint extensibility    Rehab potential is good.    Activity tolerance: Good    Discharge recommendations: Discharge Facility/Level Of Care Needs: nursing facility, skilled     Barriers to discharge: Barriers to Discharge: Decreased caregiver support    Equipment recommendations:       GOALS:    Physical Therapy Goals        Problem: Physical Therapy Goal    Goal Priority Disciplines Outcome Goal Variances Interventions   Physical Therapy Goal     PT/OT, PT Ongoing (interventions implemented as appropriate)     Description:  Goals to be met by: 17     Patient will increase functional independence with mobility by performin. Supine <> sit with Maximum Assistance  2. Sit to stand transfer with Moderate Assistance  3. Bed to chair transfer with Moderate Assistance using Rolling Walker  4. Gait  x 5 feet with Minimal Assistance using  Rolling Walker.   5. Sitting at edge of bed x 15 minutes with Stand-by Assistance  6. Lower extremity exercise program x 10 reps per handout, with assistance as needed                      PLAN:    Patient to be seen daily  to address the above listed problems via gait training, therapeutic exercises, therapeutic activities  Plan of Care expires: 12/24/17  Plan of Care reviewed with: patient, spouse         Parker Rodriguez, PTA  11/26/2017

## 2017-11-26 NOTE — PLAN OF CARE
Problem: Physical Therapy Goal  Goal: Physical Therapy Goal  Goals to be met by: 17     Patient will increase functional independence with mobility by performin. Supine <> sit with Maximum Assistance  2. Sit to stand transfer with Moderate Assistance  3. Bed to chair transfer with Moderate Assistance using Rolling Walker  4. Gait  x 5 feet with Minimal Assistance using Rolling Walker.   5. Sitting at edge of bed x 15 minutes with Stand-by Assistance  6. Lower extremity exercise program x 10 reps per handout, with assistance as needed     Outcome: Ongoing (interventions implemented as appropriate)  Pt advancing towards established PT goals.

## 2017-11-26 NOTE — PLAN OF CARE
Problem: SLP Goal  Goal: SLP Goal  SLP Short Term Goals:  1. Patient will successfully participate in ongoing clinical swallow evaluation and tolerate po trials with no overt s/s of aspiration. --ongoing  2. Pt will safely consume full liquid diet w/ NECTAR thickened liquids w/ no overt s/s of aspiration at supervision level.  3. Pt will successfully participate in Modified Barium Swallow study to radiographically assess swallow function, rule out aspiration and determine safest/least restrictive diet.   Outcome: Ongoing (interventions implemented as appropriate)  11/26/2017: Swallow re-assessment completed this AM. Pt w/ h/o dysphagia 2/2 progressing dementia--last MBSS completed in May 2016. Pt presenting with dry cough/throat clearing intermittently during PO trials. No wet/gurgly vocal quality noted after and PO trials of thin liquids, nectar thick liquids, or puree. Pt on high flow nasal cannula--regular solids deferred at this time. RECS: Full liquid diet w/ NECTAR THICKENED LIQUIDS; adherence to universal swallow precautions and 1:1 assist. MBSS to be completed next date (11/27) in order to objectively rule out aspiration and to determine safest PO consistencies.  RADHA Adhikari. CCC-SLP  Speech-Language Pathologist

## 2017-11-26 NOTE — PROGRESS NOTES
Patient is currently on a Comfort Flow with 25 LPM; FIO2 of 85%. SpO2 is currently 94%.  Will continue to monitor.

## 2017-11-26 NOTE — PROGRESS NOTES
[facility] Hospital Medicine  Progress Note    Patient Name: Sung Buckley  MRN: [id number]  Patient Class: IP- Inpatient   Admission Date: 11/22/2017  Length of Stay: 4 days  Attending Physician: Dimitrios Duke MD  Primary Care Provider: Maren Monterroso MD        Subjective:     Principal Problem:Closed subcapital fracture of right femur with routine healing    HPI:  Mr. Buckley is a 73 yo WM with mixed vascular and Alzheimer's dementia, HLD, and neuropathy that presented to [facility] for evaluation of right hip pain after a fall at home. His wife reports that she was cooking breakfast when she heard him calling out for her and complaining of pain. He sleeps in a hospital bed and the rails were up at the time. She found him on the floor and he says that he was trying to get up to go to the bathroom. She says that he normally calls for her assistance, but he did not this time. He has a walker and wheelchair that he uses to get around the house with and the wheelchair was beside his bed, but he was not using it at this time. He denies any loss of consciousness, light headedness, palpitations or chest pain prior to the fall. He complains of pain in the right hip that is constant, sharp, and 8/10 in intensity.     Hospital Course:  Required oxygen on admission and had fever overnight the first night prior to the OR. Had right hemiarthroplasty by Dr. Hallman on 11/23/17 and was monitored in the ICU post-operatively. He was started on zosyn for possible aspiration pneumonia. Respiratory status worsened and required comfort flow at 85% FiO2. CTA chest was negative for PTE, but did show multilobar opacities predominantly in the lower lobes that likely represent pneumonia vs pneumonitis. Vancomycin added to antibiotics as well as he continued to have recurrent fever.     Interval History: Hypoxic overnight and switched to comfort flow with FiO2 of 85%. Appears comfortable this AM. Did not sleep much  overnight per wife.     Review of Systems   Unable to perform ROS: Dementia     Objective:     Vital Signs (Most Recent):  Temp: 98.2 °F (36.8 °C) (11/26/17 1545)  Pulse: 96 (11/26/17 1623)  Resp: 20 (11/26/17 1623)  BP: 129/66 (11/26/17 1545)  SpO2: 95 % (11/26/17 1623) Vital Signs (24h Range):  Temp:  [97.6 °F (36.4 °C)-101.5 °F (38.6 °C)] 98.2 °F (36.8 °C)  Pulse:  [] 96  Resp:  [18-28] 20  SpO2:  [92 %-96 %] 95 %  BP: (129-147)/(66-76) 129/66     Weight: 96.1 kg (211 lb 13.8 oz)  Body mass index is 29.55 kg/m².    Intake/Output Summary (Last 24 hours) at 11/26/17 1655  Last data filed at 11/26/17 1413   Gross per 24 hour   Intake                0 ml   Output              450 ml   Net             -450 ml      Physical Exam   Constitutional: He appears well-developed and well-nourished. No distress. Nasal cannula in place.   Cardiovascular: Normal rate and regular rhythm.    No murmur heard.  Pulmonary/Chest: Effort normal. No respiratory distress. He has no wheezes. He has rhonchi.   Abdominal: Soft. Bowel sounds are normal. He exhibits no distension. There is no tenderness.   Musculoskeletal: He exhibits no edema or tenderness.   Neurological: He is alert. He is disoriented.   Skin: Skin is warm and dry.   Nursing note and vitals reviewed.      Significant Labs:   Blood Culture: NGTD  CBC:   Recent Labs  Lab 11/25/17 0623   WBC 9.85   HGB 13.1*   HCT 39.3*   *     CMP:   Recent Labs  Lab 11/25/17 0623 11/26/17 0624    138   K 3.4* 3.3*    100   CO2 23 26   * 138*   BUN 18 17   CREATININE 1.1 1.0   CALCIUM 8.8 8.7   ANIONGAP 12 12   EGFRNONAA >60 >60     Magnesium:   Recent Labs  Lab 11/25/17 0623 11/26/17 0624   MG 1.8 1.9     Urine Studies:   Recent Labs  Lab 11/26/17  0126   COLORU Yellow   APPEARANCEUA Clear   PHUR 5.0   SPECGRAV 1.025   PROTEINUA 1+*   GLUCUA Negative   KETONESU Negative   BILIRUBINUA Negative   OCCULTUA 2+*   NITRITE Negative   UROBILINOGEN Negative    LEUKOCYTESUR Negative   RBCUA 12*   WBCUA 0   BACTERIA Occasional   SQUAMEPITHEL 4   HYALINECASTS 0       Significant Imaging: I have reviewed all pertinent imaging results/findings within the past 24 hours.    Assessment/Plan:      * Closed subcapital fracture of right femur with routine healing    S/p right hemiarthroplasty  Fall  Dr. Hallman performed hemiarthroplasty 11/23/17. Turn q2. Non-opiates for pain control. Docusate BID. Lovenox for DVT ppx. PT/OT. Urinating after king removed. Will need SNF placement.         Aspiration pneumonia    Acute respiratory failure with hypoxia  CTA with multilobar infiltrates. Blood cultures are NGTD. Continue zosyn to cover possible aspiration (D# 4). Will add vancomycin as he continues to have fever and worsened respiratory status. Wean oxygen as able. BNP is 31, so volume seems to be less of an issue currently. Will check echo.         Hypokalemia    Replace        Hypophosphatemia    Continue neutraphos.         Seizure disorder    Continue keppra at 250 mg TID        Iron deficiency anemia secondary to inadequate dietary iron intake    Ferritin was 16 in 2016. Continue iron supplement.           Dysphagia    Appreciate SLP assistance. Planning for MBSS on Monday.           Polyneuropathy    Gait disorder  Continue  folic acid and B12. PT/OT post-operatively. Hold gabapentin while still drowsy.          Depression with anxiety    Continue citalopram.           Mixed Alzheimer's and vascular dementia    Continue namenda and aricept. At high risk for delirium. Hold opiates for now.           Mixed hyperlipidemia    Continue rosuvastatin and gemfibrozil.             VTE Risk Mitigation         Ordered     enoxaparin injection 30 mg  Daily     Route:  Subcutaneous        11/23/17 1242     Place sequential compression device  Until discontinued      11/23/17 1242     High Risk of VTE  Once      11/23/17 1242              Dimitrios Duke MD  Department of Hospital  Medicine   [facility]

## 2017-11-26 NOTE — PROGRESS NOTES
[facility] Hospital Medicine  Progress Note    Patient Name: Sung Buckley  MRN: [id number]  Patient Class: IP- Inpatient   Admission Date: 11/22/2017  Length of Stay: 3 days  Attending Physician: Dimitrios Duke MD  Primary Care Provider: Maren Monterroso MD        Subjective:     Principal Problem:Closed subcapital fracture of right femur with routine healing    HPI:  Mr. Buckley is a 73 yo WM with mixed vascular and Alzheimer's dementia, HLD, and neuropathy that presented to [facility] for evaluation of right hip pain after a fall at home. His wife reports that she was cooking breakfast when she heard him calling out for her and complaining of pain. He sleeps in a hospital bed and the rails were up at the time. She found him on the floor and he says that he was trying to get up to go to the bathroom. She says that he normally calls for her assistance, but he did not this time. He has a walker and wheelchair that he uses to get around the house with and the wheelchair was beside his bed, but he was not using it at this time. He denies any loss of consciousness, light headedness, palpitations or chest pain prior to the fall. He complains of pain in the right hip that is constant, sharp, and 8/10 in intensity.     Hospital Course:  No notes on file    Interval History: More alert today. Tolerated clear liquids.     Review of Systems   Unable to perform ROS: Mental status change     Objective:     Vital Signs (Most Recent):  Temp: (!) 101.1 °F (38.4 °C) (11/25/17 1727)  Pulse: (!) 114 (11/25/17 1727)  Resp: (!) 21 (11/25/17 1615)  BP: (!) 150/71 (11/25/17 1615)  SpO2: (!) 93 % (11/25/17 1659) Vital Signs (24h Range):  Temp:  [97.9 °F (36.6 °C)-101.1 °F (38.4 °C)] 101.1 °F (38.4 °C)  Pulse:  [] 114  Resp:  [18-21] 21  SpO2:  [90 %-94 %] 93 %  BP: (113-150)/(58-74) 150/71     Weight: 96.1 kg (211 lb 13.8 oz)  Body mass index is 29.55 kg/m².    Intake/Output Summary (Last 24 hours) at 11/25/17  1920  Last data filed at 11/25/17 1507   Gross per 24 hour   Intake                0 ml   Output             1450 ml   Net            -1450 ml      Physical Exam   Constitutional: He appears well-developed and well-nourished. No distress. Nasal cannula in place.   Cardiovascular: Normal rate and regular rhythm.    No murmur heard.  Pulmonary/Chest: Effort normal. No respiratory distress. He has rhonchi.   Abdominal: Soft. Bowel sounds are normal. He exhibits no distension. There is no tenderness.   Musculoskeletal: He exhibits no edema or tenderness.   Neurological: He is alert. He is disoriented.   Skin: Skin is warm and dry.   Nursing note and vitals reviewed.      Significant Labs:   Blood Culture: NGTD  CBC:   Recent Labs  Lab 11/24/17 0405 11/25/17  0623   WBC 12.75* 9.85   HGB 13.4* 13.1*   HCT 40.6 39.3*    133*     CMP:   Recent Labs  Lab 11/24/17  0405 11/25/17  0623    138   K 4.0 3.4*    103   CO2 24 23   * 134*   BUN 16 18   CREATININE 1.2 1.1   CALCIUM 8.3* 8.8   ANIONGAP 8 12   EGFRNONAA 60 >60     Magnesium:   Recent Labs  Lab 11/24/17 0405 11/25/17  0623   MG 1.7 1.8       Significant Imaging: I have reviewed all pertinent imaging results/findings within the past 24 hours.    Assessment/Plan:      * Closed subcapital fracture of right femur with routine healing    S/p right hemiarthroplasty  Fall  Dr. Hallman performed hemiarthroplasty 11/23/17. Turn q2. Non-opiates for pain control. Docusate BID. Lovenox for DVT ppx. PT/OT. Arteaga removed today.         Hypophosphatemia    Replace          Bacterial pneumonia    Possible LLL pneumonia. Blood cultures are NGTD. Continue zosyn to cover possible aspiration (D# 3). Wean oxygen as able.         Seizure disorder    Continue keppra at 250 mg TID        Iron deficiency anemia secondary to inadequate dietary iron intake    Ferritin was 16 in 2016. Continue iron supplement.           Polyneuropathy    Gait disorder  Continue  folic  acid and B12. PT/OT post-operatively. Hold gabapentin while still drowsy.          Depression with anxiety    Continue citalopram.           Mixed Alzheimer's and vascular dementia    Dysphagia  Continue namenda and aricept. On dental soft diet with thin liquids at home. At high risk for delirium. Hold opiates for now.           Mixed hyperlipidemia    Continue rosuvastatin and gemfibrozil.             VTE Risk Mitigation         Ordered     enoxaparin injection 30 mg  Daily     Route:  Subcutaneous        11/23/17 1242     Place sequential compression device  Until discontinued      11/23/17 1242     High Risk of VTE  Once      11/23/17 1242              Dimitrios Duke MD  Department of Hospital Medicine   [facility]

## 2017-11-26 NOTE — HOSPITAL COURSE
He required supplemental oxygen on admission and had fever overnight the first night prior to going to the operating room.  He had right hemiarthroplasty by Dr. Abimael Hallman on 11/23/17 and was monitored in the ICU postoperatively.  He was started on piperacillin-tazobactam for possible aspiration pneumonia.  Respiratory status worsened and required comfort flow at 85% FiO2.  CTA chest was negative for pulmonary embolism, but did show multilobar opacities predominantly in the lower lobes that likely represent pneumonia vs pneumonitis.  Vancomycin was added as he continued to have recurrent fever.  Modified barium swallow study was performed on 11/27/17 and dental soft diet with nectar thickened liquids was recommended.  His wife noticed him to be increasingly somnolent.  ABG on 11/29/17 showed hypoxia but no hypercapnia.  Follow-up chest x-ray showed no change.  His wife noticed oral thrush and observed him reaching for parts of his body as if he was in pain.  He was given ketorolac and rested afterward.  On the night of 11/30/17 his wife noted that he did not have restful sleep.  He had not had delirium precautions in place, either, which were started.  Neurology was consulted and his wife told them that he has episodes of staring at home.  They recommended increasing his levetiracetam dose.  EEG showed normal brain activity.  Quetiapine 12.5 mg was tried at night, but it caused agitation.  He participated with Physical/Occupational Therapy but wanted to get back into the bed.  His wife reported that ibuprofen works well for his pain at home, so this was given as needed.  He completed 8 days of both piperacillin-tazobactam and vancomycin.  He remained afebrile without leukocytosis, but on 12/3/17 had severe diarrhea.  He had no abdominal pain or tenderness.  C.diff was negative. Diarrhea improved.  His supplemental oxygen was changed to low flow nasal cannula at 2 liters on 12/6/17, so he was discharged to  [facility] skilled nursing [facility] to continue therapy.  He will continue anticoagulation for DVT prophylaxis post-hip repair.

## 2017-11-27 LAB
ANION GAP SERPL CALC-SCNC: 10 MMOL/L
BACTERIA UR CULT: NO GROWTH
BUN SERPL-MCNC: 15 MG/DL
CALCIUM SERPL-MCNC: 8.7 MG/DL
CHLORIDE SERPL-SCNC: 101 MMOL/L
CO2 SERPL-SCNC: 27 MMOL/L
CREAT SERPL-MCNC: 0.9 MG/DL
DIASTOLIC DYSFUNCTION: YES
ERYTHROCYTE [DISTWIDTH] IN BLOOD BY AUTOMATED COUNT: 14.4 %
EST. GFR  (AFRICAN AMERICAN): >60 ML/MIN/1.73 M^2
EST. GFR  (NON AFRICAN AMERICAN): >60 ML/MIN/1.73 M^2
ESTIMATED PA SYSTOLIC PRESSURE: 37.81
GLUCOSE SERPL-MCNC: 142 MG/DL
HCT VFR BLD AUTO: 32.9 %
HGB BLD-MCNC: 11.3 G/DL
MAGNESIUM SERPL-MCNC: 1.9 MG/DL
MCH RBC QN AUTO: 28.8 PG
MCHC RBC AUTO-ENTMCNC: 34.3 G/DL
MCV RBC AUTO: 84 FL
MITRAL VALVE MOBILITY: NORMAL
PHOSPHATE SERPL-MCNC: 2.2 MG/DL
PLATELET # BLD AUTO: 207 K/UL
PLATELET BLD QL SMEAR: NORMAL
PMV BLD AUTO: 9.2 FL
POCT GLUCOSE: 140 MG/DL (ref 70–110)
POCT GLUCOSE: 144 MG/DL (ref 70–110)
POCT GLUCOSE: 171 MG/DL (ref 70–110)
POTASSIUM SERPL-SCNC: 3.2 MMOL/L
RBC # BLD AUTO: 3.93 M/UL
RETIRED EF AND QEF - SEE NOTES: 55 (ref 55–65)
SODIUM SERPL-SCNC: 138 MMOL/L
TRICUSPID VALVE REGURGITATION: ABNORMAL
VANCOMYCIN TROUGH SERPL-MCNC: 19.6 UG/ML
WBC # BLD AUTO: 9.53 K/UL

## 2017-11-27 PROCEDURE — 84100 ASSAY OF PHOSPHORUS: CPT

## 2017-11-27 PROCEDURE — 25000003 PHARM REV CODE 250

## 2017-11-27 PROCEDURE — 25000003 PHARM REV CODE 250: Performed by: HOSPITALIST

## 2017-11-27 PROCEDURE — 27100171 HC OXYGEN HIGH FLOW UP TO 24 HOURS

## 2017-11-27 PROCEDURE — 93306 TTE W/DOPPLER COMPLETE: CPT | Mod: 26,,, | Performed by: INTERNAL MEDICINE

## 2017-11-27 PROCEDURE — 80202 ASSAY OF VANCOMYCIN: CPT

## 2017-11-27 PROCEDURE — 97535 SELF CARE MNGMENT TRAINING: CPT

## 2017-11-27 PROCEDURE — 11000001 HC ACUTE MED/SURG PRIVATE ROOM

## 2017-11-27 PROCEDURE — 97530 THERAPEUTIC ACTIVITIES: CPT

## 2017-11-27 PROCEDURE — 63600175 PHARM REV CODE 636 W HCPCS: Performed by: HOSPITALIST

## 2017-11-27 PROCEDURE — 63600175 PHARM REV CODE 636 W HCPCS

## 2017-11-27 PROCEDURE — 80048 BASIC METABOLIC PNL TOTAL CA: CPT

## 2017-11-27 PROCEDURE — 36415 COLL VENOUS BLD VENIPUNCTURE: CPT

## 2017-11-27 PROCEDURE — G8996 SWALLOW CURRENT STATUS: HCPCS | Mod: CJ

## 2017-11-27 PROCEDURE — 85027 COMPLETE CBC AUTOMATED: CPT

## 2017-11-27 PROCEDURE — 94761 N-INVAS EAR/PLS OXIMETRY MLT: CPT

## 2017-11-27 PROCEDURE — 25000003 PHARM REV CODE 250: Performed by: NURSE PRACTITIONER

## 2017-11-27 PROCEDURE — 83735 ASSAY OF MAGNESIUM: CPT

## 2017-11-27 PROCEDURE — G8997 SWALLOW GOAL STATUS: HCPCS | Mod: CJ

## 2017-11-27 PROCEDURE — G8998 SWALLOW D/C STATUS: HCPCS | Mod: CJ

## 2017-11-27 PROCEDURE — 93306 TTE W/DOPPLER COMPLETE: CPT

## 2017-11-27 PROCEDURE — 92611 MOTION FLUOROSCOPY/SWALLOW: CPT

## 2017-11-27 RX ORDER — FUROSEMIDE 10 MG/ML
40 INJECTION INTRAMUSCULAR; INTRAVENOUS ONCE
Status: COMPLETED | OUTPATIENT
Start: 2017-11-27 | End: 2017-11-27

## 2017-11-27 RX ORDER — POTASSIUM CHLORIDE 20 MEQ/15ML
40 SOLUTION ORAL 2 TIMES DAILY
Status: COMPLETED | OUTPATIENT
Start: 2017-11-27 | End: 2017-11-28

## 2017-11-27 RX ORDER — POTASSIUM CHLORIDE 20 MEQ/15ML
40 SOLUTION ORAL ONCE
Status: DISCONTINUED | OUTPATIENT
Start: 2017-11-27 | End: 2017-11-27

## 2017-11-27 RX ADMIN — GEMFIBROZIL 600 MG: 600 TABLET ORAL at 09:11

## 2017-11-27 RX ADMIN — PIPERACILLIN AND TAZOBACTAM 4.5 G: 4; .5 INJECTION, POWDER, LYOPHILIZED, FOR SOLUTION INTRAVENOUS; PARENTERAL at 03:11

## 2017-11-27 RX ADMIN — DOCUSATE SODIUM 100 MG: 100 CAPSULE, LIQUID FILLED ORAL at 09:11

## 2017-11-27 RX ADMIN — LEVETIRACETAM 250 MG: 250 TABLET, FILM COATED ORAL at 02:11

## 2017-11-27 RX ADMIN — ROSUVASTATIN CALCIUM 10 MG: 10 TABLET, FILM COATED ORAL at 05:11

## 2017-11-27 RX ADMIN — NEOMYCIN SULFATE, POLYMYXIN B SULFATE AND GRAMICIDIN 1 DROP: 1.75; 10000; .025 SOLUTION/ DROPS OPHTHALMIC at 11:11

## 2017-11-27 RX ADMIN — NEOMYCIN SULFATE, POLYMYXIN B SULFATE AND GRAMICIDIN 1 DROP: 1.75; 10000; .025 SOLUTION/ DROPS OPHTHALMIC at 06:11

## 2017-11-27 RX ADMIN — POTASSIUM & SODIUM PHOSPHATES POWDER PACK 280-160-250 MG 2 PACKET: 280-160-250 PACK at 12:11

## 2017-11-27 RX ADMIN — FERROUS SULFATE TAB EC 325 MG (65 MG FE EQUIVALENT) 325 MG: 325 (65 FE) TABLET DELAYED RESPONSE at 09:11

## 2017-11-27 RX ADMIN — VANCOMYCIN HYDROCHLORIDE 1000 MG: 1 INJECTION, POWDER, LYOPHILIZED, FOR SOLUTION INTRAVENOUS at 09:11

## 2017-11-27 RX ADMIN — POTASSIUM & SODIUM PHOSPHATES POWDER PACK 280-160-250 MG 2 PACKET: 280-160-250 PACK at 09:11

## 2017-11-27 RX ADMIN — CITALOPRAM HYDROBROMIDE 20 MG: 20 TABLET ORAL at 05:11

## 2017-11-27 RX ADMIN — FOLIC ACID 1 MG: 1 TABLET ORAL at 09:11

## 2017-11-27 RX ADMIN — PANTOPRAZOLE SODIUM 40 MG: 40 TABLET, DELAYED RELEASE ORAL at 09:11

## 2017-11-27 RX ADMIN — PIPERACILLIN AND TAZOBACTAM 4.5 G: 4; .5 INJECTION, POWDER, LYOPHILIZED, FOR SOLUTION INTRAVENOUS; PARENTERAL at 06:11

## 2017-11-27 RX ADMIN — FERROUS SULFATE TAB EC 325 MG (65 MG FE EQUIVALENT) 325 MG: 325 (65 FE) TABLET DELAYED RESPONSE at 12:11

## 2017-11-27 RX ADMIN — ACETAMINOPHEN 650 MG: 325 TABLET ORAL at 09:11

## 2017-11-27 RX ADMIN — MUPIROCIN 1 G: 20 OINTMENT TOPICAL at 09:11

## 2017-11-27 RX ADMIN — NEOMYCIN SULFATE, POLYMYXIN B SULFATE AND GRAMICIDIN 1 DROP: 1.75; 10000; .025 SOLUTION/ DROPS OPHTHALMIC at 12:11

## 2017-11-27 RX ADMIN — ENOXAPARIN SODIUM 30 MG: 100 INJECTION SUBCUTANEOUS at 09:11

## 2017-11-27 RX ADMIN — DONEPEZIL HYDROCHLORIDE 10 MG: 5 TABLET, FILM COATED ORAL at 06:11

## 2017-11-27 RX ADMIN — POTASSIUM CHLORIDE 40 MEQ: 20 SOLUTION ORAL at 09:11

## 2017-11-27 RX ADMIN — LEVETIRACETAM 250 MG: 250 TABLET, FILM COATED ORAL at 09:11

## 2017-11-27 RX ADMIN — LEVOTHYROXINE SODIUM 25 MCG: 25 TABLET ORAL at 06:11

## 2017-11-27 RX ADMIN — POTASSIUM & SODIUM PHOSPHATES POWDER PACK 280-160-250 MG 2 PACKET: 280-160-250 PACK at 05:11

## 2017-11-27 RX ADMIN — LEVETIRACETAM 250 MG: 250 TABLET, FILM COATED ORAL at 06:11

## 2017-11-27 RX ADMIN — FERROUS SULFATE TAB EC 325 MG (65 MG FE EQUIVALENT) 325 MG: 325 (65 FE) TABLET DELAYED RESPONSE at 05:11

## 2017-11-27 RX ADMIN — FUROSEMIDE 40 MG: 10 INJECTION, SOLUTION INTRAMUSCULAR; INTRAVENOUS at 09:11

## 2017-11-27 RX ADMIN — MEMANTINE HYDROCHLORIDE 10 MG: 5 TABLET ORAL at 09:11

## 2017-11-27 RX ADMIN — PIPERACILLIN AND TAZOBACTAM 4.5 G: 4; .5 INJECTION, POWDER, LYOPHILIZED, FOR SOLUTION INTRAVENOUS; PARENTERAL at 11:11

## 2017-11-27 NOTE — PLAN OF CARE
Problem: Physical Therapy Goal  Goal: Physical Therapy Goal  Goals to be met by: 17     Patient will increase functional independence with mobility by performin. Supine <> sit with Maximum Assistance  2. Sit to stand transfer with Moderate Assistance  3. Bed to chair transfer with Moderate Assistance using Rolling Walker  4. Gait  x 5 feet with Minimal Assistance using Rolling Walker.   5. Sitting at edge of bed x 15 minutes with Stand-by Assistance  6. Lower extremity exercise program x 10 reps per handout, with assistance as needed     Outcome: Ongoing (interventions implemented as appropriate)  Patient with improved participation with PT

## 2017-11-27 NOTE — PT/OT/SLP PROGRESS
Physical Therapy      Sung Buckley  MRN: [id number]    Patient NESTOR for swallow study will follow as able    José Partida, PT

## 2017-11-27 NOTE — PT/OT/SLP PROGRESS
Occupational Therapy  Visit Attempt    Sung Buckley  MRN: [id number]    Patient not seen today secondary to just returning from MBSS and needing nsg care 2* soiled. Will follow-up later, as time permits.    MATTY Matamoros  11/27/2017

## 2017-11-27 NOTE — PLAN OF CARE
Problem: Patient Care Overview  Goal: Plan of Care Review  Outcome: Ongoing (interventions implemented as appropriate)  Pt's SpO2 93% on HFNC 25 lpm and 60% FiO2. Prn tx not required. No respiratory distress noted. Will continue to monitor SpO2.

## 2017-11-27 NOTE — PLAN OF CARE
Problem: Patient Care Overview  Goal: Plan of Care Review  Outcome: Ongoing (interventions implemented as appropriate)  Pts safety maintained, wife at the bedside, bed alarm on. Seizure precautions continued. Incision CDI. Spiking temp again. On comfort high flow, weaning O2 slowly. Incision CDI.  No complains of pain, N/V, distress. Responsive through the night. Some bloody sputum, most likely from drying out sinuses by NC. NSR/Sinus Tach on heart monitor.

## 2017-11-27 NOTE — PT/OT/SLP PROGRESS
Physical Therapy  Treatment    Sung Buckley   MRN: [id number]   Admitting Diagnosis: Closed subcapital fracture of right femur with routine healing    PT Received On: 11/27/17  PT Start Time: 1325     PT Stop Time: 1350    PT Total Time (min): 25 min       Billable Minutes:  Therapeutic Activity 15    Treatment Type: Treatment  PT/PTA: PT     PTA Visit Number: 1       General Precautions: Standard, fall  Orthopedic Precautions: RLE weight bearing as tolerated, RLE posterior precautions   Braces: N/A    Do you have any cultural, spiritual, Worship conflicts, given your current situation?: none reported to PT    Subjective:  Communicated with primary nurse prior to session.      Pain/Comfort  Pain Rating 1: 10/10  Pain Addressed 1: Distraction, Reposition  Pain Rating Post-Intervention 1: 10/10    Objective:   Patient found with: SCD, telemetry, oxygen, peripheral IV    Functional Mobility:  Bed Mobility:   Supine to Sit: Maximum Assistance  Sit to Supine: Maximum Assistance    Transfers:  Sit <> Stand Assistance: Moderate Assistance  Sit <> Stand Assistive Device: Rolling Walker    Gait:   Gait Distance: unable to take steps today     Stairs:  na    Balance:   Static Sit: FAIR: Maintains without assist, but unable to take any challenges   Dynamic Sit: FAIR: Cannot move trunk without losing balance  Static Stand: POOR+: Needs MINIMAL assist to maintain  Dynamic stand: 0: N/A     Therapeutic Activities and Exercises:  na     AM-PAC 6 CLICK MOBILITY  How much help from another person does this patient currently need?   1 = Unable, Total/Dependent Assistance  2 = A lot, Maximum/Moderate Assistance  3 = A little, Minimum/Contact Guard/Supervision  4 = None, Modified [place]/Independent    Turning over in bed (including adjusting bedclothes, sheets and blankets)?: 2  Sitting down on and standing up from a chair with arms (e.g., wheelchair, bedside commode, etc.): 2  Moving from lying on back to sitting on the side of  the bed?: 2  Moving to and from a bed to a chair (including a wheelchair)?: 2  Need to walk in hospital room?: 1  Climbing 3-5 steps with a railing?: 1  Total Score: 10    AM-PAC Raw Score CMS G-Code Modifier Level of Impairment Assistance   6 % Total / Unable   7 - 9 CM 80 - 100% Maximal Assist   10 - 14 CL 60 - 80% Moderate Assist   15 - 19 CK 40 - 60% Moderate Assist   20 - 22 CJ 20 - 40% Minimal Assist   23 CI 1-20% SBA / CGA   24 CH 0% Independent/ Mod I     Patient left HOB elevated with all lines intact, bed alarm on and family present.    Assessment:  Sung Buckley is a 72 y.o. male with a medical diagnosis of Closed subcapital fracture of right femur with routine healing and presents with improving functional mobility.    Rehab identified problem list/impairments: Rehab identified problem list/impairments: weakness, gait instability, impaired self care skills, impaired functional mobilty, pain, impaired cognition, decreased ROM, impaired balance, decreased lower extremity function, edema, abnormal tone    Rehab potential is fair.    Activity tolerance: Fair    Discharge recommendations: Discharge Facility/Level Of Care Needs: nursing facility, skilled     Barriers to discharge: Barriers to Discharge: Decreased caregiver support    Equipment recommendations: Equipment Needed After Discharge: none     GOALS:    Physical Therapy Goals        Problem: Physical Therapy Goal    Goal Priority Disciplines Outcome Goal Variances Interventions   Physical Therapy Goal     PT/OT, PT Ongoing (interventions implemented as appropriate)     Description:  Goals to be met by: 17     Patient will increase functional independence with mobility by performin. Supine <> sit with Maximum Assistance  2. Sit to stand transfer with Moderate Assistance  3. Bed to chair transfer with Moderate Assistance using Rolling Walker  4. Gait  x 5 feet with Minimal Assistance using Rolling Walker.   5. Sitting at edge of bed x  15 minutes with Stand-by Assistance  6. Lower extremity exercise program x 10 reps per handout, with assistance as needed             Problem: Physical Therapy Goal    Goal Priority Disciplines Outcome Goal Variances Interventions   Physical Therapy Goal     PT/OT, PT                      PLAN:    Patient to be seen daily  to address the above listed problems via gait training, therapeutic activities, therapeutic exercises  Plan of Care expires: 12/24/17  Plan of Care reviewed with: patient, grandchild(susanna)         José Partida, PT  11/27/2017

## 2017-11-27 NOTE — PLAN OF CARE
Problem: Occupational Therapy Goal  Goal: Occupational Therapy Goal  Goals to be met by: 12/24     Patient will increase functional independence with ADLs by performing:    Feeding with Set-up Assistance.  Grooming while seated with Minimal Assistance.  Sitting at edge of bed x10 minutes with Moderate Assistance. --MET 11/27  Rolling to Bilateral with Moderate Assistance.   Supine to sit with Maximum Assistance.  Toilet transfer to bedside commode with Maximum Assistance.     Outcome: Ongoing (interventions implemented as appropriate)  Patient with improved bed mobility and static sitting. Remains limited by decreased strength and endurance. Will benefit from skilled OT to address functional deficits.

## 2017-11-27 NOTE — PROGRESS NOTES
The Sw called the pt's Locet in.    11:36am The [facility] faxed the pt's updated info to Thania at [facility] via [facility].

## 2017-11-27 NOTE — PROCEDURES
Modifed Barium Swallow Study  Speech Start Time: 1010  Speech Stop Time: 1030   Self care/Education: 10 mins  Speech Total (min): 30 min    SLP Treatment Date: 11/27/17    Reason for Referral  Patient was referred for a Modified Barium Swallow Study to assess the efficiency of his/her swallow function, rule out aspiration and make recommendations regarding safe dietary consistencies, effective compensatory strategies, and safe eating environment.     Diagnosis   Closed subcapital fracture of right femur with routine healing    Past Medical History:   Diagnosis Date    CTS (carpal tunnel syndrome)     Depression     GERD (gastroesophageal reflux disease)     Kidney stones     Neuropathy     Other and unspecified hyperlipidemia      Past Surgical History:   Procedure Laterality Date    CARPAL TUNNEL RELEASE          General Precautions: aspiration, fall  Current Respiratory Status: other (comment) (oxymask)    Recommendations  SLP recs: Dental soft/finely chopped meats/Nectar thick liquids with double swallow technique, NO straws, liquid wash after bites, meds crushed and buried in puree textures, and other universal swallow precautions (upright at 90 degrees, alternate sips/bites, 1 bite/sip at a time, remain upright for 30 mins after meals, etc.)      Oral Peripheral Examination  WFL    Consistencies Assessed  THIN: 5cc x1, 10cc x1 of thin liquids barium via cup, 15cc x1 of thin liquid barium via straw  NECTAR: 5cc x2, 10cc x1 of nectar thick liquid barium via cup, 15cc x1 of nectar thick liquid barium via straw  PUREE: tsp amount of barium pudding x1  DENTAL SOFT: tsp amount of diced peaches (coated in barium powder) x1  HARD SOLIDS: 1/2 corner of avery cracker coated in barium paste x1      Oral Phase:  Pt presents with mild oral dysphagia. Pt demonstrated slightly prolonged mastication skills, fair oral clearance/sensation, fair tongue strength, slightly reduced oral motor coordination across all  consistencies assesed. Pt demonstrated slight delay in AP transfer for hard solid textures. Pt was also observed with premature spillage into pharyngeal cavity for thin liquids before the swallow.      Pharyngeal Phase:  Pt presents with mild pharyngeal dysphagia. Pt demonstrated reduced laryngeal elevation/excursion and delayed trigger of swallow-- swallow triggered at level of vallecula for all consistencies. Pt also demonstrated reduced epiglottic inversion, as pt was observed with deep penetration (to the level of the vocal cords) during the swallow for thin liquids at 10cc x1 via cup and 15cc x1 via straw, and flash penetration (above the level of the vocal cords) during the swallow for nectar thick liquids at 15cc x1 via straw. However, pt demonstrated good airway protection as all penetrated materials were ejected from airway entrance and did not enter airway past the level of the vocal cords.     Pt was observed with reduced tongue base retraction and reduced pharyngeal clearance after the swallow c/b moderate residuals s/p swallow in vallecula for thin liquids and puree textures, and mild residuals in vallecula for nectar thick liquids. SLP instructed pt to perform double swallow technique to adequately clear residuals from vallecula. Pt demonstrated success with double swallow technique in clearing residuals for nectar thick liquids. However, pt did not adequately clear residuals for thin liquids and puree textures with double swallow technique. SLP instructed pt to perform liquid wash (with nectar thick liquids via cup) to clear residuals from puree textures. Pt demonstrated success with liquid wash technique (with nectar thick liquids) in clearing residuals from vallecula.   SLP instructed pt to perform chin tuck technique to clear residuals for thin liquids. Pt was unsuccessful with chin tuck technique in clearing thin liquids residuals. Thin liquid residuals were observed to trickle down pt's airway  into the pt's pyriform sinuses after the swallow, which increases pt's risk for aspiration after the swallow.    Pt was observed with throat clearing/weak dry cough after every swallow during MBSS. However, pt demonstrated no aspiration under fluoro across all consistencies.      Strategies/Compensatory Techniques Utilized:     1. SLP instructed pt to perform double swallow technique to adequately clear residuals from vallecula. Pt demonstrated success with double swallow technique in clearing residuals for nectar thick liquids. However, pt did not adequately clear residuals for thin liquids and puree textures with double swallow technique.   2. SLP instructed pt to perform liquid wash (with nectar thick liquids via cup) to clear residuals from puree textures. Pt demonstrated success with liquid wash technique (with nectar thick liquids) in clearing residuals from vallecula.     3. SLP instructed pt to perform chin tuck technique to clear residuals for thin liquids. Pt was unsuccessful with chin tuck technique in clearing thin liquids residuals. Thin liquid residuals were observed to trickle down pt's airway into the pt's pyriform sinuses after the swallow, which increases pt's risk for aspiration after the swallow.      Cervical Esophageal Phase  UES appeared to accommodate all bolus types without evidence of stasis or retrograde movement    Impressions  Pt presents with mild oropharyngeal dysphagia c/b above mentioned deficits. Pt is at a mild-moderate risk for aspiration 2/2 pt's reduced pharyngeal clearance s/p swallow. (see above details)    Prognosis/Plan/Education  Pt's prognosis is fair given pt's diagnoses/co-morbidities and family support.   SLP educated pt and pt's wife on MBSS results and diet recs/swallow precautions. SLP also provided skilled education, via visual demonstration, to pt and pt's wife on how to appropriately thicken all liquids to nectar thick liquids. Pt's wife acknowledged and confirmed  understanding, via teach back to SLP. SLP also educated MITCH Hubbard and MD [facility] on pt's MBSS results and diet recs/swallow precautions.    Care Plan    SLP Goals        Problem: SLP Goal    Goal Priority Disciplines Outcome   SLP Goal     SLP Ongoing (interventions implemented as appropriate)   Description:  SLP Short Term Goals:  1. Patient will successfully participate in ongoing clinical swallow evaluation and tolerate po trials with no overt s/s of aspiration. --ongoing  2. Pt will safely consume full liquid diet w/ NECTAR thickened liquids w/ no overt s/s of aspiration at supervision level.  3. Pt will successfully participate in Modified Barium Swallow study to radiographically assess swallow function, rule out aspiration and determine safest/least restrictive diet.                      G-Codes  Functional Assessment Tool Used: NOMS  Score: 5  Functional Limitations: Swallowing  Swallow Current Status (): LENIN  Swallow Goal Status (): LENIN  Swallow Discharge Status (): FAYE Mishra, CF-SLP  Speech-Language Pathologist   11/27/2017

## 2017-11-27 NOTE — PLAN OF CARE
Problem: Patient Care Overview  Goal: Plan of Care Review  Outcome: Ongoing (interventions implemented as appropriate)  Patient is currently wearing a COMFORT FLOW HFNC WITH A FLOW OF 40 LPM; FIO2 OF 60%. SPO2 OF 92%. Will continue to monitor.

## 2017-11-27 NOTE — PT/OT/SLP PROGRESS
Occupational Therapy  Treatment    Sung Buckley   MRN: [id number]   Admitting Diagnosis: Closed subcapital fracture of right femur with routine healing    OT Date of Treatment: 11/27/17   OT Start Time: 1325  OT Stop Time: 1351  OT Total Time (min): 26 mins with PT    Billable Minutes:  Therapeutic Activity 13    General Precautions: Standard, aspiration, nectar thick, fall  Orthopedic Precautions: RLE weight bearing as tolerated, RLE posterior precautions  Braces: ABD pillow    Subjective:  Communicated with Haydee hobbs prior to session.    Pain/Comfort  Pain Rating 1: 10/10  Location - Side 1: Right  Location - Orientation 1: generalized  Location 1: leg  Pain Addressed 1: Reposition, Distraction, Cessation of Activity  Pain Rating Post-Intervention 1:  (did not rate)    Objective:  Patient found with: SCD, oxygen, peripheral IV, telemetry (Hi-Flow NC 25L, 50%)     Functional Mobility:  Bed Mobility:  Scooting/Bridging: Maximum Assistance  Supine to Sit: Maximum Assistance  Sit to Supine: Maximum Assistance    Transfers:   Sit <> Stand Assistance: Moderate Assistance (Min (A) of 2; Mod (A) of 1)  Sit <> Stand Assistive Device: Rolling Walker    Functional Ambulation: N/A    Activities of Daily Living:  Grooming Position: Seated, EOB  Grooming Level of Assistance: Total assistance (for yanker suction use throughout session)    Balance:   Static Sit: FAIR: Maintains without assist, but unable to take any challenges   Dynamic Sit: FAIR: Cannot move trunk without losing balance  Static Stand: POOR+: Needs MINIMAL assist to maintain  Dynamic stand: POOR: N/A    Therapeutic Activities and Exercises:  Patient with increased time to answer questions; minimal verbal convo. Bed mob as noted above. Patient sat EOB x 20 mins with SBA-CGA. Total (A) for yanker suction use throughout session. Functional transition from EOB using RW with Min-mod (A) of 2 and VCs for erect posture. Patient unable to fully WB on RLE to  L  "foot to step laterally to HOB. Depx2 to HOB via drawsheet. ABD pillow put in place upon end of session.    AM-PAC 6 CLICK ADL   How much help from another person does this patient currently need?   1 = Unable, Total/Dependent Assistance  2 = A lot, Maximum/Moderate Assistance  3 = A little, Minimum/Contact Guard/Supervision  4 = None, Modified [place]/Independent    Putting on and taking off regular lower body clothing? : 1  Bathing (including washing, rinsing, drying)?: 1  Toileting, which includes using toilet, bedpan, or urinal? : 1  Putting on and taking off regular upper body clothing?: 2  Taking care of personal grooming such as brushing teeth?: 2  Eating meals?: 2  Total Score: 9     AM-PAC Raw Score CMS "G-Code Modifier Level of Impairment Assistance   6 % Total / Unable   7 - 8 CM 80 - 100% Maximal Assist   9-13 CL 60 - 80% Moderate Assist   14 - 19 CK 40 - 60% Moderate Assist   20 - 22 CJ 20 - 40% Minimal Assist   23 CI 1-20% SBA / CGA   24 CH 0% Independent/ Mod I       Patient left HOB elevated with all lines intact, call button in reach, bed alarm on, RN notified and granddaughter present    ASSESSMENT: Patient with improved bed mobility and static sitting. Remains limited by decreased strength and endurance. Will benefit from skilled OT to address functional deficits.   Sung Buckley is a 72 y.o. male with a medical diagnosis of Closed subcapital fracture of right femur with routine healing     Rehab identified problem list/impairments: Rehab identified problem list/impairments: weakness, impaired endurance, impaired self care skills, impaired functional mobilty, gait instability, impaired balance, decreased upper extremity function, decreased lower extremity function, decreased ROM, impaired cardiopulmonary response to activity, decreased safety awareness, impaired cognition, pain, orthopedic precautions    Rehab potential is fair.    Activity tolerance: Fair    Discharge recommendations: " Discharge Facility/Level Of Care Needs: nursing facility, skilled     Barriers to discharge: Barriers to Discharge: Decreased caregiver support    Equipment recommendations: none     GOALS:    Occupational Therapy Goals        Problem: Occupational Therapy Goal    Goal Priority Disciplines Outcome Interventions   Occupational Therapy Goal     OT, PT/OT Ongoing (interventions implemented as appropriate)    Description:  Goals to be met by: 12/24     Patient will increase functional independence with ADLs by performing:    Feeding with Set-up Assistance.  Grooming while seated with Minimal Assistance.  Sitting at edge of bed x10 minutes with Moderate Assistance. --MET 11/27  Rolling to Bilateral with Moderate Assistance.   Supine to sit with Maximum Assistance.  Toilet transfer to bedside commode with Maximum Assistance.                       Plan:  Patient to be seen 5 x/week to address the above listed problems via self-care/home management, therapeutic activities, therapeutic exercises  Plan of Care expires: 12/24/17  Plan of Care reviewed with: patient, grandchild(susanna)         MATTY Matamoros  11/27/2017
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
## 2017-11-28 PROBLEM — G40.909 SEIZURE DISORDER: Chronic | Status: ACTIVE | Noted: 2017-11-22

## 2017-11-28 LAB
ANION GAP SERPL CALC-SCNC: 11 MMOL/L
BACTERIA BLD CULT: NORMAL
BACTERIA BLD CULT: NORMAL
BUN SERPL-MCNC: 19 MG/DL
CALCIUM SERPL-MCNC: 8.9 MG/DL
CHLORIDE SERPL-SCNC: 101 MMOL/L
CO2 SERPL-SCNC: 26 MMOL/L
CREAT SERPL-MCNC: 0.9 MG/DL
EST. GFR  (AFRICAN AMERICAN): >60 ML/MIN/1.73 M^2
EST. GFR  (NON AFRICAN AMERICAN): >60 ML/MIN/1.73 M^2
GLUCOSE SERPL-MCNC: 131 MG/DL
MAGNESIUM SERPL-MCNC: 2.1 MG/DL
PHOSPHATE SERPL-MCNC: 2.3 MG/DL
POCT GLUCOSE: 141 MG/DL (ref 70–110)
POTASSIUM SERPL-SCNC: 3.3 MMOL/L
SODIUM SERPL-SCNC: 138 MMOL/L

## 2017-11-28 PROCEDURE — 97530 THERAPEUTIC ACTIVITIES: CPT

## 2017-11-28 PROCEDURE — 97535 SELF CARE MNGMENT TRAINING: CPT

## 2017-11-28 PROCEDURE — 97110 THERAPEUTIC EXERCISES: CPT

## 2017-11-28 PROCEDURE — 25000003 PHARM REV CODE 250: Performed by: NURSE PRACTITIONER

## 2017-11-28 PROCEDURE — 25000003 PHARM REV CODE 250: Performed by: HOSPITALIST

## 2017-11-28 PROCEDURE — 11000001 HC ACUTE MED/SURG PRIVATE ROOM

## 2017-11-28 PROCEDURE — 94761 N-INVAS EAR/PLS OXIMETRY MLT: CPT

## 2017-11-28 PROCEDURE — 92526 ORAL FUNCTION THERAPY: CPT

## 2017-11-28 PROCEDURE — 83735 ASSAY OF MAGNESIUM: CPT

## 2017-11-28 PROCEDURE — 63600175 PHARM REV CODE 636 W HCPCS

## 2017-11-28 PROCEDURE — 27100171 HC OXYGEN HIGH FLOW UP TO 24 HOURS

## 2017-11-28 PROCEDURE — 36415 COLL VENOUS BLD VENIPUNCTURE: CPT

## 2017-11-28 PROCEDURE — 84100 ASSAY OF PHOSPHORUS: CPT

## 2017-11-28 PROCEDURE — 27100092 HC HIGH FLOW DELIVERY CANNULA

## 2017-11-28 PROCEDURE — 80048 BASIC METABOLIC PNL TOTAL CA: CPT

## 2017-11-28 PROCEDURE — 63600175 PHARM REV CODE 636 W HCPCS: Performed by: HOSPITALIST

## 2017-11-28 RX ADMIN — LEVETIRACETAM 250 MG: 250 TABLET, FILM COATED ORAL at 02:11

## 2017-11-28 RX ADMIN — ENOXAPARIN SODIUM 30 MG: 100 INJECTION SUBCUTANEOUS at 10:11

## 2017-11-28 RX ADMIN — MEMANTINE HYDROCHLORIDE 10 MG: 5 TABLET ORAL at 09:11

## 2017-11-28 RX ADMIN — POTASSIUM CHLORIDE 40 MEQ: 20 SOLUTION ORAL at 10:11

## 2017-11-28 RX ADMIN — LEVETIRACETAM 250 MG: 250 TABLET, FILM COATED ORAL at 09:11

## 2017-11-28 RX ADMIN — DONEPEZIL HYDROCHLORIDE 10 MG: 5 TABLET, FILM COATED ORAL at 06:11

## 2017-11-28 RX ADMIN — FERROUS SULFATE TAB EC 325 MG (65 MG FE EQUIVALENT) 325 MG: 325 (65 FE) TABLET DELAYED RESPONSE at 02:11

## 2017-11-28 RX ADMIN — FOLIC ACID 1 MG: 1 TABLET ORAL at 10:11

## 2017-11-28 RX ADMIN — FERROUS SULFATE TAB EC 325 MG (65 MG FE EQUIVALENT) 325 MG: 325 (65 FE) TABLET DELAYED RESPONSE at 08:11

## 2017-11-28 RX ADMIN — ACETAMINOPHEN 650 MG: 325 TABLET ORAL at 06:11

## 2017-11-28 RX ADMIN — POTASSIUM & SODIUM PHOSPHATES POWDER PACK 280-160-250 MG 2 PACKET: 280-160-250 PACK at 05:11

## 2017-11-28 RX ADMIN — VANCOMYCIN HYDROCHLORIDE 1000 MG: 1 INJECTION, POWDER, LYOPHILIZED, FOR SOLUTION INTRAVENOUS at 10:11

## 2017-11-28 RX ADMIN — LEVOTHYROXINE SODIUM 25 MCG: 25 TABLET ORAL at 06:11

## 2017-11-28 RX ADMIN — PANTOPRAZOLE SODIUM 40 MG: 40 TABLET, DELAYED RELEASE ORAL at 10:11

## 2017-11-28 RX ADMIN — DOCUSATE SODIUM 100 MG: 100 CAPSULE, LIQUID FILLED ORAL at 09:11

## 2017-11-28 RX ADMIN — POTASSIUM & SODIUM PHOSPHATES POWDER PACK 280-160-250 MG 2 PACKET: 280-160-250 PACK at 02:11

## 2017-11-28 RX ADMIN — PIPERACILLIN AND TAZOBACTAM 4.5 G: 4; .5 INJECTION, POWDER, LYOPHILIZED, FOR SOLUTION INTRAVENOUS; PARENTERAL at 05:11

## 2017-11-28 RX ADMIN — NEOMYCIN SULFATE, POLYMYXIN B SULFATE AND GRAMICIDIN 1 DROP: 1.75; 10000; .025 SOLUTION/ DROPS OPHTHALMIC at 06:11

## 2017-11-28 RX ADMIN — FOLIC ACID 1 MG: 1 TABLET ORAL at 09:11

## 2017-11-28 RX ADMIN — POTASSIUM & SODIUM PHOSPHATES POWDER PACK 280-160-250 MG 2 PACKET: 280-160-250 PACK at 09:11

## 2017-11-28 RX ADMIN — VANCOMYCIN HYDROCHLORIDE 1000 MG: 1 INJECTION, POWDER, LYOPHILIZED, FOR SOLUTION INTRAVENOUS at 09:11

## 2017-11-28 RX ADMIN — GEMFIBROZIL 600 MG: 600 TABLET ORAL at 10:11

## 2017-11-28 RX ADMIN — FERROUS SULFATE TAB EC 325 MG (65 MG FE EQUIVALENT) 325 MG: 325 (65 FE) TABLET DELAYED RESPONSE at 05:11

## 2017-11-28 RX ADMIN — NEOMYCIN SULFATE, POLYMYXIN B SULFATE AND GRAMICIDIN 1 DROP: 1.75; 10000; .025 SOLUTION/ DROPS OPHTHALMIC at 01:11

## 2017-11-28 RX ADMIN — PIPERACILLIN AND TAZOBACTAM 4.5 G: 4; .5 INJECTION, POWDER, LYOPHILIZED, FOR SOLUTION INTRAVENOUS; PARENTERAL at 06:11

## 2017-11-28 RX ADMIN — POTASSIUM & SODIUM PHOSPHATES POWDER PACK 280-160-250 MG 2 PACKET: 280-160-250 PACK at 07:11

## 2017-11-28 RX ADMIN — LEVETIRACETAM 250 MG: 250 TABLET, FILM COATED ORAL at 06:11

## 2017-11-28 NOTE — PLAN OF CARE
Problem: Patient Care Overview  Goal: Plan of Care Review  Outcome: Ongoing (interventions implemented as appropriate)  Patient is AAOx3, able to answer appropriately, but is confused at times. Spouse is at the bedside. Incision site WNL, no drainage noted, no redness noted. Continued high flow O2. Continued SCDs. Continued cardiac monitoring, SR 80s. Turned patient Q2 for half of this shift. Spouse then requested for patient to be supine. Spouse stated that patient was not comfortable with the wedge underneath. She stated that patient is more comfortable supine. Informed spouse that turning Q2 is beneficial for patient in preventing skin breakdown/pressure ulcer. Spouse stated that she understand, but stated that she wants patient supine while he is sleeping since he is more comfortable in that position.

## 2017-11-28 NOTE — PT/OT/SLP PROGRESS
Speech Language Pathology  Dysphagia Treatment    Sung Buckley   MRN: [id number]   Admitting Diagnosis: Closed subcapital fracture of right femur with routine healing    Diet recommendations: Solid Diet Level: Dental Soft, Finely chopped meat  Liquid Diet Level: Nectar Thick Feed only when awake/alert, No straws, HOB to 90 degrees, Small bites/sips, Alternating bites/sips, 1 bite/sip at a time, Check for pocketing/oral residue, Remain upright 30 minutes post meal, Double swallow with each bite/sip, Meds crushed in puree, Assistance with meals and Assistance with thickening liquids    SLP Treatment Date: 11/28/17  Speech Start Time: 1128     Speech Stop Time: 1139     Speech Total (min): 11 min       TREATMENT BILLABLE MINUTES:  Treatment Swallowing Dysfunction 11    Has the patient been evaluated by SLP for swallowing? : Yes  Keep patient NPO?: No   General Precautions: Standard, fall  Current Respiratory Status: nasal cannula       Subjective:  Pt found at edge of bed with PT and OT, as well as pt's spouse at bedside. Pt was awake and alert. Pt stayed at edge of bed during session today.        Objective:   Patient found with: arterial line, oxygen, telemetry  Pt participated in direct dysphagia tx this AM. Pt tolerated nectar thick liquids via cup x4, puree (pudding) textures x2, and dental soft textures (peaches) x3 with no overt s/s of aspiration. Pt was observed with throat clearing and a dry cough throughout session. However, pt demonstrated a clear voice s/p swallow all consistencies assessed. Pt continues to demonstrate slightly prolonged mastication and slight delay in AP transfer for dental soft textures.    Assessment:  Sung Buckley is a 72 y.o. male with a medical diagnosis of Closed subcapital fracture of right femur with routine healing and continues to present with mild oropharyngeal dysphagia. SLP recs: con't dental soft/finely chopped meats/thin liquids with recommended swallow precautions mentioned  above. SLP will f/u next date.      Discharge recommendations: Discharge Facility/Level Of Care Needs: nursing facility, skilled     Goals:    SLP Goals        Problem: SLP Goal    Goal Priority Disciplines Outcome   SLP Goal     SLP Ongoing (interventions implemented as appropriate)   Description:  SLP Short Term Goals:  1. Patient will successfully participate in ongoing clinical swallow evaluation and tolerate po trials with no overt s/s of aspiration. --MET 11/27  2. Pt will safely consume full liquid diet w/ NECTAR thickened liquids w/ no overt s/s of aspiration at supervision level.-discontinue   3. Pt will successfully participate in Modified Barium Swallow study to radiographically assess swallow function, rule out aspiration and determine safest/least restrictive diet.  -MET 11/27  4. Pt will tolerate dental soft/finely chopped meats/thin liquids with no overt s/s of aspiration.                     Plan:   Patient to be seen Therapy Frequency: 3 x/week   Plan of Care expires: 12/24/17  Plan of Care reviewed with: patient, spouse  SLP Follow-up?: Yes  SLP - Next Visit Date: 11/29/17            FAYE Dominguez, CF-SLP  Speech-Language Pathologist   11/28/2017

## 2017-11-28 NOTE — PLAN OF CARE
Problem: SLP Goal  Goal: SLP Goal  SLP Short Term Goals:  1. Patient will successfully participate in ongoing clinical swallow evaluation and tolerate po trials with no overt s/s of aspiration. --MET 11/27  2. Pt will safely consume full liquid diet w/ NECTAR thickened liquids w/ no overt s/s of aspiration at supervision level.-discontinue   3. Pt will successfully participate in Modified Barium Swallow study to radiographically assess swallow function, rule out aspiration and determine safest/least restrictive diet.  -MET 11/27  4. Pt will tolerate dental soft/finely chopped meats/thin liquids with no overt s/s of aspiration.  Outcome: Ongoing (interventions implemented as appropriate)  11/28: Pt participated in direct dysphagia tx this AM. Pt tolerated nectar thick liquids via cup x4, puree (pudding) textures x2, and dental soft textures (peaches) x3 with no overt s/s of aspiration. Pt was observed with throat clearing and a dry cough throughout session. However, pt demonstrated a clear voice s/p swallow all consistencies assessed. Pt continues to demonstrated slightly prolonged mastication and slight delay in AP transfer for dental soft textures. SLP recs: con't dental soft/finely chopped meats/thin liquids with recommended swallow precautions.  RADHA Dominguez., CF-SLP  Speech-Language Pathologist

## 2017-11-28 NOTE — SUBJECTIVE & OBJECTIVE
Interval History: Wife says that he is in a bad mood today. Did not want to eat any breakfast this AM. Took a step with PT yesterday.     Review of Systems   Unable to perform ROS: Dementia     Objective:     Vital Signs (Most Recent):  Temp: 97.2 °F (36.2 °C) (11/27/17 1600)  Pulse: 85 (11/27/17 1600)  Resp: 18 (11/27/17 1600)  BP: (!) 120/58 (11/27/17 1600)  SpO2: (!) 93 % (11/27/17 1558) Vital Signs (24h Range):  Temp:  [96.1 °F (35.6 °C)-100.8 °F (38.2 °C)] 97.2 °F (36.2 °C)  Pulse:  [] 85  Resp:  [18-20] 18  SpO2:  [92 %-95 %] 93 %  BP: (117-140)/(56-67) 120/58     Weight: 96.1 kg (211 lb 13.8 oz)  Body mass index is 29.55 kg/m².    Intake/Output Summary (Last 24 hours) at 11/27/17 1938  Last data filed at 11/27/17 1500   Gross per 24 hour   Intake                0 ml   Output             1653 ml   Net            -1653 ml      Physical Exam   Constitutional: He appears well-developed and well-nourished. No distress. Nasal cannula in place.   Cardiovascular: Normal rate and regular rhythm.    No murmur heard.  Pulmonary/Chest: Effort normal. No respiratory distress. He has no wheezes. He has rhonchi.   Abdominal: Soft. Bowel sounds are normal. He exhibits no distension. There is no tenderness.   Musculoskeletal: He exhibits no edema or tenderness.   Neurological: He is alert. He is disoriented.   Skin: Skin is warm and dry.   Nursing note and vitals reviewed.      Significant Labs:   CBC:   Recent Labs  Lab 11/27/17  0626   WBC 9.53   HGB 11.3*   HCT 32.9*        CMP:   Recent Labs  Lab 11/26/17  0624 11/27/17  0626    138   K 3.3* 3.2*    101   CO2 26 27   * 142*   BUN 17 15   CREATININE 1.0 0.9   CALCIUM 8.7 8.7   ANIONGAP 12 10   EGFRNONAA >60 >60     Magnesium:   Recent Labs  Lab 11/26/17  0624 11/27/17  0626   MG 1.9 1.9     POCT Glucose:   Recent Labs  Lab 11/26/17  0442 11/27/17  1256 11/27/17  1545   POCTGLUCOSE 145* 171* 140*       Significant Imaging: I have reviewed all  pertinent imaging results/findings within the past 24 hours.

## 2017-11-28 NOTE — ASSESSMENT & PLAN NOTE
S/p right hemiarthroplasty  Fall  Dr. Hallman performed hemiarthroplasty 11/23/17. Turn q2. Non-opiates for pain control. Docusate BID. Lovenox for DVT ppx. PT/OT. Will need SNF placement once respiratory status is improved.

## 2017-11-28 NOTE — PROGRESS NOTES
The Sw informed Thania lewis Ormond via [facility] the pt's not ready for d/c b/c he's still on high flow O2 per Dr. White.

## 2017-11-28 NOTE — PT/OT/SLP PROGRESS
Occupational Therapy  Treatment    Sung Buckley   MRN: [id number]   Admitting Diagnosis: Closed subcapital fracture of right femur with routine healing    OT Date of Treatment: 11/28/17   OT Start Time: 1110  OT Stop Time: 1149  OT Total Time (min): 39 min   Partial co-tx with PT/ST    Billable Minutes:  Self Care/Home Management 10 and Therapeutic Activity 15    General Precautions: Standard, fall  Orthopedic Precautions: RLE weight bearing as tolerated, RLE posterior precautions  Braces: N/A    Subjective:  Communicated with Haydee hobbs prior to session.    Pain/Comfort  Pain Rating 1:  (did not rate, but c/o pain with movement)  Location - Side 1: Right  Location - Orientation 1: generalized  Location 1:  (hip/leg)    Objective:  Patient found with: peripheral IV, oxygen, telemetry (Hi-Flow 20L, 45%)     Functional Mobility:  Bed Mobility:  Scooting/Bridging: Maximum Assistance  Supine to Sit: Maximum Assistance  Sit to Supine: Maximum Assistance, With leg lift    Transfers:   Sit <> Stand Assistance: Minimum Assistance (Min of 2, Mod of 1)  Sit <> Stand Assistive Device: Rolling Walker    Functional Ambulation: N/A    Activities of Daily Living:  Feeding Level of Assistance: Set-up Assistance    Balance:   Static Sit: GOOD-: Takes MODERATE challenges from all directions but inconsistently  Dynamic Sit: FAIR+: Maintains balance through MINIMAL excursions of active trunk motion  Static Stand: FAIR: Maintains without assist but unable to take challenges  Dynamic stand: 0: N/A    Therapeutic Activities and Exercises:  Bed mob as noted above. Sat EOB with SBA-CGA, initially 2* leaning slightly L to avoid putting full weight on R hip in sitting. Sit <> stand with RW, min (A) of 2 - patient unable to initiate steps or maintain standing longer than 20 seconds. Patient sat EOB for self-feeding task.     AM-PAC 6 CLICK ADL   How much help from another person does this patient currently need?   1 = Unable, Total/Dependent  "Assistance  2 = A lot, Maximum/Moderate Assistance  3 = A little, Minimum/Contact Guard/Supervision  4 = None, Modified [place]/Independent    Putting on and taking off regular lower body clothing? : 1  Bathing (including washing, rinsing, drying)?: 1  Toileting, which includes using toilet, bedpan, or urinal? : 1  Putting on and taking off regular upper body clothing?: 2  Taking care of personal grooming such as brushing teeth?: 2  Eating meals?: 3  Total Score: 10     AM-PAC Raw Score CMS "G-Code Modifier Level of Impairment Assistance   6 % Total / Unable   7 - 8 CM 80 - 100% Maximal Assist   9-13 CL 60 - 80% Moderate Assist   14 - 19 CK 40 - 60% Moderate Assist   20 - 22 CJ 20 - 40% Minimal Assist   23 CI 1-20% SBA / CGA   24 CH 0% Independent/ Mod I       Patient left HOB elevated with all lines intact, call button in reach, bed alarm on, RN notified and wife present    ASSESSMENT: Patient with improved bed mobility, but still limited during standing. Unable to initiate steps. Will benefit from skilled OT to address functional deficits.   Sung Buckley is a 72 y.o. male with a medical diagnosis of Closed subcapital fracture of right femur with routine healing     Rehab identified problem list/impairments: Rehab identified problem list/impairments: weakness, impaired endurance, impaired self care skills, impaired functional mobilty, gait instability, impaired balance, impaired cognition, decreased lower extremity function, decreased ROM, orthopedic precautions, pain, decreased safety awareness    Rehab potential is fair.    Activity tolerance: Fair    Discharge recommendations: Discharge Facility/Level Of Care Needs: nursing facility, skilled     Barriers to discharge: Barriers to Discharge: Decreased caregiver support    Equipment recommendations: none     GOALS:    Occupational Therapy Goals        Problem: Occupational Therapy Goal    Goal Priority Disciplines Outcome Interventions   Occupational " Therapy Goal     OT, PT/OT Ongoing (interventions implemented as appropriate)    Description:  Goals to be met by: 12/24     Patient will increase functional independence with ADLs by performing:    Feeding with Set-up Assistance. --MET 11/28  Grooming while seated with Minimal Assistance.  Sitting at edge of bed x10 minutes with Moderate Assistance. --MET 11/27  Rolling to Bilateral with Moderate Assistance.   Supine to sit with Maximum Assistance.  Toilet transfer to bedside commode with Maximum Assistance.                        Plan:  Patient to be seen 5 x/week to address the above listed problems via self-care/home management, therapeutic activities, therapeutic exercises  Plan of Care expires: 12/24/17  Plan of Care reviewed with: patient, spouse         MATTY Matamoros  11/28/2017

## 2017-11-28 NOTE — PLAN OF CARE
Problem: Patient Care Overview  Goal: Plan of Care Review  No distress noted    Comments: No distress noted

## 2017-11-28 NOTE — PROGRESS NOTES
[facility] Hospital Medicine  Progress Note    Patient Name: Sung Buckley  MRN: [id number]  Patient Class: IP- Inpatient   Admission Date: 11/22/2017  Length of Stay: 5 days  Attending Physician: Dimitrios Duke MD  Primary Care Provider: Maren Monterroso MD        Subjective:     Principal Problem:Closed subcapital fracture of right femur with routine healing    HPI:  Mr. uBckley is a 71 yo WM with mixed vascular and Alzheimer's dementia, HLD, and neuropathy that presented to [facility] for evaluation of right hip pain after a fall at home. His wife reports that she was cooking breakfast when she heard him calling out for her and complaining of pain. He sleeps in a hospital bed and the rails were up at the time. She found him on the floor and he says that he was trying to get up to go to the bathroom. She says that he normally calls for her assistance, but he did not this time. He has a walker and wheelchair that he uses to get around the house with and the wheelchair was beside his bed, but he was not using it at this time. He denies any loss of consciousness, light headedness, palpitations or chest pain prior to the fall. He complains of pain in the right hip that is constant, sharp, and 8/10 in intensity.     Hospital Course:  Required oxygen on admission and had fever overnight the first night prior to the OR. Had right hemiarthroplasty by Dr. Hallman on 11/23/17 and was monitored in the ICU post-operatively. He was started on zosyn for possible aspiration pneumonia. Respiratory status worsened and required comfort flow at 85% FiO2. CTA chest was negative for PTE, but did show multilobar opacities predominantly in the lower lobes that likely represent pneumonia vs pneumonitis. Vancomycin added to antibiotics as well as he continued to have recurrent fever. MBSS performed and dental soft diet with nectar thickened liquids recommended.     Interval History: Wife says that he is in a bad mood  today. Did not want to eat any breakfast this AM. Took a step with PT yesterday.     Review of Systems   Unable to perform ROS: Dementia     Objective:     Vital Signs (Most Recent):  Temp: 97.2 °F (36.2 °C) (11/27/17 1600)  Pulse: 85 (11/27/17 1600)  Resp: 18 (11/27/17 1600)  BP: (!) 120/58 (11/27/17 1600)  SpO2: (!) 93 % (11/27/17 1558) Vital Signs (24h Range):  Temp:  [96.1 °F (35.6 °C)-100.8 °F (38.2 °C)] 97.2 °F (36.2 °C)  Pulse:  [] 85  Resp:  [18-20] 18  SpO2:  [92 %-95 %] 93 %  BP: (117-140)/(56-67) 120/58     Weight: 96.1 kg (211 lb 13.8 oz)  Body mass index is 29.55 kg/m².    Intake/Output Summary (Last 24 hours) at 11/27/17 1938  Last data filed at 11/27/17 1500   Gross per 24 hour   Intake                0 ml   Output             1653 ml   Net            -1653 ml      Physical Exam   Constitutional: He appears well-developed and well-nourished. No distress. Nasal cannula in place.   Cardiovascular: Normal rate and regular rhythm.    No murmur heard.  Pulmonary/Chest: Effort normal. No respiratory distress. He has no wheezes. He has rhonchi.   Abdominal: Soft. Bowel sounds are normal. He exhibits no distension. There is no tenderness.   Musculoskeletal: He exhibits no edema or tenderness.   Neurological: He is alert. He is disoriented.   Skin: Skin is warm and dry.   Nursing note and vitals reviewed.      Significant Labs:   CBC:   Recent Labs  Lab 11/27/17 0626   WBC 9.53   HGB 11.3*   HCT 32.9*        CMP:   Recent Labs  Lab 11/26/17 0624 11/27/17 0626    138   K 3.3* 3.2*    101   CO2 26 27   * 142*   BUN 17 15   CREATININE 1.0 0.9   CALCIUM 8.7 8.7   ANIONGAP 12 10   EGFRNONAA >60 >60     Magnesium:   Recent Labs  Lab 11/26/17 0624 11/27/17  0626   MG 1.9 1.9     POCT Glucose:   Recent Labs  Lab 11/26/17  0442 11/27/17  1256 11/27/17  1545   POCTGLUCOSE 145* 171* 140*       Significant Imaging: I have reviewed all pertinent imaging results/findings within the past  24 hours.    Assessment/Plan:      * Closed subcapital fracture of right femur with routine healing    S/p right hemiarthroplasty  Fall  Dr. Hallman performed hemiarthroplasty 11/23/17. Turn q2. Non-opiates for pain control. Docusate BID. Lovenox for DVT ppx. PT/OT. Will need SNF placement once respiratory status is improved.         Aspiration pneumonia    Acute respiratory failure with hypoxia  CTA with multilobar infiltrates. Blood cultures are NGTD. Continue zosyn and vancomycin to cover aspiration pneumonia (D# 5/2). Wean oxygen as able. Echo with normal LVEF but with diastolic dysfunction.         Hypokalemia    Replace again today.         Hypophosphatemia    Continue neutraphos.         Seizure disorder    Continue keppra at 250 mg TID        Iron deficiency anemia secondary to inadequate dietary iron intake    Ferritin was 16 in 2016. Continue iron supplement.           Dysphagia    Appreciate SLP assistance. MBSS today. Dental soft diet with nectar thickened liquids.         Polyneuropathy    Gait disorder  Continue  folic acid and B12. PT/OT post-operatively. Hold gabapentin for now.         Depression with anxiety    Continue citalopram.           Mixed Alzheimer's and vascular dementia    Continue namenda and aricept. At high risk for delirium. Hold opiates for now.           Mixed hyperlipidemia    Continue rosuvastatin and gemfibrozil.             VTE Risk Mitigation         Ordered     enoxaparin injection 30 mg  Daily     Route:  Subcutaneous        11/23/17 1242     Place sequential compression device  Until discontinued      11/23/17 1242     High Risk of VTE  Once      11/23/17 1242              Dimitrios Duke MD  Department of Hospital Medicine   [facility]

## 2017-11-28 NOTE — ASSESSMENT & PLAN NOTE
Acute respiratory failure with hypoxia  CTA with multilobar infiltrates. Blood cultures are NGTD. Continue zosyn and vancomycin to cover aspiration pneumonia (D# 5/2). Wean oxygen as able. Echo with normal LVEF but with diastolic dysfunction.

## 2017-11-28 NOTE — PLAN OF CARE
Problem: Pressure Ulcer Risk (Jhonny Scale) (Adult,Obstetrics,Pediatric)  Intervention: Promote/Optimize Nutrition   11/28/17 1546   Nutrition Interventions   Oral Nutrition Promotion rest periods promoted     Intervention: Prevent/Manage Excess Moisture   11/28/17 1546   Hygiene Care   Perineal Care diaper changed;protective ointment applied   Skin Interventions   Skin Protection tubing/devices free from skin contact;adhesive use limited     Intervention: Maintain Head of Bed Elevation Less Than 30 Degrees as Tolerated   11/28/17 1546   Positioning   Head of Bed (HOB) HOB at 60-90 degrees       Goal: Identify Related Risk Factors and Signs and Symptoms  Related risk factors and signs and symptoms are identified upon initiation of Human Response Clinical Practice Guideline (CPG)   Outcome: Ongoing (interventions implemented as appropriate)   11/28/17 1546   Pressure Ulcer Risk (Jhonny Scale)   Related Risk Factors (Pressure Ulcer Risk (Jhonny Scale)) fluid intake inadequate     Goal: Skin Integrity  Patient will demonstrate the desired outcomes by discharge/transition of care.   Outcome: Ongoing (interventions implemented as appropriate)   11/28/17 1546   Pressure Ulcer Risk (Jhonny Scale) (Adult,Obstetrics,Pediatric)   Skin Integrity making progress toward outcome

## 2017-11-28 NOTE — ASSESSMENT & PLAN NOTE
Acute respiratory failure with hypoxia  CTA with multilobar infiltrates. Blood cultures are NGTD. Continue piperacillin-tazobactam and vancomycin to cover aspiration pneumonia (D# 6/3). Wean oxygen as able. Echo with normal LVEF but with diastolic dysfunction.

## 2017-11-28 NOTE — SUBJECTIVE & OBJECTIVE
Interval History: No acute events.    Review of Systems   Unable to perform ROS: Dementia     Objective:     Vital Signs (Most Recent):  Temp: 97.5 °F (36.4 °C) (11/28/17 1221)  Pulse: 83 (11/28/17 1221)  Resp: 18 (11/28/17 1221)  BP: (!) 140/66 (11/28/17 1221)  SpO2: (!) 93 % (11/28/17 1156) Vital Signs (24h Range):  Temp:  [97.1 °F (36.2 °C)-98.6 °F (37 °C)] 97.5 °F (36.4 °C)  Pulse:  [77-92] 83  Resp:  [18] 18  SpO2:  [93 %-95 %] 93 %  BP: (120-140)/(57-66) 140/66     Weight: 89.9 kg (198 lb 3.1 oz)  Body mass index is 27.64 kg/m².    Intake/Output Summary (Last 24 hours) at 11/28/17 1442  Last data filed at 11/28/17 0630   Gross per 24 hour   Intake                0 ml   Output             1683 ml   Net            -1683 ml      Physical Exam   Constitutional: He appears well-developed and well-nourished. No distress. Nasal cannula in place.   Pulmonary/Chest: Effort normal. No respiratory distress. He has rhonchi.   Musculoskeletal: He exhibits no edema or tenderness.   Neurological: He is alert. He is disoriented.   Nursing note and vitals reviewed.      Significant Labs:   CBC:     Recent Labs  Lab 11/27/17  0626   WBC 9.53   HGB 11.3*   HCT 32.9*        CMP:     Recent Labs  Lab 11/27/17  0626 11/28/17  0537    138   K 3.2* 3.3*    101   CO2 27 26   * 131*   BUN 15 19   CREATININE 0.9 0.9   CALCIUM 8.7 8.9   ANIONGAP 10 11   EGFRNONAA >60 >60     Magnesium:     Recent Labs  Lab 11/27/17  0626 11/28/17  0537   MG 1.9 2.1     POCT Glucose:     Recent Labs  Lab 11/27/17  1545 11/27/17  2042 11/28/17  0704   POCTGLUCOSE 140* 144* 141*       Significant Imaging: I have reviewed all pertinent imaging results/findings within the past 24 hours.  CT Lower Extremity Without Cont Right 11/22/17: There is a minimally displaced and somewhat impacted subcapital right femoral neck fracture.  Otherwise the right hip shows mild degenerative change.  Visualized intrapelvic structures and soft tissues  of the right thigh are unremarkable.  X-Ray Hip 2 or 3 views Right 11/23/17: Post right hip total arthroplasty with satisfactory alignment.  CTA Chest Non-Coronary 11/26/17: Routine CTA chest protocol performed with the administration of 100 cc of Omnipaque 350 IV contrast. No filling defects in the pulmonary arteries to suggest embolus. There is enlargement of the pulmonary arteries, suggesting some degree of pulmonary hypertension. No pericardial or pleural pleural effusion. There is a moderate amount of scattered groundglass opacification/ consolidation with interstitial thickening.. This is most prominent in the dependent portions of the lower lobes, but involves all lobes. The tracheobronchial tree is clear.Heart size within normal limits. No axillary or mediastinal lymphadenopathy. Thoracic aorta is normal caliber. No dissection. There is a moderate amount of scattered calcified atherosclerotic disease. Thyroid gland is grossly unremarkable.   No marrow replacement process.  Impression:  No pulmonary embolus identified.  Moderate scattered lung opacification, possible atelectasis with associated edema or pneumonitis. Short term imaging followup could be performed to ensure resolution.  2D echo with color flow doppler 11/27/17:     1 - Normal left ventricular systolic function (EF 55-60%).     2 - Impaired LV relaxation, normal LAP (grade 1 diastolic dysfunction).     3 - Normal right ventricular systolic function .     4 - The estimated PA systolic pressure is 38 mmHg.   Fl Modified Barium Swallow Speech 11/27/17:

## 2017-11-28 NOTE — PROGRESS NOTES
[facility] Hospital Medicine  Progress Note    Patient Name: Sung Buckley  MRN: [id number]  Patient Class: IP- Inpatient   Admission Date: 11/22/2017  Length of Stay: 6 days  Attending Physician: Jack White MD  Primary Care Provider: Maren Monterroso MD        Subjective:     Principal Problem:Closed subcapital fracture of right femur with routine healing    HPI:  Sung Buckley is a 72 y.o. white man with seizure disorder, hyperlipidemia, Alzheimer's and vascular dementia, dysphagia, neuropathy, and depression.  He lives in [place], [place].  He is .  His primary care physician is Dr. Maren Monterroso.  His neurologist is Dr. Bernardo Carrera.     He was taken to [facility] on 11/22/17 with right hip pain after a fall at home.  His wife reported that she was cooking breakfast when she heard him calling out for her and complaining of pain.  He sleeps in a hospital bed and the rails were up at the time.  She found him on the floor and he said that he was trying to get up to go to the bathroom.  She said that he normally calls for her assistance, but he did not this time.  He has a walker and wheelchair that he uses to get around the house with and the wheelchair was beside his bed, but he was not using it at this time.  He denied any loss of consciousness, light headedness, palpitations or chest pain prior to the fall.  He complained of pain in the right hip that is constant, sharp, and 8/10 in intensity.     Hospital Course:  He required supplemental oxygen on admission and had fever overnight the first night prior to going to the operating room.  He had right hemiarthroplasty by Dr. Abimael Hallman on 11/23/17 and was monitored in the ICU postoperatively.  He was started on piperacillin-tazobactam for possible aspiration pneumonia.  Respiratory status worsened and required comfort flow at 85% FiO2.  CTA chest was negative for pulmonary embolism, but did show  multilobar opacities predominantly in the lower lobes that likely represent pneumonia vs pneumonitis.  Vancomycin was added as he continued to have recurrent fever.  Modified barium swallow study was performed and dental soft diet with nectar thickened liquids was recommended.     Interval History: No acute events.    Review of Systems   Unable to perform ROS: Dementia     Objective:     Vital Signs (Most Recent):  Temp: 97.5 °F (36.4 °C) (11/28/17 1221)  Pulse: 83 (11/28/17 1221)  Resp: 18 (11/28/17 1221)  BP: (!) 140/66 (11/28/17 1221)  SpO2: (!) 93 % (11/28/17 1156) Vital Signs (24h Range):  Temp:  [97.1 °F (36.2 °C)-98.6 °F (37 °C)] 97.5 °F (36.4 °C)  Pulse:  [77-92] 83  Resp:  [18] 18  SpO2:  [93 %-95 %] 93 %  BP: (120-140)/(57-66) 140/66     Weight: 89.9 kg (198 lb 3.1 oz)  Body mass index is 27.64 kg/m².    Intake/Output Summary (Last 24 hours) at 11/28/17 1442  Last data filed at 11/28/17 0630   Gross per 24 hour   Intake                0 ml   Output             1683 ml   Net            -1683 ml      Physical Exam   Constitutional: He appears well-developed and well-nourished. No distress. Nasal cannula in place.   Pulmonary/Chest: Effort normal. No respiratory distress. He has rhonchi.   Musculoskeletal: He exhibits no edema or tenderness.   Neurological: He is alert. He is disoriented.   Nursing note and vitals reviewed.      Significant Labs:   CBC:     Recent Labs  Lab 11/27/17  0626   WBC 9.53   HGB 11.3*   HCT 32.9*        CMP:     Recent Labs  Lab 11/27/17 0626 11/28/17  0537    138   K 3.2* 3.3*    101   CO2 27 26   * 131*   BUN 15 19   CREATININE 0.9 0.9   CALCIUM 8.7 8.9   ANIONGAP 10 11   EGFRNONAA >60 >60     Magnesium:     Recent Labs  Lab 11/27/17 0626 11/28/17  0537   MG 1.9 2.1     POCT Glucose:     Recent Labs  Lab 11/27/17  1545 11/27/17  2042 11/28/17  0704   POCTGLUCOSE 140* 144* 141*       Significant Imaging: I have reviewed all pertinent imaging  results/findings within the past 24 hours.  CT Lower Extremity Without Cont Right 11/22/17: There is a minimally displaced and somewhat impacted subcapital right femoral neck fracture.  Otherwise the right hip shows mild degenerative change.  Visualized intrapelvic structures and soft tissues of the right thigh are unremarkable.  X-Ray Hip 2 or 3 views Right 11/23/17: Post right hip total arthroplasty with satisfactory alignment.  CTA Chest Non-Coronary 11/26/17: Routine CTA chest protocol performed with the administration of 100 cc of Omnipaque 350 IV contrast. No filling defects in the pulmonary arteries to suggest embolus. There is enlargement of the pulmonary arteries, suggesting some degree of pulmonary hypertension. No pericardial or pleural pleural effusion. There is a moderate amount of scattered groundglass opacification/ consolidation with interstitial thickening.. This is most prominent in the dependent portions of the lower lobes, but involves all lobes. The tracheobronchial tree is clear.Heart size within normal limits. No axillary or mediastinal lymphadenopathy. Thoracic aorta is normal caliber. No dissection. There is a moderate amount of scattered calcified atherosclerotic disease. Thyroid gland is grossly unremarkable.   No marrow replacement process.  Impression:  No pulmonary embolus identified.  Moderate scattered lung opacification, possible atelectasis with associated edema or pneumonitis. Short term imaging followup could be performed to ensure resolution.  2D echo with color flow doppler 11/27/17:     1 - Normal left ventricular systolic function (EF 55-60%).     2 - Impaired LV relaxation, normal LAP (grade 1 diastolic dysfunction).     3 - Normal right ventricular systolic function .     4 - The estimated PA systolic pressure is 38 mmHg.   Fl Modified Barium Swallow Speech 11/27/17:     Assessment/Plan:      * Closed subcapital fracture of right femur with routine healing    S/p right hip  hemiarthroplasty  Fall  Dr. Hallman performed hemiarthroplasty 11/23/17. Turn q2. Non-opiates for pain control. Docusate BID. Lovenox for DVT ppx. PT/OT. Will need SNF placement once respiratory status is improved.         Hypokalemia    Replace again today.         Hypophosphatemia    Continue neutraphos.         Aspiration pneumonia    Acute respiratory failure with hypoxia  CTA with multilobar infiltrates. Blood cultures are NGTD. Continue piperacillin-tazobactam and vancomycin to cover aspiration pneumonia (D# 6/3). Wean oxygen as able. Echo with normal LVEF but with diastolic dysfunction.         Seizure disorder    Continue levetiracetam at 250 mg TID        Iron deficiency anemia secondary to inadequate dietary iron intake    Ferritin was 16 in 2016. Continue iron supplement.           Oropharyngeal dysphagia    Appreciate SLP assistance.  Dental soft diet with nectar thickened liquids.         Polyneuropathy    Gait disorder  Continue  folic acid and B12. PT/OT post-operatively. Hold gabapentin for now.         Depression with anxiety    Continue citalopram.           Mixed Alzheimer's and vascular dementia    Continue donepezil and memantine. At high risk for delirium. Hold opiates for now.           Mixed hyperlipidemia    Continue rosuvastatin and gemfibrozil.             VTE Risk Mitigation         Ordered     enoxaparin injection 30 mg  Daily     Route:  Subcutaneous        11/23/17 1242     Place sequential compression device  Until discontinued      11/23/17 1242     High Risk of VTE  Once      11/23/17 1242              Jack White MD  Department of Hospital Medicine   [facility]

## 2017-11-28 NOTE — PLAN OF CARE
Problem: Occupational Therapy Goal  Goal: Occupational Therapy Goal  Goals to be met by: 12/24     Patient will increase functional independence with ADLs by performing:    Feeding with Set-up Assistance. --MET 11/28  Grooming while seated with Minimal Assistance.  Sitting at edge of bed x10 minutes with Moderate Assistance. --MET 11/27  Rolling to Bilateral with Moderate Assistance.   Supine to sit with Maximum Assistance.  Toilet transfer to bedside commode with Maximum Assistance.      Outcome: Ongoing (interventions implemented as appropriate)  Patient with improved bed mobility, but still limited during standing. Unable to initiate steps. Will benefit from skilled OT to address functional deficits.

## 2017-11-28 NOTE — PLAN OF CARE
Problem: Physical Therapy Goal  Goal: Physical Therapy Goal  Goals to be met by: 17     Patient will increase functional independence with mobility by performin. Supine <> sit with Maximum Assistance  2. Sit to stand transfer with Moderate Assistance  3. Bed to chair transfer with Moderate Assistance using Rolling Walker  4. Gait  x 5 feet with Minimal Assistance using Rolling Walker.   5. Sitting at edge of bed x 15 minutes with Stand-by Assistance  6. Lower extremity exercise program x 10 reps per handout, with assistance as needed     Outcome: Ongoing (interventions implemented as appropriate)  Slowly improving functional mobility min assist with scooting EOB to head of bed using BLE to complete task

## 2017-11-28 NOTE — ASSESSMENT & PLAN NOTE
S/p right hip hemiarthroplasty  Fall  Dr. Hallman performed hemiarthroplasty 11/23/17. Turn q2. Non-opiates for pain control. Docusate BID. Lovenox for DVT ppx. PT/OT. Will need SNF placement once respiratory status is improved.

## 2017-11-28 NOTE — PT/OT/SLP PROGRESS
Physical Therapy  Treatment    Sung Buckley   MRN: [id number]   Admitting Diagnosis: Closed subcapital fracture of right femur with routine healing    PT Received On: 11/28/17  PT Start Time: 1111     PT Stop Time: 1132    PT Total Time (min): 21 min       Billable Minutes:  Therapeutic Activity 15    Treatment Type: Treatment  PT/PTA: PT     PTA Visit Number: 1       General Precautions: Standard, fall  Orthopedic Precautions: RLE weight bearing as tolerated, RLE posterior precautions   Braces: N/A    Do you have any cultural, spiritual, Sikhism conflicts, given your current situation?: none reported to PT    Subjective:  Communicated with primary nurse prior to session.      Pain/Comfort  Pain Rating 1: 0/10  Pain Rating Post-Intervention 1: 0/10    Objective:   Patient found with: oxygen, telemetry    Functional Mobility:  Bed Mobility:   Scooting/Bridging: Moderate Assistance  Supine to Sit: Moderate Assistance    Transfers:  Sit <> Stand Assistance: Minimum Assistance  Sit <> Stand Assistive Device: Rolling Walker    Gait:   Gait Distance: unable to take steps    Stairs:  na    Balance:   Static Sit: FAIR+: Able to take MINIMAL challenges from all directions  Dynamic Sit: FAIR: Cannot move trunk without losing balance  Static Stand: FAIR: Maintains without assist but unable to take challenges  Dynamic stand: POOR: N/A     Therapeutic Activities and Exercises:  Able to come from sit to stand with mod assistance and RW, min assist with scooting laterally on EOB with use of BLE     AM-PAC 6 CLICK MOBILITY  How much help from another person does this patient currently need?   1 = Unable, Total/Dependent Assistance  2 = A lot, Maximum/Moderate Assistance  3 = A little, Minimum/Contact Guard/Supervision  4 = None, Modified Young/Independent    Turning over in bed (including adjusting bedclothes, sheets and blankets)?: 2  Sitting down on and standing up from a chair with arms (e.g., wheelchair, bedside commode,  etc.): 3  Moving from lying on back to sitting on the side of the bed?: 2  Moving to and from a bed to a chair (including a wheelchair)?: 2  Need to walk in hospital room?: 1  Climbing 3-5 steps with a railing?: 1  Total Score: 11    AM-PAC Raw Score CMS G-Code Modifier Level of Impairment Assistance   6 % Total / Unable   7 - 9 CM 80 - 100% Maximal Assist   10 - 14 CL 60 - 80% Moderate Assist   15 - 19 CK 40 - 60% Moderate Assist   20 - 22 CJ 20 - 40% Minimal Assist   23 CI 1-20% SBA / CGA   24 CH 0% Independent/ Mod I     Patient left seated EOB with all lines intact, call button in reach and BARRETT, speech therapist and spouse present.    Assessment:  Sung Buckley is a 72 y.o. male with a medical diagnosis of Closed subcapital fracture of right femur with routine healing and presents with slowly improving functional mobility Pain appears to be limiting factor.    Rehab identified problem list/impairments: Rehab identified problem list/impairments: weakness, gait instability, impaired self care skills, impaired functional mobilty, impaired cognition, decreased ROM, decreased lower extremity function, edema, pain, impaired endurance    Rehab potential is fair.    Activity tolerance: Fair    Discharge recommendations: Discharge Facility/Level Of Care Needs: nursing facility, skilled     Barriers to discharge: Barriers to Discharge: Decreased caregiver support    Equipment recommendations: Equipment Needed After Discharge: none     GOALS:    Physical Therapy Goals        Problem: Physical Therapy Goal    Goal Priority Disciplines Outcome Goal Variances Interventions   Physical Therapy Goal     PT/OT, PT Ongoing (interventions implemented as appropriate)     Description:  Goals to be met by: 17     Patient will increase functional independence with mobility by performin. Supine <> sit with Maximum Assistance  2. Sit to stand transfer with Moderate Assistance  3. Bed to chair transfer with Moderate  Assistance using Rolling Walker  4. Gait  x 5 feet with Minimal Assistance using Rolling Walker.   5. Sitting at edge of bed x 15 minutes with Stand-by Assistance  6. Lower extremity exercise program x 10 reps per handout, with assistance as needed             Problem: Physical Therapy Goal    Goal Priority Disciplines Outcome Goal Variances Interventions   Physical Therapy Goal     PT/OT, PT                      PLAN:    Patient to be seen daily  to address the above listed problems via gait training, therapeutic activities, therapeutic exercises  Plan of Care expires: 12/24/17  Plan of Care reviewed with: patient, spouse         José Partida, PT  11/28/2017

## 2017-11-29 PROBLEM — E87.6 HYPOKALEMIA: Status: RESOLVED | Noted: 2017-11-26 | Resolved: 2017-11-29

## 2017-11-29 PROBLEM — E83.39 HYPOPHOSPHATEMIA: Status: RESOLVED | Noted: 2017-11-25 | Resolved: 2017-11-29

## 2017-11-29 PROBLEM — R40.0 SOMNOLENCE: Status: ACTIVE | Noted: 2017-11-29

## 2017-11-29 LAB
ALLENS TEST: ABNORMAL
ANION GAP SERPL CALC-SCNC: 8 MMOL/L
BUN SERPL-MCNC: 18 MG/DL
CALCIUM SERPL-MCNC: 8.9 MG/DL
CHLORIDE SERPL-SCNC: 104 MMOL/L
CO2 SERPL-SCNC: 27 MMOL/L
CREAT SERPL-MCNC: 0.9 MG/DL
DELSYS: ABNORMAL
ERYTHROCYTE [DISTWIDTH] IN BLOOD BY AUTOMATED COUNT: 15 %
EST. GFR  (AFRICAN AMERICAN): >60 ML/MIN/1.73 M^2
EST. GFR  (NON AFRICAN AMERICAN): >60 ML/MIN/1.73 M^2
FIO2: 40
FLOW: 15
GLUCOSE SERPL-MCNC: 142 MG/DL
HCO3 UR-SCNC: 26 MMOL/L (ref 24–28)
HCT VFR BLD AUTO: 33.5 %
HCT VFR BLD CALC: 33 %PCV (ref 36–54)
HGB BLD-MCNC: 11 G/DL
HGB BLD-MCNC: 11.2 G/DL
MAGNESIUM SERPL-MCNC: 1.9 MG/DL
MCH RBC QN AUTO: 28.7 PG
MCHC RBC AUTO-ENTMCNC: 33.4 G/DL
MCV RBC AUTO: 86 FL
MODE: ABNORMAL
PCO2 BLDA: 33.2 MMHG (ref 35–45)
PH SMN: 7.5 [PH] (ref 7.35–7.45)
PHOSPHATE SERPL-MCNC: 2.7 MG/DL
PLATELET # BLD AUTO: 337 K/UL
PMV BLD AUTO: 9.1 FL
PO2 BLDA: 57 MMHG (ref 80–100)
POC BE: 3 MMOL/L
POC IONIZED CALCIUM: 0.97 MMOL/L (ref 1.06–1.42)
POC SATURATED O2: 92 % (ref 95–100)
POC TCO2: 27 MMOL/L (ref 23–27)
POTASSIUM BLD-SCNC: 3.4 MMOL/L (ref 3.5–5.1)
POTASSIUM SERPL-SCNC: 3.6 MMOL/L
RBC # BLD AUTO: 3.9 M/UL
SAMPLE: ABNORMAL
SITE: ABNORMAL
SODIUM BLD-SCNC: 137 MMOL/L (ref 136–145)
SODIUM SERPL-SCNC: 139 MMOL/L
WBC # BLD AUTO: 10.47 K/UL

## 2017-11-29 PROCEDURE — 83735 ASSAY OF MAGNESIUM: CPT

## 2017-11-29 PROCEDURE — 36600 WITHDRAWAL OF ARTERIAL BLOOD: CPT

## 2017-11-29 PROCEDURE — 63600175 PHARM REV CODE 636 W HCPCS: Performed by: NURSE PRACTITIONER

## 2017-11-29 PROCEDURE — 27100171 HC OXYGEN HIGH FLOW UP TO 24 HOURS

## 2017-11-29 PROCEDURE — 97530 THERAPEUTIC ACTIVITIES: CPT

## 2017-11-29 PROCEDURE — 84100 ASSAY OF PHOSPHORUS: CPT

## 2017-11-29 PROCEDURE — 97535 SELF CARE MNGMENT TRAINING: CPT

## 2017-11-29 PROCEDURE — 85027 COMPLETE CBC AUTOMATED: CPT

## 2017-11-29 PROCEDURE — 25000003 PHARM REV CODE 250: Performed by: HOSPITALIST

## 2017-11-29 PROCEDURE — 36415 COLL VENOUS BLD VENIPUNCTURE: CPT

## 2017-11-29 PROCEDURE — 80048 BASIC METABOLIC PNL TOTAL CA: CPT

## 2017-11-29 PROCEDURE — 11000001 HC ACUTE MED/SURG PRIVATE ROOM

## 2017-11-29 PROCEDURE — 82803 BLOOD GASES ANY COMBINATION: CPT

## 2017-11-29 PROCEDURE — 63600175 PHARM REV CODE 636 W HCPCS: Performed by: HOSPITALIST

## 2017-11-29 PROCEDURE — 63600175 PHARM REV CODE 636 W HCPCS

## 2017-11-29 PROCEDURE — 25000003 PHARM REV CODE 250: Performed by: NURSE PRACTITIONER

## 2017-11-29 PROCEDURE — 94761 N-INVAS EAR/PLS OXIMETRY MLT: CPT

## 2017-11-29 RX ORDER — KETOROLAC TROMETHAMINE 30 MG/ML
15 INJECTION, SOLUTION INTRAMUSCULAR; INTRAVENOUS ONCE
Status: COMPLETED | OUTPATIENT
Start: 2017-11-29 | End: 2017-11-29

## 2017-11-29 RX ADMIN — LEVOTHYROXINE SODIUM 25 MCG: 25 TABLET ORAL at 05:11

## 2017-11-29 RX ADMIN — PIPERACILLIN AND TAZOBACTAM 4.5 G: 4; .5 INJECTION, POWDER, LYOPHILIZED, FOR SOLUTION INTRAVENOUS; PARENTERAL at 08:11

## 2017-11-29 RX ADMIN — FOLIC ACID 1 MG: 1 TABLET ORAL at 08:11

## 2017-11-29 RX ADMIN — POTASSIUM & SODIUM PHOSPHATES POWDER PACK 280-160-250 MG 2 PACKET: 280-160-250 PACK at 08:11

## 2017-11-29 RX ADMIN — NEOMYCIN SULFATE, POLYMYXIN B SULFATE AND GRAMICIDIN 1 DROP: 1.75; 10000; .025 SOLUTION/ DROPS OPHTHALMIC at 12:11

## 2017-11-29 RX ADMIN — VANCOMYCIN HYDROCHLORIDE 1000 MG: 1 INJECTION, POWDER, LYOPHILIZED, FOR SOLUTION INTRAVENOUS at 08:11

## 2017-11-29 RX ADMIN — DONEPEZIL HYDROCHLORIDE 10 MG: 5 TABLET, FILM COATED ORAL at 08:11

## 2017-11-29 RX ADMIN — KETOROLAC TROMETHAMINE 15 MG: 30 INJECTION, SOLUTION INTRAMUSCULAR at 10:11

## 2017-11-29 RX ADMIN — DOCUSATE SODIUM 100 MG: 100 CAPSULE, LIQUID FILLED ORAL at 08:11

## 2017-11-29 RX ADMIN — FERROUS SULFATE TAB EC 325 MG (65 MG FE EQUIVALENT) 325 MG: 325 (65 FE) TABLET DELAYED RESPONSE at 12:11

## 2017-11-29 RX ADMIN — CITALOPRAM HYDROBROMIDE 20 MG: 20 TABLET ORAL at 05:11

## 2017-11-29 RX ADMIN — LEVETIRACETAM 250 MG: 250 TABLET, FILM COATED ORAL at 05:11

## 2017-11-29 RX ADMIN — ENOXAPARIN SODIUM 30 MG: 100 INJECTION SUBCUTANEOUS at 08:11

## 2017-11-29 RX ADMIN — MEMANTINE HYDROCHLORIDE 10 MG: 5 TABLET ORAL at 08:11

## 2017-11-29 RX ADMIN — FERROUS SULFATE TAB EC 325 MG (65 MG FE EQUIVALENT) 325 MG: 325 (65 FE) TABLET DELAYED RESPONSE at 05:11

## 2017-11-29 RX ADMIN — ROSUVASTATIN CALCIUM 10 MG: 10 TABLET, FILM COATED ORAL at 05:11

## 2017-11-29 RX ADMIN — PIPERACILLIN AND TAZOBACTAM 4.5 G: 4; .5 INJECTION, POWDER, LYOPHILIZED, FOR SOLUTION INTRAVENOUS; PARENTERAL at 10:11

## 2017-11-29 RX ADMIN — NEOMYCIN SULFATE, POLYMYXIN B SULFATE AND GRAMICIDIN 1 DROP: 1.75; 10000; .025 SOLUTION/ DROPS OPHTHALMIC at 06:11

## 2017-11-29 RX ADMIN — NEOMYCIN SULFATE, POLYMYXIN B SULFATE AND GRAMICIDIN 1 DROP: 1.75; 10000; .025 SOLUTION/ DROPS OPHTHALMIC at 05:11

## 2017-11-29 RX ADMIN — LEVETIRACETAM 250 MG: 250 TABLET, FILM COATED ORAL at 02:11

## 2017-11-29 RX ADMIN — POTASSIUM & SODIUM PHOSPHATES POWDER PACK 280-160-250 MG 2 PACKET: 280-160-250 PACK at 05:11

## 2017-11-29 RX ADMIN — GEMFIBROZIL 600 MG: 600 TABLET ORAL at 08:11

## 2017-11-29 RX ADMIN — PIPERACILLIN AND TAZOBACTAM 4.5 G: 4; .5 INJECTION, POWDER, LYOPHILIZED, FOR SOLUTION INTRAVENOUS; PARENTERAL at 05:11

## 2017-11-29 RX ADMIN — LEVETIRACETAM 250 MG: 250 TABLET, FILM COATED ORAL at 09:11

## 2017-11-29 RX ADMIN — FERROUS SULFATE TAB EC 325 MG (65 MG FE EQUIVALENT) 325 MG: 325 (65 FE) TABLET DELAYED RESPONSE at 08:11

## 2017-11-29 RX ADMIN — PIPERACILLIN AND TAZOBACTAM 4.5 G: 4; .5 INJECTION, POWDER, LYOPHILIZED, FOR SOLUTION INTRAVENOUS; PARENTERAL at 01:11

## 2017-11-29 RX ADMIN — PANTOPRAZOLE SODIUM 40 MG: 40 TABLET, DELAYED RELEASE ORAL at 08:11

## 2017-11-29 RX ADMIN — POTASSIUM & SODIUM PHOSPHATES POWDER PACK 280-160-250 MG 2 PACKET: 280-160-250 PACK at 12:11

## 2017-11-29 NOTE — PLAN OF CARE
TN rounded on pt - met with pt and wife Ivis   pmh Alzheimer's Dx   11/23  s/ p R hip hemiarthroplast due to fall  pt for SNF plcm pending improvement of Resp status   SW [place] facilitating plcmt:    1  [facility], 2 [facility], 3.  [place] 4.  [place]       wife Ivis p #  [phone]    pt lives at home with wife - had [facility] until last wk   pt has :  bsc, ttb, walker, [facility] hosp bed            11/29/17 8305   Discharge Reassessment   Assessment Type Discharge Planning Reassessment   Provided patient/caregiver education on the expected discharge date and the discharge plan Yes   Do you have any problems affording any of your prescribed medications? TBD   Discharge Plan A Skilled Nursing Facility

## 2017-11-29 NOTE — ASSESSMENT & PLAN NOTE
Acute respiratory failure with hypoxia  CTA with multilobar infiltrates.  Blood cultures are NGTD.  Continue piperacillin-tazobactam and vancomycin to cover aspiration pneumonia (D# 7/4). Wean oxygen as able.  Echo with normal LVEF but with diastolic dysfunction.

## 2017-11-29 NOTE — PLAN OF CARE
Problem: Physical Therapy Goal  Goal: Physical Therapy Goal  Goals to be met by: 17     Patient will increase functional independence with mobility by performin. Supine <> sit with Maximum Assistance  2. Sit to stand transfer with Moderate Assistance  3. Bed to chair transfer with Moderate Assistance using Rolling Walker  4. Gait  x 5 feet with Minimal Assistance using Rolling Walker.   5. Sitting at edge of bed x 15 minutes with Stand-by Assistance  6. Lower extremity exercise program x 10 reps per handout, with assistance as needed     Outcome: Ongoing (interventions implemented as appropriate)  Pt required increased assistance during today treatment session.

## 2017-11-29 NOTE — PROGRESS NOTES
[facility] Hospital Medicine  Progress Note    Patient Name: Sung Buckley  MRN: [id number]  Patient Class: IP- Inpatient   Admission Date: 11/22/2017  Length of Stay: 7 days  Attending Physician: Jack White MD  Primary Care Provider: Maren Monterroso MD        Subjective:     Principal Problem:Closed subcapital fracture of right femur with routine healing    HPI:  Sung Buckley is a 72 y.o. white man with seizure disorder, hyperlipidemia, Alzheimer's and vascular dementia, dysphagia, neuropathy, and depression.  He lives in [place], [place].  He is .  His primary care physician is Dr. Maren Monterroso.  His neurologist is Dr. Bernardo Carrera.     He was taken to [facility] on 11/22/17 with right hip pain after a fall at home.  His wife reported that she was cooking breakfast when she heard him calling out for her and complaining of pain.  He sleeps in a hospital bed and the rails were up at the time.  She found him on the floor and he said that he was trying to get up to go to the bathroom.  She said that he normally calls for her assistance, but he did not this time.  He has a walker and wheelchair that he uses to get around the house with and the wheelchair was beside his bed, but he was not using it at this time.  He denied any loss of consciousness, light headedness, palpitations or chest pain prior to the fall.  He complained of pain in the right hip that is constant, sharp, and 8/10 in intensity.     Hospital Course:  He required supplemental oxygen on admission and had fever overnight the first night prior to going to the operating room.  He had right hemiarthroplasty by Dr. Abimael Hallman on 11/23/17 and was monitored in the ICU postoperatively.  He was started on piperacillin-tazobactam for possible aspiration pneumonia.  Respiratory status worsened and required comfort flow at 85% FiO2.  CTA chest was negative for pulmonary embolism, but did show  multilobar opacities predominantly in the lower lobes that likely represent pneumonia vs pneumonitis.  Vancomycin was added as he continued to have recurrent fever.  Modified barium swallow study was performed and dental soft diet with nectar thickened liquids was recommended.  His wife noticed him to be increasingly lethargic.    Interval History: Worsening lethargy, slept all day yesterday and this morning.    Review of Systems   Unable to perform ROS: Dementia     Objective:     Vital Signs (Most Recent):  Temp: (!) 100.6 °F (38.1 °C) (11/29/17 0753)  Pulse: 84 (11/29/17 0753)  Resp: 18 (11/29/17 0753)  BP: (!) 120/59 (11/29/17 0753)  SpO2: (!) 93 % (11/29/17 0932) Vital Signs (24h Range):  Temp:  [97.5 °F (36.4 °C)-100.8 °F (38.2 °C)] 100.6 °F (38.1 °C)  Pulse:  [77-91] 84  Resp:  [18-19] 18  SpO2:  [92 %-93 %] 93 %  BP: (120-148)/(59-66) 120/59     Weight: 89.9 kg (198 lb 3.1 oz)  Body mass index is 27.64 kg/m².    Intake/Output Summary (Last 24 hours) at 11/29/17 1026  Last data filed at 11/29/17 0149   Gross per 24 hour   Intake              350 ml   Output             1081 ml   Net             -731 ml      Physical Exam   Constitutional: He appears well-developed and well-nourished. He appears lethargic. No distress. Nasal cannula in place.   Pulmonary/Chest: Effort normal. No respiratory distress. He has rhonchi.   Musculoskeletal: He exhibits no edema or tenderness.   Neurological: He appears lethargic. He is disoriented.   Can lift both upper extremities   Nursing note and vitals reviewed.      Significant Labs:   CBC:     Recent Labs  Lab 11/29/17  0719   WBC 10.47   HGB 11.2*   HCT 33.5*        CMP:     Recent Labs  Lab 11/28/17  0537 11/29/17  0719    139   K 3.3* 3.6    104   CO2 26 27   * 142*   BUN 19 18   CREATININE 0.9 0.9   CALCIUM 8.9 8.9   ANIONGAP 11 8   EGFRNONAA >60 >60     Magnesium:     Recent Labs  Lab 11/28/17  0537 11/29/17  0719   MG 2.1 1.9     POCT Glucose:      Recent Labs  Lab 11/27/17  1545 11/27/17  2042 11/28/17  0704   POCTGLUCOSE 140* 144* 141*       Significant Imaging: I have reviewed all pertinent imaging results/findings within the past 24 hours.  Fl Modified Barium Swallow Speech 11/27/17:   Impressions  Pt presents with mild oropharyngeal dysphagia c/b above mentioned deficits. Pt is at a mild-moderate risk for aspiration 2/2 pt's reduced pharyngeal clearance s/p swallow. (see above details)  Prognosis/Plan/Education  Pt's prognosis is fair given pt's diagnoses/co-morbidities and family support.   SLP educated pt and pt's wife on MBSS results and diet recs/swallow precautions. SLP also provided skilled education, via visual demonstration, to pt and pt's wife on how to appropriately thicken all liquids to nectar thick liquids. Pt's wife acknowledged and confirmed understanding, via teach back to SLP. SLP also educated MITCH [facility] and [facility] on pt's MBSS results and diet recs/swallow precautions.    Assessment/Plan:      * Closed subcapital fracture of right femur with routine healing    S/p right hip hemiarthroplasty  Fall  Dr. Hallman performed hemiarthroplasty 11/23/17. Turn q2. Non-opiates for pain control. Docusate BID. Lovenox for DVT ppx. PT/OT. Will need SNF placement once respiratory status is improved.         Somnolence    Not on any new sedating medications.  Check ABG to rule out hypercapnia.          Aspiration pneumonia of both lungs    Acute respiratory failure with hypoxia  CTA with multilobar infiltrates.  Blood cultures are NGTD.  Continue piperacillin-tazobactam and vancomycin to cover aspiration pneumonia (D# 7/4). Wean oxygen as able.  Echo with normal LVEF but with diastolic dysfunction.         Seizure disorder    Continue levetiracetam at 250 mg TID        Iron deficiency anemia secondary to inadequate dietary iron intake    Ferritin was 16 in 2016. Continue iron supplement.           Oropharyngeal dysphagia    Appreciate SLP  assistance.  Dental soft diet with nectar thickened liquids.         Polyneuropathy    Gait disorder  Continue  folic acid and B12. PT/OT post-operatively. Hold gabapentin for now.         Depression with anxiety    Continue citalopram.           Mixed Alzheimer's and vascular dementia    Continue donepezil and memantine. At high risk for delirium. Hold opiates for now.           Mixed hyperlipidemia    Continue rosuvastatin and gemfibrozil.             VTE Risk Mitigation         Ordered     enoxaparin injection 30 mg  Daily     Route:  Subcutaneous        11/23/17 1242     Place sequential compression device  Until discontinued      11/23/17 1242     High Risk of VTE  Once      11/23/17 1242              Jack White MD  Department of Hospital Medicine   [facility]

## 2017-11-29 NOTE — PLAN OF CARE
Problem: Occupational Therapy Goal  Goal: Occupational Therapy Goal  Goals to be met by: 12/24     Patient will increase functional independence with ADLs by performing:    Feeding with Set-up Assistance. --MET 11/28  Grooming while seated with Minimal Assistance.  Sitting at edge of bed x10 minutes with Moderate Assistance. --MET 11/27  Rolling to Bilateral with Moderate Assistance.   Supine to sit with Maximum Assistance.  Toilet transfer to bedside commode with Maximum Assistance.       Outcome: Ongoing (interventions implemented as appropriate)  Patient required total (A) for all mobility this date. Extremely somnolent and required max cues to remain alert and keep eyes open. Patient requires 24/7 (A) and care. Will benefit from skilled OT to address functional deficits.

## 2017-11-29 NOTE — SUBJECTIVE & OBJECTIVE
Interval History: Worsening lethargy, slept all day yesterday and this morning.    Review of Systems   Unable to perform ROS: Dementia     Objective:     Vital Signs (Most Recent):  Temp: (!) 100.6 °F (38.1 °C) (11/29/17 0753)  Pulse: 84 (11/29/17 0753)  Resp: 18 (11/29/17 0753)  BP: (!) 120/59 (11/29/17 0753)  SpO2: (!) 93 % (11/29/17 0932) Vital Signs (24h Range):  Temp:  [97.5 °F (36.4 °C)-100.8 °F (38.2 °C)] 100.6 °F (38.1 °C)  Pulse:  [77-91] 84  Resp:  [18-19] 18  SpO2:  [92 %-93 %] 93 %  BP: (120-148)/(59-66) 120/59     Weight: 89.9 kg (198 lb 3.1 oz)  Body mass index is 27.64 kg/m².    Intake/Output Summary (Last 24 hours) at 11/29/17 1026  Last data filed at 11/29/17 0149   Gross per 24 hour   Intake              350 ml   Output             1081 ml   Net             -731 ml      Physical Exam   Constitutional: He appears well-developed and well-nourished. He appears lethargic. No distress. Nasal cannula in place.   Pulmonary/Chest: Effort normal. No respiratory distress. He has rhonchi.   Musculoskeletal: He exhibits no edema or tenderness.   Neurological: He appears lethargic. He is disoriented.   Can lift both upper extremities   Nursing note and vitals reviewed.      Significant Labs:   CBC:     Recent Labs  Lab 11/29/17  0719   WBC 10.47   HGB 11.2*   HCT 33.5*        CMP:     Recent Labs  Lab 11/28/17  0537 11/29/17  0719    139   K 3.3* 3.6    104   CO2 26 27   * 142*   BUN 19 18   CREATININE 0.9 0.9   CALCIUM 8.9 8.9   ANIONGAP 11 8   EGFRNONAA >60 >60     Magnesium:     Recent Labs  Lab 11/28/17  0537 11/29/17  0719   MG 2.1 1.9     POCT Glucose:     Recent Labs  Lab 11/27/17  1545 11/27/17  2042 11/28/17  0704   POCTGLUCOSE 140* 144* 141*       Significant Imaging: I have reviewed all pertinent imaging results/findings within the past 24 hours.  Fl Modified Barium Swallow Speech 11/27/17:   Impressions  Pt presents with mild oropharyngeal dysphagia c/b above mentioned  deficits. Pt is at a mild-moderate risk for aspiration 2/2 pt's reduced pharyngeal clearance s/p swallow. (see above details)  Prognosis/Plan/Education  Pt's prognosis is fair given pt's diagnoses/co-morbidities and family support.   SLP educated pt and pt's wife on MBSS results and diet recs/swallow precautions. SLP also provided skilled education, via visual demonstration, to pt and pt's wife on how to appropriately thicken all liquids to nectar thick liquids. Pt's wife acknowledged and confirmed understanding, via teach back to SLP. SLP also educated MITCH Hubbard and MD Duke on pt's MBSS results and diet recs/swallow precautions.

## 2017-11-29 NOTE — PT/OT/SLP PROGRESS
"Physical Therapy  Treatment    Sung Buckley   MRN: [id number]   Admitting Diagnosis: Closed subcapital fracture of right femur with routine healing    PT Received On: 11/29/17  PT Start Time: 1407     PT Stop Time: 1449    PT Total Time (min): 42 min       Billable Minutes:  Therapeutic Activity 19. Co-treat with OT 23 minutes     Treatment Type: Treatment  PT/PTA: PT     PTA Visit Number: 0       General Precautions: Standard, fall  Orthopedic Precautions: RLE weight bearing as tolerated, RLE posterior precautions   Braces: N/A    Do you have any cultural, spiritual, Christian conflicts, given your current situation?: none reported to PT    Subjective:  Communicated with MITCH Jhaveri prior to session.  Pt very lethargic during treatment session.     Pain/Comfort  Pain Rating 1:  (pt did not rate pain, would say "ouch" with any movement or touching.)  Pain Rating Post-Intervention 1:  (pt did not rate any pain)    Objective:   Patient found with: oxygen, telemetry    Functional Mobility:  Bed Mobility:   Rolling/Turning to Left: Total assistance, With assist of 2  Rolling/Turning Right: With assist of 2, Total assistance  Scooting/Bridging: Total Assistance, With assist of 2  Supine to Sit: Total Assistance, With assist of 2  Sit to Supine: Total Assistance, With assist of  2    Transfers:  Sit <> Stand Assistance: Total Assistance, Other (see comments), Moderate Assistance (Assist of 2)  Sit <> Stand Assistive Device: Rolling Walker    Gait:   Gait Distance: Unable to take steps.     Stairs:  Activity did not occur.     Balance:   Static Sit: FAIR-: Maintains without assist but inconsistent   Dynamic Sit: FAIR: Cannot move trunk without losing balance  Static Stand: POOR: Needs MODERATE assist to maintain  Dynamic stand: 0: N/A     Therapeutic Activities and Exercises:  -Bed mobility as listed above  -Sit <>Stand X2 trials. 1st trial total X2, 2nd trial Mod X2 with Rw.   -Pt was able to slide right foot once.      AM-PAC " 6 CLICK MOBILITY  How much help from another person does this patient currently need?   1 = Unable, Total/Dependent Assistance  2 = A lot, Maximum/Moderate Assistance  3 = A little, Minimum/Contact Guard/Supervision  4 = None, Modified [place]/Independent    Turning over in bed (including adjusting bedclothes, sheets and blankets)?: 2  Sitting down on and standing up from a chair with arms (e.g., wheelchair, bedside commode, etc.): 2  Moving from lying on back to sitting on the side of the bed?: 2  Moving to and from a bed to a chair (including a wheelchair)?: 2  Need to walk in hospital room?: 1  Climbing 3-5 steps with a railing?: 1  Total Score: 10    AM-PAC Raw Score CMS G-Code Modifier Level of Impairment Assistance   6 % Total / Unable   7 - 9 CM 80 - 100% Maximal Assist   10 - 14 CL 60 - 80% Moderate Assist   15 - 19 CK 40 - 60% Moderate Assist   20 - 22 CJ 20 - 40% Minimal Assist   23 CI 1-20% SBA / CGA   24 CH 0% Independent/ Mod I     Patient left HOB elevated with all lines intact, call button in reach, bed alarm on, MITCH Monteiro notified and wife present.    Assessment:  Sung Buckley is a 72 y.o. male with a medical diagnosis of Closed subcapital fracture of right femur with routine healing and presents with decreased functional mobility compared to last treatment session. Pt is very somnolent and is unable to stay awake. While sitting EOB pt required verbal and tactile cueing to remain awake. This treatment session pt required increased assistance. Pt would benefit from continued PT services to improve current impairments.     Rehab identified problem list/impairments: Rehab identified problem list/impairments: weakness, impaired endurance, impaired sensation, impaired self care skills, impaired functional mobilty, impaired cognition, impaired balance, gait instability, decreased upper extremity function, decreased lower extremity function, decreased safety awareness, pain, orthopedic  precautions    Rehab potential is fair.    Activity tolerance: Fair    Discharge recommendations: Discharge Facility/Level Of Care Needs: nursing facility, skilled     Barriers to discharge: Barriers to Discharge: Decreased caregiver support    Equipment recommendations: Equipment Needed After Discharge: none     GOALS:    Physical Therapy Goals        Problem: Physical Therapy Goal    Goal Priority Disciplines Outcome Goal Variances Interventions   Physical Therapy Goal     PT/OT, PT Ongoing (interventions implemented as appropriate)     Description:  Goals to be met by: 17     Patient will increase functional independence with mobility by performin. Supine <> sit with Maximum Assistance  2. Sit to stand transfer with Moderate Assistance  3. Bed to chair transfer with Moderate Assistance using Rolling Walker  4. Gait  x 5 feet with Minimal Assistance using Rolling Walker.   5. Sitting at edge of bed x 15 minutes with Stand-by Assistance  6. Lower extremity exercise program x 10 reps per handout, with assistance as needed             Problem: Physical Therapy Goal    Goal Priority Disciplines Outcome Goal Variances Interventions   Physical Therapy Goal     PT/OT, PT                      PLAN:    Patient to be seen 6 x/week  to address the above listed problems via gait training, therapeutic activities, therapeutic exercises  Plan of Care expires: 17  Plan of Care reviewed with: patient, spouse         Prince Domínguez, PT  2017

## 2017-11-29 NOTE — CONSULTS
DATE OF CONSULTATION:  11/22/2017.    ATTENDING PHYSICIAN:  Dimitrios Duke    CONSULTANT:  Abimael Hallman.    REASON FOR CONSULTATION:  Evaluation, recommendation and treatment of right hip   fracture.    HISTORY OF PRESENT ILLNESS:  Mr. Buckley is a 72-year-old gentleman who fell in   the afternoon of 11/22/2017 while walking at home and sustained the above   injury.  He was taken to [facility] Emergency Room for evaluation of complaints   of right hip and x-rays as well as CT scan revealed the above findings.  The   patient has history of Alzheimer disease, neuropathy and coronary artery   disease.  The wife noted that the patient sleeps with rails up at home and she   found him on the floor and he was stating that he was trying to go to the   bathroom.  He ambulates with a walker at home and also uses a wheelchair   occasionally.  Pain with motion of the hip is rated 8 to 10/10.    PAST MEDICAL HISTORY:  Past medical history is as above:  1.  Alzheimer dementia.  2.  History of carpal tunnel syndrome.  3.  Depression.  4.  GERD.  5.  History of neuropathy and kidney stones.    MEDICATIONS AT ADMISSION:  Astelin, Celexa, Crestor, Colace, Aricept, Nexium,   ferrous sulfate, Folvite, Neurontin, Lopid, Synthroid, and Namenda.    SOCIAL HISTORY:  The patient lives at home with his wife.  No current history of   smoking or alcohol intake.    FAMILY HISTORY:  Negative for falls or fractures.    REVIEW OF SYSTEMS:  Review of systems obtained from the patient's wife and she   reports no recent weight gain or weight loss.  HEENT:  No head injuries.  CARDIOVASCULAR:  Per HPI, has history of cardiac disease.  PULMONARY:  He has occasional cough and aspirations.  GASTROINTESTINAL:  No nausea, vomiting, diarrhea.  GENITOURINARY:  No dysuria or hematuria.  NEUROLOGIC:  As above, history of depression and dementia.  MUSCULOSKELETAL:  The patient ambulates with a walker and wheelchair.    PHYSICAL EXAMINATION:  VITAL SIGNS:   Physical examination on admission, temperature 98.6, respirations   25, pulse 105, blood pressure 160/73, and oxygen saturations 94% on room air.    The patient weighs 89.8 kg and height is 6 feet 1 inch.  GENERAL:  The patient is alert to person only, wife is at bedside, appears to be   in no significant distress.  HEENT:  AT/NC.  Pharynx is benign.  NECK:  Nontender.  No JVD.  HEART:  Regular, 2/6 systolic ejection murmur.  LUNGS:  Clear.  Poor inspiratory effort.  ABDOMEN:  Soft and nontender.  No HSM.  EXTREMITIES:  Full range of motion of all joints both upper and left lower   extremity with no pain or deformity.  Right lower extremity externally rotated   and minimally shortened.  Dorsalis pedis and posterior tibial pulses are 2+.    Sensory is intact in all dermatomes.    X-rays of the right hip, AP pelvis in rotation views reveal a femoral neck   fracture somewhat difficult to discern.  CT scan was reviewed.  The femoral neck   fracture is more than 50% displaced, distal portion of the femur was displaced   posteriorly and has mild varus deformity of the fracture.    LABORATORY DATA:  White blood cell count 14.69, hematocrit 49.9.  Sodium is 140,   glucose 115, potassium 4.4.    ASSESSMENT:  1.  Fall with unstable femoral neck fracture partially displaced.  2.  History of Alzheimer dementia.  3.  History of coronary artery disease.  4.  History of iron deficiency anemia with normal hematocrit and hemoglobin.    PLAN:  I discussed the treatment options with the patient's wife.  He is   admitted to the hospitalist service and will be cleared for surgery.  Once this   is done, we will plan on proceeding to the operating room with hemiarthroplasty   of the hip.  I do not feel pinning of the hip is indicated as the fracture is   unstable and would give him the potential for future falls, would likely result   in loss of any reduction that we may be able to obtain.  He understood and   accepted the risks and  benefits as noted on the consent form.  Surgical site   marking was made and surgery was planned.      ANNA/MADAY  dd: 11/28/2017 17:37:48 (CST)  td: 11/29/2017 06:18:35 (CST)  Doc ID   #[id number]  Job ID #[id number]    CC:

## 2017-11-29 NOTE — PLAN OF CARE
Problem: Physical Therapy Goal  Goal: Physical Therapy Goal  Goals to be met by: 17     Patient will increase functional independence with mobility by performin. Supine <> sit with Maximum Assistance  2. Sit to stand transfer with Moderate Assistance  3. Bed to chair transfer with Moderate Assistance using Rolling Walker  4. Gait  x 5 feet with Minimal Assistance using Rolling Walker.   5. Sitting at edge of bed x 15 minutes with Stand-by Assistance  6. Lower extremity exercise program x 10 reps per handout, with assistance as needed     Outcome: Ongoing (interventions implemented as appropriate)  Continue working toward goals.

## 2017-11-29 NOTE — PLAN OF CARE
Problem: Patient Care Overview  Goal: Plan of Care Review  Outcome: Ongoing (interventions implemented as appropriate)  Pt SpO2 92% on comfort flow @ 20lpm,  FiO2 45%. Will continue to monitor.

## 2017-11-29 NOTE — PT/OT/SLP PROGRESS
Physical Therapy  Treatment    Sung Buckley   MRN: [id number]   Admitting Diagnosis: Closed subcapital fracture of right femur with routine healing    PT Received On: 11/28/17  PT Start Time: 1700     PT Stop Time: 1716    PT Total Time (min): 16 min       Billable Minutes:  Therapeutic Exercise 16    Treatment Type: Treatment  PT/PTA: PTA     PTA Visit Number: 2       General Precautions: Standard, fall  Orthopedic Precautions: RLE weight bearing as tolerated, RLE posterior precautions   Braces: N/A    Do you have any cultural, spiritual, Hoahaoism conflicts, given your current situation?: none reported to PT    Subjective:  Communicated with RN prior to session.  Attempted to see pt earlier, but pt was on bed pan.  Pt and pt's wife agreed to tx later in p.m.    Pain/Comfort  Pain Rating 1: 0/10 (no c/o pain during tx.)  Pain Rating Post-Intervention 1: 0/10    Objective:   Patient found with: telemetry, oxygen    Functional Mobility:  Bed Mobility:   Scooting/Bridging: Total Assistance, With assist of 2 (up to HOB)  Supine to Sit: Moderate Assistance    Transfers:  Sit <> Stand Assistance: Minimum Assistance  Sit <> Stand Assistive Device: Rolling Walker    Gait:   Gait Distance: unable to take steps    Stairs:  Therapeutic Activities and Exercises:  Supine BLE therex with all hip precautions maintained for RLE: AP, heelslides, hip ABD/ADD, SAQ 10 x 2 reps with Min to Max A with vc's.  Stretch to B HCs 3 reps each to pt tolerance. Pillow placed between BLE after tx and under calves with heels floated off pillow.  Review hip precautions with pt's wife.    AM-PAC 6 CLICK MOBILITY  How much help from another person does this patient currently need?   1 = Unable, Total/Dependent Assistance  2 = A lot, Maximum/Moderate Assistance  3 = A little, Minimum/Contact Guard/Supervision  4 = None, Modified [place]/Independent    Turning over in bed (including adjusting bedclothes, sheets and blankets)?: 2  Sitting down on and  standing up from a chair with arms (e.g., wheelchair, bedside commode, etc.): 3  Moving from lying on back to sitting on the side of the bed?: 2  Moving to and from a bed to a chair (including a wheelchair)?: 2  Need to walk in hospital room?: 1  Climbing 3-5 steps with a railing?: 1  Total Score: 11    AM-PAC Raw Score CMS G-Code Modifier Level of Impairment Assistance   6 % Total / Unable   7 - 9 CM 80 - 100% Maximal Assist   10 - 14 CL 60 - 80% Moderate Assist   15 - 19 CK 40 - 60% Moderate Assist   20 - 22 CJ 20 - 40% Minimal Assist   23 CI 1-20% SBA / CGA   24 CH 0% Independent/ Mod I     Patient left supine with all lines intact, call button in reach and RN  notified.    Assessment:  Sung Buckley is a 72 y.o. male with a medical diagnosis of Closed subcapital fracture of right femur with routine healing and presents with decreased strength, endurance, and mobility.  Pt worked with P.T. In a.m. And was seen again for BLE barbara in p.m.  Tolerated well with vc's and participated 70% of tx with remainder being PROM.  Pt would continue to benefit from P.T. To address impairments listed below.    Rehab identified problem list/impairments: Rehab identified problem list/impairments: weakness, impaired endurance, impaired self care skills, impaired functional mobilty, decreased ROM, impaired cognition, impaired cardiopulmonary response to activity, orthopedic precautions    Rehab potential is fair.    Activity tolerance: Fair    Discharge recommendations: Discharge Facility/Level Of Care Needs: nursing facility, skilled     Barriers to discharge: Barriers to Discharge: Decreased caregiver support    Equipment recommendations: Equipment Needed After Discharge: none     GOALS:    Physical Therapy Goals        Problem: Physical Therapy Goal    Goal Priority Disciplines Outcome Goal Variances Interventions   Physical Therapy Goal     PT/OT, PT Ongoing (interventions implemented as appropriate)     Description:   Goals to be met by: 17     Patient will increase functional independence with mobility by performin. Supine <> sit with Maximum Assistance  2. Sit to stand transfer with Moderate Assistance  3. Bed to chair transfer with Moderate Assistance using Rolling Walker  4. Gait  x 5 feet with Minimal Assistance using Rolling Walker.   5. Sitting at edge of bed x 15 minutes with Stand-by Assistance  6. Lower extremity exercise program x 10 reps per handout, with assistance as needed             Problem: Physical Therapy Goal    Goal Priority Disciplines Outcome Goal Variances Interventions   Physical Therapy Goal     PT/OT, PT                      PLAN:    Patient to be seen daily  to address the above listed problems via gait training, therapeutic activities, therapeutic exercises  Plan of Care expires: 17  Plan of Care reviewed with: patient, spouse, daughter         Deanna Ortiz, PTA  2017

## 2017-11-29 NOTE — PT/OT/SLP PROGRESS
"Occupational Therapy  Treatment    Sung Buckley   MRN: [id number]   Admitting Diagnosis: Closed subcapital fracture of right femur with routine healing    OT Date of Treatment: 11/29/17   OT Start Time: 1407  OT Stop Time: 1449  OT Total Time (min): 42 mins co-tx with PT    Billable Minutes:  Self Care/Home Management 15 and Therapeutic Activity 8    General Precautions: Standard, fall, respiratory, nectar thick  Orthopedic Precautions: RLE weight bearing as tolerated, RLE posterior precautions  Braces: N/A    Subjective:  Communicated with nurse, Shakila/Thania prior to session.    Pain/Comfort  Pain Rating 1:  (did not rate, but constantly yelling "ouch" during movement)  Location - Side 1: Right  Location - Orientation 1: generalized  Location 1:  (hip/leg)  Pain Addressed 1: Reposition, Distraction, Cessation of Activity, Nurse notified    Objective:  Patient found with: telemetry, oxygen     Functional Mobility:  Bed Mobility:  Scooting/Bridging: Total Assistance, With assist of 2  Supine to Sit: Total Assistance, With assist of 2  Sit to Supine: Total Assistance, With assist of 2    Transfers:   Sit <> Stand Assistance: Total Assistance, Moderate Assistance  Sit <> Stand Assistive Device: Rolling Walker    Functional Ambulation: N/A - unable to take steps    Activities of Daily Living:  Toileting Where Assessed: Bed level  Toileting Level of Assistance: Total assistance    Balance:   Static Sit: GOOD-: Takes MODERATE challenges from all directions but inconsistently  Dynamic Sit: FAIR: Cannot move trunk without losing balance  Static Stand: 0: Needs MAXIMAL assist to maintain   Dynamic stand: 0: N/A    Therapeutic Activities and Exercises:  Patient extremely lethargic. Max verbal and tactile cues to remain alert. Soiled and Dep for changing. Total (A) for rolling B. Bed mob as noted above. Sat EOB and stood with RW with total (A) of 2. Patient unable to achieve full upright and immediately attempted to sit down. " "Patient stood a second time with mod (A) of 2 and bed height elevated. Unable to initiate a step -- stated "I can't!". Patient dep to HOB via drawsheet. RN notified patient soiled with BM again and of back rash, pointed out by wife.     AM-PAC 6 CLICK ADL   How much help from another person does this patient currently need?   1 = Unable, Total/Dependent Assistance  2 = A lot, Maximum/Moderate Assistance  3 = A little, Minimum/Contact Guard/Supervision  4 = None, Modified [place]/Independent    Putting on and taking off regular lower body clothing? : 1  Bathing (including washing, rinsing, drying)?: 1  Toileting, which includes using toilet, bedpan, or urinal? : 1  Putting on and taking off regular upper body clothing?: 2  Taking care of personal grooming such as brushing teeth?: 2  Eating meals?: 2  Total Score: 9     AM-PAC Raw Score CMS "G-Code Modifier Level of Impairment Assistance   6 % Total / Unable   7 - 8 CM 80 - 100% Maximal Assist   9-13 CL 60 - 80% Moderate Assist   14 - 19 CK 40 - 60% Moderate Assist   20 - 22 CJ 20 - 40% Minimal Assist   23 CI 1-20% SBA / CGA   24 CH 0% Independent/ Mod I       Patient left HOB elevated with all lines intact, call button in reach, bed alarm on, RN notified and wife present    ASSESSMENT: Patient required total (A) for all mobility this date. Extremely somnolent and required max cues to remain alert and keep eyes open. Patient requires 24/7 (A) and care. Will benefit from skilled OT to address functional deficits.   Sung Buckley is a 72 y.o. male with a medical diagnosis of Closed subcapital fracture of right femur with routine healing     Rehab identified problem list/impairments: Rehab identified problem list/impairments: weakness, impaired endurance, impaired self care skills, impaired functional mobilty, gait instability, impaired balance, impaired cognition, decreased upper extremity function, decreased lower extremity function, decreased ROM, abnormal " tone, pain, impaired skin, decreased safety awareness, orthopedic precautions    Rehab potential is fair.    Activity tolerance: Poor    Discharge recommendations: Discharge Facility/Level Of Care Needs: nursing facility, skilled     Barriers to discharge: Barriers to Discharge: Decreased caregiver support    Equipment recommendations: none     GOALS:    Occupational Therapy Goals        Problem: Occupational Therapy Goal    Goal Priority Disciplines Outcome Interventions   Occupational Therapy Goal     OT, PT/OT Ongoing (interventions implemented as appropriate)    Description:  Goals to be met by: 12/24     Patient will increase functional independence with ADLs by performing:    Feeding with Set-up Assistance. --MET 11/28  Grooming while seated with Minimal Assistance.  Sitting at edge of bed x10 minutes with Moderate Assistance. --MET 11/27  Rolling to Bilateral with Moderate Assistance.   Supine to sit with Maximum Assistance.  Toilet transfer to bedside commode with Maximum Assistance.                        Plan:  Patient to be seen 5 x/week to address the above listed problems via self-care/home management, therapeutic activities, therapeutic exercises  Plan of Care expires: 12/24/17  Plan of Care reviewed with: patient, spouse         MATTY Matamoros  11/29/2017

## 2017-11-30 PROBLEM — R21 RASH OF BACK: Status: ACTIVE | Noted: 2017-11-30

## 2017-11-30 PROBLEM — B37.0 ORAL THRUSH: Status: ACTIVE | Noted: 2017-11-30

## 2017-11-30 LAB
ANION GAP SERPL CALC-SCNC: 10 MMOL/L
BUN SERPL-MCNC: 18 MG/DL
CALCIUM SERPL-MCNC: 8.8 MG/DL
CHLORIDE SERPL-SCNC: 104 MMOL/L
CO2 SERPL-SCNC: 24 MMOL/L
CREAT SERPL-MCNC: 0.9 MG/DL
EST. GFR  (AFRICAN AMERICAN): >60 ML/MIN/1.73 M^2
EST. GFR  (NON AFRICAN AMERICAN): >60 ML/MIN/1.73 M^2
GLUCOSE SERPL-MCNC: 130 MG/DL
MAGNESIUM SERPL-MCNC: 2.1 MG/DL
PHOSPHATE SERPL-MCNC: 3.2 MG/DL
POTASSIUM SERPL-SCNC: 3.8 MMOL/L
SODIUM SERPL-SCNC: 138 MMOL/L
VANCOMYCIN TROUGH SERPL-MCNC: 8.5 UG/ML

## 2017-11-30 PROCEDURE — 25000003 PHARM REV CODE 250: Performed by: HOSPITALIST

## 2017-11-30 PROCEDURE — 36415 COLL VENOUS BLD VENIPUNCTURE: CPT

## 2017-11-30 PROCEDURE — 11000001 HC ACUTE MED/SURG PRIVATE ROOM

## 2017-11-30 PROCEDURE — 27100092 HC HIGH FLOW DELIVERY CANNULA

## 2017-11-30 PROCEDURE — 84100 ASSAY OF PHOSPHORUS: CPT

## 2017-11-30 PROCEDURE — 63600175 PHARM REV CODE 636 W HCPCS: Performed by: HOSPITALIST

## 2017-11-30 PROCEDURE — 92526 ORAL FUNCTION THERAPY: CPT

## 2017-11-30 PROCEDURE — 27100171 HC OXYGEN HIGH FLOW UP TO 24 HOURS

## 2017-11-30 PROCEDURE — 83735 ASSAY OF MAGNESIUM: CPT

## 2017-11-30 PROCEDURE — 97530 THERAPEUTIC ACTIVITIES: CPT

## 2017-11-30 PROCEDURE — 63600175 PHARM REV CODE 636 W HCPCS

## 2017-11-30 PROCEDURE — 94761 N-INVAS EAR/PLS OXIMETRY MLT: CPT

## 2017-11-30 PROCEDURE — 94664 DEMO&/EVAL PT USE INHALER: CPT

## 2017-11-30 PROCEDURE — 80202 ASSAY OF VANCOMYCIN: CPT

## 2017-11-30 PROCEDURE — 25000003 PHARM REV CODE 250: Performed by: NURSE PRACTITIONER

## 2017-11-30 PROCEDURE — 99900035 HC TECH TIME PER 15 MIN (STAT)

## 2017-11-30 PROCEDURE — 80048 BASIC METABOLIC PNL TOTAL CA: CPT

## 2017-11-30 PROCEDURE — 27000173 HC ACAPELLA DEVICE DH OR DM

## 2017-11-30 PROCEDURE — 97110 THERAPEUTIC EXERCISES: CPT

## 2017-11-30 RX ORDER — FLUCONAZOLE 100 MG/1
100 TABLET ORAL DAILY
Status: DISCONTINUED | OUTPATIENT
Start: 2017-12-01 | End: 2017-12-07 | Stop reason: HOSPADM

## 2017-11-30 RX ORDER — FLUCONAZOLE 2 MG/ML
200 INJECTION, SOLUTION INTRAVENOUS ONCE
Status: COMPLETED | OUTPATIENT
Start: 2017-11-30 | End: 2017-11-30

## 2017-11-30 RX ORDER — KETOROLAC TROMETHAMINE 10 MG/1
10 TABLET, FILM COATED ORAL EVERY 6 HOURS PRN
Status: DISCONTINUED | OUTPATIENT
Start: 2017-11-30 | End: 2017-12-02

## 2017-11-30 RX ADMIN — NEOMYCIN SULFATE, POLYMYXIN B SULFATE AND GRAMICIDIN 1 DROP: 1.75; 10000; .025 SOLUTION/ DROPS OPHTHALMIC at 12:11

## 2017-11-30 RX ADMIN — MEMANTINE HYDROCHLORIDE 10 MG: 5 TABLET ORAL at 10:11

## 2017-11-30 RX ADMIN — ENOXAPARIN SODIUM 30 MG: 100 INJECTION SUBCUTANEOUS at 08:11

## 2017-11-30 RX ADMIN — PIPERACILLIN AND TAZOBACTAM 4.5 G: 4; .5 INJECTION, POWDER, LYOPHILIZED, FOR SOLUTION INTRAVENOUS; PARENTERAL at 03:11

## 2017-11-30 RX ADMIN — POTASSIUM & SODIUM PHOSPHATES POWDER PACK 280-160-250 MG 2 PACKET: 280-160-250 PACK at 12:11

## 2017-11-30 RX ADMIN — LEVETIRACETAM 250 MG: 250 TABLET, FILM COATED ORAL at 10:11

## 2017-11-30 RX ADMIN — PIPERACILLIN AND TAZOBACTAM 4.5 G: 4; .5 INJECTION, POWDER, LYOPHILIZED, FOR SOLUTION INTRAVENOUS; PARENTERAL at 08:11

## 2017-11-30 RX ADMIN — GEMFIBROZIL 600 MG: 600 TABLET ORAL at 08:11

## 2017-11-30 RX ADMIN — FERROUS SULFATE TAB EC 325 MG (65 MG FE EQUIVALENT) 325 MG: 325 (65 FE) TABLET DELAYED RESPONSE at 12:11

## 2017-11-30 RX ADMIN — LEVETIRACETAM 250 MG: 250 TABLET, FILM COATED ORAL at 01:11

## 2017-11-30 RX ADMIN — VANCOMYCIN HYDROCHLORIDE 1000 MG: 1 INJECTION, POWDER, LYOPHILIZED, FOR SOLUTION INTRAVENOUS at 08:11

## 2017-11-30 RX ADMIN — POTASSIUM & SODIUM PHOSPHATES POWDER PACK 280-160-250 MG 2 PACKET: 280-160-250 PACK at 10:11

## 2017-11-30 RX ADMIN — LEVOTHYROXINE SODIUM 25 MCG: 25 TABLET ORAL at 05:11

## 2017-11-30 RX ADMIN — POTASSIUM & SODIUM PHOSPHATES POWDER PACK 280-160-250 MG 2 PACKET: 280-160-250 PACK at 04:11

## 2017-11-30 RX ADMIN — FERROUS SULFATE TAB EC 325 MG (65 MG FE EQUIVALENT) 325 MG: 325 (65 FE) TABLET DELAYED RESPONSE at 04:11

## 2017-11-30 RX ADMIN — FOLIC ACID 1 MG: 1 TABLET ORAL at 10:11

## 2017-11-30 RX ADMIN — LEVETIRACETAM 250 MG: 250 TABLET, FILM COATED ORAL at 05:11

## 2017-11-30 RX ADMIN — FOLIC ACID 1 MG: 1 TABLET ORAL at 08:11

## 2017-11-30 RX ADMIN — NEOMYCIN SULFATE, POLYMYXIN B SULFATE AND GRAMICIDIN 1 DROP: 1.75; 10000; .025 SOLUTION/ DROPS OPHTHALMIC at 05:11

## 2017-11-30 RX ADMIN — FERROUS SULFATE TAB EC 325 MG (65 MG FE EQUIVALENT) 325 MG: 325 (65 FE) TABLET DELAYED RESPONSE at 08:11

## 2017-11-30 RX ADMIN — FLUCONAZOLE 200 MG: 2 INJECTION INTRAVENOUS at 01:11

## 2017-11-30 RX ADMIN — POTASSIUM & SODIUM PHOSPHATES POWDER PACK 280-160-250 MG 2 PACKET: 280-160-250 PACK at 08:11

## 2017-11-30 RX ADMIN — KETOROLAC TROMETHAMINE 10 MG: 10 TABLET, FILM COATED ORAL at 10:11

## 2017-11-30 RX ADMIN — DONEPEZIL HYDROCHLORIDE 10 MG: 5 TABLET, FILM COATED ORAL at 08:11

## 2017-11-30 RX ADMIN — PANTOPRAZOLE SODIUM 40 MG: 40 TABLET, DELAYED RELEASE ORAL at 08:11

## 2017-11-30 NOTE — PT/OT/SLP PROGRESS
Physical Therapy  Treatment    Sung Buckley   MRN: [id number]   Admitting Diagnosis: Closed subcapital fracture of right femur with routine healing    PT Received On: 11/30/17  PT Start Time: 1115     PT Stop Time: 1143    PT Total Time (min): 28 min       Billable Minutes:  Therapeutic Activity 14 and Total Time 28    Treatment Type: Treatment (co-rx with OT)  PT/PTA: PTA     PTA Visit Number: 1       General Precautions: Standard, fall, nectar thick, respiratory  Orthopedic Precautions: RLE weight bearing as tolerated, RLE posterior precautions   Braces: N/A    Do you have any cultural, spiritual, Rastafarian conflicts, given your current situation?: none reported to PT    Subjective:  Communicated with nsg prior to session.           Objective:   Patient found with: oxygen, peripheral IV, telemetry    Functional Mobility:  Bed Mobility:   Supine to Sit: Total Assistance, With assist of 2  Sit to Supine: Total Assistance, With assist of  2    Transfers:       Gait:        Stairs:      Balance:   Static Sit: FAIR-: Maintains without assist but inconsistent   Dynamic Sit: FAIR: Cannot move trunk without losing balance  Static Stand:   Dynamic stand:      Therapeutic Activities and Exercises: le seated AA laq's with SBA/CGA,sat EOB ~15 mins with SBS/CGA,HF O2 donned at all times with some v/c's to have pt open eyes and for posture,dynamic reaching activities with OT and CGA      AM-PAC 6 CLICK MOBILITY  How much help from another person does this patient currently need?   1 = Unable, Total/Dependent Assistance  2 = A lot, Maximum/Moderate Assistance  3 = A little, Minimum/Contact Guard/Supervision  4 = None, Modified [place]/Independent    Turning over in bed (including adjusting bedclothes, sheets and blankets)?: 2  Sitting down on and standing up from a chair with arms (e.g., wheelchair, bedside commode, etc.): 2  Moving from lying on back to sitting on the side of the bed?: 2  Moving to and from a bed to a chair  (including a wheelchair)?: 2  Need to walk in hospital room?: 1  Climbing 3-5 steps with a railing?: 1  Total Score: 10    AM-PAC Raw Score CMS G-Code Modifier Level of Impairment Assistance   6 % Total / Unable   7 - 9 CM 80 - 100% Maximal Assist   10 - 14 CL 60 - 80% Moderate Assist   15 - 19 CK 40 - 60% Moderate Assist   20 - 22 CJ 20 - 40% Minimal Assist   23 CI 1-20% SBA / CGA   24 CH 0% Independent/ Mod I     Patient left supine with all lines intact, call button in reach, bed alarm on, nsg notified and wife present.    Assessment: tolerated fair with increased fatigue,requires total assist of two for bed mobility,will require SNF upon discharge  Sung Buckley is a 72 y.o. male with a medical diagnosis of Closed subcapital fracture of right femur with routine healing and presents with .    Rehab identified problem list/impairments: Rehab identified problem list/impairments: weakness, impaired endurance, impaired functional mobilty, decreased upper extremity function, decreased lower extremity function, decreased ROM, impaired skin, impaired cardiopulmonary response to activity    Rehab potential is good.    Activity tolerance: Fair    Discharge recommendations: Discharge Facility/Level Of Care Needs: nursing facility, skilled     Barriers to discharge: Barriers to Discharge: Decreased caregiver support    Equipment recommendations: Equipment Needed After Discharge:  (defer to SNF)     GOALS: see general POC   Physical Therapy Goals        Problem: Physical Therapy Goal    Goal Priority Disciplines Outcome Goal Variances Interventions   Physical Therapy Goal     PT/OT, PT Ongoing (interventions implemented as appropriate)     Description:  Goals to be met by: 17     Patient will increase functional independence with mobility by performin. Supine <> sit with Maximum Assistance  2. Sit to stand transfer with Moderate Assistance  3. Bed to chair transfer with Moderate Assistance using Rolling  Walker  4. Gait  x 5 feet with Minimal Assistance using Rolling Walker.   5. Sitting at edge of bed x 15 minutes with Stand-by Assistance MET 11/30/17  6. Lower extremity exercise program x 10 reps per handout, with assistance as needed              Problem: Physical Therapy Goal    Goal Priority Disciplines Outcome Goal Variances Interventions   Physical Therapy Goal     PT/OT, PT                      PLAN:    Patient to be seen 6 x/week  to address the above listed problems via gait training, therapeutic activities, therapeutic exercises  Plan of Care expires: 12/24/17  Plan of Care reviewed with: patient, spouse         Rickey DENIS David, PTA  11/30/2017

## 2017-11-30 NOTE — PLAN OF CARE
Problem: Patient Care Overview  Goal: Plan of Care Review  PT on Comfort Flow at 20L on 40% sating 92%. Pt on prn therapy. No tx needed at this time. No respiratory distress noted. Will continue to monitor.

## 2017-11-30 NOTE — PLAN OF CARE
Problem: SLP Goal  Goal: SLP Goal  SLP Short Term Goals:  1. Patient will successfully participate in ongoing clinical swallow evaluation and tolerate po trials with no overt s/s of aspiration. --MET 11/27  2. Pt will safely consume full liquid diet w/ NECTAR thickened liquids w/ no overt s/s of aspiration at supervision level.-discontinue   3. Pt will successfully participate in Modified Barium Swallow study to radiographically assess swallow function, rule out aspiration and determine safest/least restrictive diet.  -MET 11/27  4. Pt will tolerate dental soft/finely chopped meats/thin liquids with no overt s/s of aspiration. -ongoing  Outcome: Ongoing (interventions implemented as appropriate)  11/30/2017: Pt with limited participation in direct dysphagia tx this AM. Pt's spouse reported concerns about pt's decreased po intake since admit. Pt was observed to be lethargic throughout session and required mod-max cuing from SLP (auditory and tactile) to participate. Pt tolerated nectar thick liquids x5, puree x4, and dental soft textures x2. However, pt deferred further trials of solids and liquids.   SLP RECS: con't dental soft/finely chopped meats with extra gravy/ NECTAR thick liquids; all liquids thickened to nectar thick, assistance with meals, with double swallow technique per sip/bite, NO straws, meds crushed in puree textures and other universal swallow precautions. SLP recs calorie count to be posted in pt's room to ensure pt is meeting nutritional needs with current po diet. SLP reported results/recs to MITCH Jhaveri and MD White.  RADHA Dominguez., CF-SLP  Speech-Language Pathologist

## 2017-11-30 NOTE — ASSESSMENT & PLAN NOTE
Acute respiratory failure with hypoxia  CTA with multilobar infiltrates.  Blood cultures are NGTD.  Finished 8 days of piperacillin-tazobactam.  Continue vancomycin (day 6). Wean oxygen as able.  Echo with normal LVEF but with diastolic dysfunction.

## 2017-11-30 NOTE — PROGRESS NOTES
[facility] Hospital Medicine  Progress Note    Patient Name: Sung Buckely  MRN: [id number]  Patient Class: IP- Inpatient   Admission Date: 11/22/2017  Length of Stay: 8 days  Attending Physician: Jack White MD  Primary Care Provider: Maren Monterroso MD        Subjective:     Principal Problem:Closed subcapital fracture of right femur with routine healing    HPI:  Sung Buckley is a 72 y.o. white man with seizure disorder, hyperlipidemia, Alzheimer's and vascular dementia, dysphagia, neuropathy, and depression.  He lives in [place], [place].  He is .  His primary care physician is Dr. Maren Monterroso.  His neurologist is Dr. Bernardo Carrera.     He was taken to [facility] on 11/22/17 with right hip pain after a fall at home.  His wife reported that she was cooking breakfast when she heard him calling out for her and complaining of pain.  He sleeps in a hospital bed and the rails were up at the time.  She found him on the floor and he said that he was trying to get up to go to the bathroom.  She said that he normally calls for her assistance, but he did not this time.  He has a walker and wheelchair that he uses to get around the house with and the wheelchair was beside his bed, but he was not using it at this time.  He denied any loss of consciousness, light headedness, palpitations or chest pain prior to the fall.  He complained of pain in the right hip that is constant, sharp, and 8/10 in intensity.     Hospital Course:  He required supplemental oxygen on admission and had fever overnight the first night prior to going to the operating room.  He had right hemiarthroplasty by Dr. Abimael Hallman on 11/23/17 and was monitored in the ICU postoperatively.  He was started on piperacillin-tazobactam for possible aspiration pneumonia.  Respiratory status worsened and required comfort flow at 85% FiO2.  CTA chest was negative for pulmonary embolism, but did show  multilobar opacities predominantly in the lower lobes that likely represent pneumonia vs pneumonitis.  Vancomycin was added as he continued to have recurrent fever.  Modified barium swallow study was performed on 11/27/17 and dental soft diet with nectar thickened liquids was recommended.  His wife noticed him to be increasingly somnolent.  ABG on 11/29/17 showed hypoxia but no hypercapnia.  Follow-up chest x-ray showed no change.  His wife noticed oral thrush and observed him reaching for parts of his body as if he was in pain.  He was given ketorolac and rested afterward.      Interval History: Still somnolent, but wife believes he is having significant pain.  She noted oral thrush and many small sores on his back.    Review of Systems   Unable to perform ROS: Dementia     Objective:     Vital Signs (Most Recent):  Temp: 99.2 °F (37.3 °C) (11/30/17 0744)  Pulse: 79 (11/30/17 0900)  Resp: 18 (11/30/17 0900)  BP: 136/60 (11/30/17 0744)  SpO2: (!) 92 % (11/30/17 0900) Vital Signs (24h Range):  Temp:  [98.3 °F (36.8 °C)-100 °F (37.8 °C)] 99.2 °F (37.3 °C)  Pulse:  [72-94] 79  Resp:  [17-20] 18  SpO2:  [90 %-92 %] 92 %  BP: (130-165)/(60-73) 136/60     Weight: 89.9 kg (198 lb 3.1 oz)  Body mass index is 27.64 kg/m².    Intake/Output Summary (Last 24 hours) at 11/30/17 1115  Last data filed at 11/30/17 0405   Gross per 24 hour   Intake              200 ml   Output              346 ml   Net             -146 ml      Physical Exam   Constitutional: He appears well-developed and well-nourished. He appears lethargic. No distress. Nasal cannula in place.   HENT:   White plaques on tongue   Pulmonary/Chest: Effort normal. No respiratory distress. He has rhonchi.   Musculoskeletal: He exhibits no edema or tenderness.   Neurological: He appears lethargic. He is disoriented.   Skin: Rash noted.   Nursing note and vitals reviewed.      Significant Labs:   CBC:     Recent Labs  Lab 11/29/17  0719 11/29/17  1120   WBC 10.47  --     HGB 11.2*  --    HCT 33.5* 33*     --      CMP:     Recent Labs  Lab 11/29/17  0719 11/30/17  0740    138   K 3.6 3.8    104   CO2 27 24   * 130*   BUN 18 18   CREATININE 0.9 0.9   CALCIUM 8.9 8.8   ANIONGAP 8 10   EGFRNONAA >60 >60     Magnesium:     Recent Labs  Lab 11/29/17  0719 11/30/17  0740   MG 1.9 2.1     POCT Glucose:   No results for input(s): POCTGLUCOSE in the last 48 hours.    Significant Imaging: I have reviewed all pertinent imaging results/findings within the past 24 hours.  X-Ray Chest AP Portable 11/30/17: Continued ill-defined patchy opacities within the lungs bilaterally left greater than right. There is no significant new lung opacity. No large pleural effusion or pneumothorax. Continued atherosclerotic aorta. Clinical correlation and followup advised    Assessment/Plan:      * Closed subcapital fracture of right femur with routine healing    S/p right hip hemiarthroplasty  Fall  Dr. Hallman performed hemiarthroplasty 11/23/17. Turn q2. Non-opiates for pain control. Docusate BID. Lovenox for DVT ppx. PT/OT. Will need SNF placement once respiratory status is improved.         Rash of back    Due to lying in bed too much.  Also has impressions of bed sheet creases all over his back.          Oral thrush    Start fluconazole.          Somnolence    Not on any new sedating medications.  Not hypercapnic.  Treat pain to see if that helps.  EEG to rule out seizures.          Aspiration pneumonia of both lungs    Acute respiratory failure with hypoxia  CTA with multilobar infiltrates.  Blood cultures are NGTD.  Continue piperacillin-tazobactam and vancomycin to cover aspiration pneumonia (D# 8/5). Wean oxygen as able.  Echo with normal LVEF but with diastolic dysfunction.         Seizure disorder    Continue levetiracetam at 250 mg TID.  EEG to evaluate if his somnolence is due to seizures.        Iron deficiency anemia secondary to inadequate dietary iron intake    Ferritin  was 16 in 2016. Continue iron supplement.           Oropharyngeal dysphagia    Appreciate SLP assistance.  Dental soft diet with nectar thickened liquids.         Polyneuropathy    Gait disorder  Continue  folic acid and B12. PT/OT post-operatively. Hold gabapentin for now.         Depression with anxiety    Continue citalopram.           Mixed Alzheimer's and vascular dementia    Continue donepezil and memantine. At high risk for delirium. Hold opiates for now.           Mixed hyperlipidemia    Continue rosuvastatin and gemfibrozil.             VTE Risk Mitigation         Ordered     enoxaparin injection 30 mg  Daily     Route:  Subcutaneous        11/23/17 1242     Place sequential compression device  Until discontinued      11/23/17 1242     High Risk of VTE  Once      11/23/17 1242              Jack White MD  Department of Hospital Medicine   [facility]

## 2017-11-30 NOTE — PT/OT/SLP PROGRESS
"Occupational Therapy  Treatment    Sung Buckley   MRN: [id number]   Admitting Diagnosis: Closed subcapital fracture of right femur with routine healing    OT Date of Treatment: 11/30/17   OT Start Time: 1115  OT Stop Time: 1143  OT Total Time (min): 28 min    Billable Minutes:  Therapeutic Exercise 14    General Precautions: Standard, fall  Orthopedic Precautions: RLE weight bearing as tolerated, RLE posterior precautions  Braces:           Subjective:  Communicated with nsg prior to session.  Pt with increased lethargy during session; nursing clearing from EOB only 2/2 decreased   Pain/Comfort  Pain Rating 1:  (did not rate, yells " ouch" with movements to BUEs )    Objective:        Functional Mobility:  Bed Mobility:  Scooting/Bridging: Total Assistance, With assist of 2 (seated scooting to HOB; supine scooting to HOB )  Supine to Sit: Total Assistance, With assist of 2  Sit to Supine: Total Assistance, With assist of 2    Transfers:   Sit <> Stand Assistance: Activity did not occur    Functional Ambulation: does not occur       Activities of Daily Living:    Grooming Position: EOB, Seated  Grooming Level of Assistance: Maximum assistance, Total assistance (pt attempted to wash face, however, only cleaning ~ 10 % of face with wash cloth requiring total A for completion of ax; total A for use of yanker/suction )  Toileting Where Assessed: Bed level  Toileting Level of Assistance: Total assistance      Balance:   Static Sit: FAIR: Maintains without assist, but unable to take any challenges   Dynamic Sit: FAIR: Cannot move trunk without losing balance      Therapeutic Activities and Exercises:  1 x 10 AAROM/PROM BUEs chest press; should press  1 x 4 B UE seated reaching exercises across midline for increased balance  Increased time for participation in all axs 2/2 increased lethargy     AM-PAC 6 CLICK ADL   How much help from another person does this patient currently need?   1 = Unable, Total/Dependent Assistance  2 = " "A lot, Maximum/Moderate Assistance  3 = A little, Minimum/Contact Guard/Supervision  4 = None, Modified [place]/Independent    Putting on and taking off regular lower body clothing? : 1  Bathing (including washing, rinsing, drying)?: 1  Toileting, which includes using toilet, bedpan, or urinal? : 1  Putting on and taking off regular upper body clothing?: 2  Taking care of personal grooming such as brushing teeth?: 2  Eating meals?: 2  Total Score: 9     AM-PAC Raw Score CMS "G-Code Modifier Level of Impairment Assistance   6 % Total / Unable   7 - 8 CM 80 - 100% Maximal Assist   9-13 CL 60 - 80% Moderate Assist   14 - 19 CK 40 - 60% Moderate Assist   20 - 22 CJ 20 - 40% Minimal Assist   23 CI 1-20% SBA / CGA   24 CH 0% Independent/ Mod I       Patient left supine with all lines intact, call button in reach, bed alarm on, nsg notified and spouse  present    ASSESSMENT:  Sung Buckley is a 72 y.o. male with a medical diagnosis of Closed subcapital fracture of right femur with routine healing. Pt with increased lethargy during session; limited to EOB axs. Cont OT POC     Rehab identified problem list/impairments: Rehab identified problem list/impairments: weakness, gait instability, decreased lower extremity function, decreased upper extremity function, decreased ROM, impaired balance, impaired endurance, impaired cardiopulmonary response to activity, decreased safety awareness, impaired self care skills, impaired cognition, pain, impaired skin, orthopedic precautions, impaired functional mobilty, edema, decreased coordination    Rehab potential is good.    Activity tolerance: Fair     Discharge recommendations: Discharge Facility/Level Of Care Needs: nursing facility, skilled     Barriers to discharge: Barriers to Discharge: Decreased caregiver support    Equipment recommendations:  (per SNF)     GOALS:    Occupational Therapy Goals        Problem: Occupational Therapy Goal    Goal Priority Disciplines " Outcome Interventions   Occupational Therapy Goal     OT, PT/OT Ongoing (interventions implemented as appropriate)    Description:  Goals to be met by: 12/24     Patient will increase functional independence with ADLs by performing:    Feeding with Set-up Assistance. --MET 11/28  Grooming while seated with Minimal Assistance.  Sitting at edge of bed x10 minutes with Moderate Assistance. --MET 11/27  Rolling to Bilateral with Moderate Assistance.   Supine to sit with Maximum Assistance.  Toilet transfer to bedside commode with Maximum Assistance.                        Plan:  Patient to be seen 5 x/week to address the above listed problems via self-care/home management, therapeutic activities, therapeutic exercises  Plan of Care expires: 12/24/17  Plan of Care reviewed with: patient, spouse         Elina Baxter OT  11/30/2017

## 2017-11-30 NOTE — CONSULTS
Consult received for calorie count. Hung up calorie count sheet in pt room. Explained to pt wife. Recorded his breakfast & lunch intake. Also notified RN Shakila. Will follow up with results.

## 2017-11-30 NOTE — SUBJECTIVE & OBJECTIVE
Interval History: Still somnolent, but wife believes he is having significant pain.  She noted oral thrush and many small sores on his back.    Review of Systems   Unable to perform ROS: Dementia     Objective:     Vital Signs (Most Recent):  Temp: 99.2 °F (37.3 °C) (11/30/17 0744)  Pulse: 79 (11/30/17 0900)  Resp: 18 (11/30/17 0900)  BP: 136/60 (11/30/17 0744)  SpO2: (!) 92 % (11/30/17 0900) Vital Signs (24h Range):  Temp:  [98.3 °F (36.8 °C)-100 °F (37.8 °C)] 99.2 °F (37.3 °C)  Pulse:  [72-94] 79  Resp:  [17-20] 18  SpO2:  [90 %-92 %] 92 %  BP: (130-165)/(60-73) 136/60     Weight: 89.9 kg (198 lb 3.1 oz)  Body mass index is 27.64 kg/m².    Intake/Output Summary (Last 24 hours) at 11/30/17 1115  Last data filed at 11/30/17 0405   Gross per 24 hour   Intake              200 ml   Output              346 ml   Net             -146 ml      Physical Exam   Constitutional: He appears well-developed and well-nourished. He appears lethargic. No distress. Nasal cannula in place.   HENT:   White plaques on tongue   Pulmonary/Chest: Effort normal. No respiratory distress. He has rhonchi.   Musculoskeletal: He exhibits no edema or tenderness.   Neurological: He appears lethargic. He is disoriented.   Skin: Rash noted.   Nursing note and vitals reviewed.      Significant Labs:   CBC:     Recent Labs  Lab 11/29/17 0719 11/29/17  1120   WBC 10.47  --    HGB 11.2*  --    HCT 33.5* 33*     --      CMP:     Recent Labs  Lab 11/29/17 0719 11/30/17  0740    138   K 3.6 3.8    104   CO2 27 24   * 130*   BUN 18 18   CREATININE 0.9 0.9   CALCIUM 8.9 8.8   ANIONGAP 8 10   EGFRNONAA >60 >60     Magnesium:     Recent Labs  Lab 11/29/17 0719 11/30/17  0740   MG 1.9 2.1     POCT Glucose:   No results for input(s): POCTGLUCOSE in the last 48 hours.    Significant Imaging: I have reviewed all pertinent imaging results/findings within the past 24 hours.  X-Ray Chest AP Portable 11/30/17: Continued ill-defined patchy  opacities within the lungs bilaterally left greater than right. There is no significant new lung opacity. No large pleural effusion or pneumothorax. Continued atherosclerotic aorta. Clinical correlation and followup advised

## 2017-11-30 NOTE — PLAN OF CARE
Problem: Patient Care Overview  Goal: Plan of Care Review  Pt arises to touch and speech. Wife at bedside; very attentive to patients needs. Remains on high flow oxygen at 6L, Suction set up.  IV abx regimen maintained. Remains free from falls. Safety maintained; no distress noted. Will continue to monitor.

## 2017-11-30 NOTE — PLAN OF CARE
Problem: Occupational Therapy Goal  Goal: Occupational Therapy Goal  Goals to be met by: 12/24     Patient will increase functional independence with ADLs by performing:    Feeding with Set-up Assistance. --MET 11/28  Grooming while seated with Minimal Assistance.  Sitting at edge of bed x10 minutes with Moderate Assistance. --MET 11/27  Rolling to Bilateral with Moderate Assistance.   Supine to sit with Maximum Assistance.  Toilet transfer to bedside commode with Maximum Assistance.       Outcome: Ongoing (interventions implemented as appropriate)  Pt with increased lethargy during session; limited to EOB axs. Cont OT POC

## 2017-11-30 NOTE — PLAN OF CARE
Problem: Patient Care Overview  Goal: Plan of Care Review  Outcome: Revised  Patient arouses to touch, has slept on and off throughout this shift.  Wife at bedside with daughter visiting earlier.  Portable chest x-ray performed at 1704.  Patient cooperative, follows direction.  Safety maintained, will continue to monitor.

## 2017-11-30 NOTE — PT/OT/SLP PROGRESS
Speech Language Pathology  Dysphagia Treatment    Sung Buckley   MRN: [id number]   Admitting Diagnosis: Closed subcapital fracture of right femur with routine healing    Diet recommendations: Solid Diet Level: Dental Soft, Finely chopped meat  Liquid Diet Level: Nectar Thick Feed only when awake/alert, No straws, HOB to 90 degrees, Small bites/sips, Alternating bites/sips, 1 bite/sip at a time, Remain upright 30 minutes post meal, Double swallow with each bite/sip, Meds crushed in puree, Assistance with meals and Assistance with thickening liquids    SLP Treatment Date: 11/30/17  Speech Start Time: 0935     Speech Stop Time: 0946     Speech Total (min): 11 min       TREATMENT BILLABLE MINUTES:  Treatment Swallowing Dysfunction 11    Has the patient been evaluated by SLP for swallowing? : Yes  Keep patient NPO?: No   General Precautions: Standard, fall, nectar thick, respiratory  Current Respiratory Status: nasal cannula       Subjective:  Pt found in bed with pt's spouse at bedside. Pt appeared lethargic and required mod-max auditory and tactile cuing from SLP to wake. Pt with limited participation during session today. Pt's spouse reported concerns tp SLP about pt meeting nutritional needs 2/2 decreased po intake         Objective:   Patient found with: oxygen, telemetry  Pt with limited participation in direct dysphagia tx this AM. Pt's spouse reported concerns about pt's decreased po intake since admit. Pt was observed to be lethargic throughout session and required mod-max cuing from SLP (auditory and tactile) to participate. Pt tolerated nectar thick liquids x5, puree x4, and dental soft textures x2. However, pt deferred further trials of solids and liquids.       Assessment:  Sung Buckley is a 72 y.o. male with a medical diagnosis of Closed subcapital fracture of right femur with routine healing and continues to present with oropharyngeal dysphagia. Pt now presents wit decreased po intake. SLP RECS: con't dental  soft/finely chopped meats with extra gravy/ NECTAR thick liquids; all liquids thickened to nectar thick, assistance with meals, with double swallow technique per sip/bite, NO straws, meds crushed in puree textures and other universal swallow precautions. SLP recs calorie count to be posted in pt's room to ensure pt is meeting nutritional needs with current po diet. SLP reported results/recs to MITCH Jhaveri and MD White.    Discharge recommendations: Discharge Facility/Level Of Care Needs: nursing facility, skilled     Goals:    SLP Goals        Problem: SLP Goal    Goal Priority Disciplines Outcome   SLP Goal     SLP Ongoing (interventions implemented as appropriate)   Description:  SLP Short Term Goals:  1. Patient will successfully participate in ongoing clinical swallow evaluation and tolerate po trials with no overt s/s of aspiration. --MET 11/27  2. Pt will safely consume full liquid diet w/ NECTAR thickened liquids w/ no overt s/s of aspiration at supervision level.-discontinue   3. Pt will successfully participate in Modified Barium Swallow study to radiographically assess swallow function, rule out aspiration and determine safest/least restrictive diet.  -MET 11/27  4. Pt will tolerate dental soft/finely chopped meats/thin liquids with no overt s/s of aspiration. -ongoing                               Plan:   Patient to be seen Therapy Frequency: 3 x/week   Plan of Care expires: 12/24/17  Plan of Care reviewed with: patient, spouse (MITCH Jhaveri and MD White)  SLP Follow-up?: Yes  SLP - Next Visit Date: 12/01/17            FAYE Dominguez, CF-SLP  Speech-Language Pathologist   11/30/2017

## 2017-12-01 LAB
ANION GAP SERPL CALC-SCNC: 8 MMOL/L
BACTERIA BLD CULT: NORMAL
BACTERIA BLD CULT: NORMAL
BUN SERPL-MCNC: 16 MG/DL
CALCIUM SERPL-MCNC: 8.6 MG/DL
CHLORIDE SERPL-SCNC: 107 MMOL/L
CO2 SERPL-SCNC: 23 MMOL/L
CREAT SERPL-MCNC: 0.9 MG/DL
ERYTHROCYTE [DISTWIDTH] IN BLOOD BY AUTOMATED COUNT: 15.1 %
EST. GFR  (AFRICAN AMERICAN): >60 ML/MIN/1.73 M^2
EST. GFR  (NON AFRICAN AMERICAN): >60 ML/MIN/1.73 M^2
GLUCOSE SERPL-MCNC: 171 MG/DL
HCT VFR BLD AUTO: 33.6 %
HGB BLD-MCNC: 11 G/DL
MAGNESIUM SERPL-MCNC: 2 MG/DL
MCH RBC QN AUTO: 28.4 PG
MCHC RBC AUTO-ENTMCNC: 32.7 G/DL
MCV RBC AUTO: 87 FL
PHOSPHATE SERPL-MCNC: 2.9 MG/DL
PLATELET # BLD AUTO: 470 K/UL
PMV BLD AUTO: 9 FL
POTASSIUM SERPL-SCNC: 3.8 MMOL/L
RBC # BLD AUTO: 3.87 M/UL
SODIUM SERPL-SCNC: 138 MMOL/L
WBC # BLD AUTO: 8.42 K/UL

## 2017-12-01 PROCEDURE — 63600175 PHARM REV CODE 636 W HCPCS: Performed by: HOSPITALIST

## 2017-12-01 PROCEDURE — 36415 COLL VENOUS BLD VENIPUNCTURE: CPT

## 2017-12-01 PROCEDURE — 94664 DEMO&/EVAL PT USE INHALER: CPT

## 2017-12-01 PROCEDURE — 25000003 PHARM REV CODE 250: Performed by: HOSPITALIST

## 2017-12-01 PROCEDURE — 83735 ASSAY OF MAGNESIUM: CPT

## 2017-12-01 PROCEDURE — 97530 THERAPEUTIC ACTIVITIES: CPT

## 2017-12-01 PROCEDURE — 95819 EEG AWAKE AND ASLEEP: CPT | Mod: 26,,, | Performed by: PSYCHIATRY & NEUROLOGY

## 2017-12-01 PROCEDURE — 92526 ORAL FUNCTION THERAPY: CPT

## 2017-12-01 PROCEDURE — 63600175 PHARM REV CODE 636 W HCPCS

## 2017-12-01 PROCEDURE — 94761 N-INVAS EAR/PLS OXIMETRY MLT: CPT

## 2017-12-01 PROCEDURE — 95816 EEG AWAKE AND DROWSY: CPT

## 2017-12-01 PROCEDURE — 80048 BASIC METABOLIC PNL TOTAL CA: CPT

## 2017-12-01 PROCEDURE — 97803 MED NUTRITION INDIV SUBSEQ: CPT

## 2017-12-01 PROCEDURE — 27100171 HC OXYGEN HIGH FLOW UP TO 24 HOURS

## 2017-12-01 PROCEDURE — 99900035 HC TECH TIME PER 15 MIN (STAT)

## 2017-12-01 PROCEDURE — 84100 ASSAY OF PHOSPHORUS: CPT

## 2017-12-01 PROCEDURE — 97535 SELF CARE MNGMENT TRAINING: CPT

## 2017-12-01 PROCEDURE — 27100092 HC HIGH FLOW DELIVERY CANNULA

## 2017-12-01 PROCEDURE — 85027 COMPLETE CBC AUTOMATED: CPT

## 2017-12-01 PROCEDURE — 11000001 HC ACUTE MED/SURG PRIVATE ROOM

## 2017-12-01 PROCEDURE — 25000003 PHARM REV CODE 250: Performed by: NURSE PRACTITIONER

## 2017-12-01 RX ADMIN — POTASSIUM & SODIUM PHOSPHATES POWDER PACK 280-160-250 MG 2 PACKET: 280-160-250 PACK at 07:12

## 2017-12-01 RX ADMIN — ENOXAPARIN SODIUM 30 MG: 100 INJECTION SUBCUTANEOUS at 08:12

## 2017-12-01 RX ADMIN — VANCOMYCIN HYDROCHLORIDE 1000 MG: 1 INJECTION, POWDER, LYOPHILIZED, FOR SOLUTION INTRAVENOUS at 02:12

## 2017-12-01 RX ADMIN — GEMFIBROZIL 600 MG: 600 TABLET ORAL at 08:12

## 2017-12-01 RX ADMIN — POTASSIUM & SODIUM PHOSPHATES POWDER PACK 280-160-250 MG 2 PACKET: 280-160-250 PACK at 11:12

## 2017-12-01 RX ADMIN — PANTOPRAZOLE SODIUM 40 MG: 40 TABLET, DELAYED RELEASE ORAL at 08:12

## 2017-12-01 RX ADMIN — LEVETIRACETAM 250 MG: 250 TABLET, FILM COATED ORAL at 02:12

## 2017-12-01 RX ADMIN — LEVETIRACETAM 250 MG: 250 TABLET, FILM COATED ORAL at 06:12

## 2017-12-01 RX ADMIN — CITALOPRAM HYDROBROMIDE 20 MG: 20 TABLET ORAL at 04:12

## 2017-12-01 RX ADMIN — LEVETIRACETAM 250 MG: 250 TABLET, FILM COATED ORAL at 09:12

## 2017-12-01 RX ADMIN — MEMANTINE HYDROCHLORIDE 10 MG: 5 TABLET ORAL at 09:12

## 2017-12-01 RX ADMIN — FOLIC ACID 1 MG: 1 TABLET ORAL at 09:12

## 2017-12-01 RX ADMIN — QUETIAPINE FUMARATE 12.5 MG: 25 TABLET ORAL at 09:12

## 2017-12-01 RX ADMIN — KETOROLAC TROMETHAMINE 10 MG: 10 TABLET, FILM COATED ORAL at 06:12

## 2017-12-01 RX ADMIN — FERROUS SULFATE TAB EC 325 MG (65 MG FE EQUIVALENT) 325 MG: 325 (65 FE) TABLET DELAYED RESPONSE at 04:12

## 2017-12-01 RX ADMIN — DONEPEZIL HYDROCHLORIDE 10 MG: 5 TABLET, FILM COATED ORAL at 06:12

## 2017-12-01 RX ADMIN — ACETAMINOPHEN 650 MG: 325 TABLET ORAL at 02:12

## 2017-12-01 RX ADMIN — POTASSIUM & SODIUM PHOSPHATES POWDER PACK 280-160-250 MG 2 PACKET: 280-160-250 PACK at 09:12

## 2017-12-01 RX ADMIN — FLUCONAZOLE 100 MG: 100 TABLET ORAL at 08:12

## 2017-12-01 RX ADMIN — FERROUS SULFATE TAB EC 325 MG (65 MG FE EQUIVALENT) 325 MG: 325 (65 FE) TABLET DELAYED RESPONSE at 07:12

## 2017-12-01 RX ADMIN — NEOMYCIN SULFATE, POLYMYXIN B SULFATE AND GRAMICIDIN 1 DROP: 1.75; 10000; .025 SOLUTION/ DROPS OPHTHALMIC at 02:12

## 2017-12-01 RX ADMIN — ROSUVASTATIN CALCIUM 10 MG: 10 TABLET, FILM COATED ORAL at 04:12

## 2017-12-01 RX ADMIN — FERROUS SULFATE TAB EC 325 MG (65 MG FE EQUIVALENT) 325 MG: 325 (65 FE) TABLET DELAYED RESPONSE at 11:12

## 2017-12-01 RX ADMIN — LEVOTHYROXINE SODIUM 25 MCG: 25 TABLET ORAL at 06:12

## 2017-12-01 RX ADMIN — POTASSIUM & SODIUM PHOSPHATES POWDER PACK 280-160-250 MG 2 PACKET: 280-160-250 PACK at 04:12

## 2017-12-01 RX ADMIN — NEOMYCIN SULFATE, POLYMYXIN B SULFATE AND GRAMICIDIN 1 DROP: 1.75; 10000; .025 SOLUTION/ DROPS OPHTHALMIC at 06:12

## 2017-12-01 RX ADMIN — FOLIC ACID 1 MG: 1 TABLET ORAL at 08:12

## 2017-12-01 RX ADMIN — PIPERACILLIN AND TAZOBACTAM 4.5 G: 4; .5 INJECTION, POWDER, LYOPHILIZED, FOR SOLUTION INTRAVENOUS; PARENTERAL at 04:12

## 2017-12-01 NOTE — PLAN OF CARE
Problem: Patient Care Overview  Goal: Plan of Care Review  Outcome: Ongoing (interventions implemented as appropriate)  Pt is AAOx3, disoriented to time. Pt is lethargic and complains of restless legs, No N/V. Pt with IV antibiotics. Pt refused stool softener due to loose Bm. Pt on dental soft diet and nectar thick liquids. Meds crushed and given with applesauce.  Incision remains closed and intact without drainage. Vancomycin  was discontinued by MD order. New IV to left hand, pt pulled out right wrist IV accidentally. Wife at bedside.

## 2017-12-01 NOTE — ASSESSMENT & PLAN NOTE
Likely due to disrupted sleep/wake cycle, so give quetiapine 12.5 mg tonight.  Also has seizure disorder so EEG to rule out seizure activity.

## 2017-12-01 NOTE — PROGRESS NOTES
"    [facility]  Adult Nutrition  Consult Note    SUMMARY     Recommendations    Calorie count results Day 1:   280.5 kcals / 5 g protein.   Pt needs 2082.33kcals/day and 73-90gm/day    Recommendation/Intervention:   1. Rec bene protein BID.  2. Continue dental soft diet with nectar thick liquids.   3. Will follow up with remaining calorie count results.  4. Encourage PO intake of meals and supplements.     RD to follow  Goals: >85% of EEN, EPPN  Nutrition Goal Status: new     Reason for Assessment    Reason for Assessment: physician consult  Diagnosis:  (Closed subcapital fracture of right femur)  Relevent Medical History: GERD, depression, HLD, Alzheimers   Interdisciplinary Rounds: did not attend  General Information Comments: Pt. asleep at time of visit, wife at bedside. Pt received right hemiarthroplasty 11/23/17.  Calorie count results Day 1:   280.5 kcals / 5 g protein.   Pt needs 2082.33kcals/day and 73-90gm/day.   Nutrition Discharge Planning: DC on Regular diet, texture per SLP.    Nutrition Prescription Ordered    Current Diet Order: Dental soft, nectar thick liquids  Oral Nutrition Supplement: Boost plus TID     Evaluation of Received Nutrients/Fluid Intake    Energy Calories Required: not meeting needs  Protein Required: not meeting needs  Fluid Required: not meeting needs  % Intake of Estimated Energy Needs: 0 - 25 %  % Meal Intake: less than 25%     Nutrition Risk Screen     Nutrition Risk Screen: no indicators present    Labs/Tests/Procedures/Meds    Pertinent Labs Reviewed: reviewed  Pertinent Labs Comments:  (glucose 171, Ca 8.6, A1C 6.6)  Pertinent Medications Reviewed: reviewed  Pertinent Medications Comments:  (pantoprazole, statin, folic acid)    Physical Findings    Skin: incision to right hip    Anthropometrics    Height: 5' 10.98" (180.3 cm)  Weight Method: Bed Scale  Weight: 89.9 kg (198 lb 3.1 oz)  Ideal Body Weight (IBW), Male: 171.88 lb  % Ideal Body Weight, Male (lb): " 115.31 lb  BMI (Calculated): 27.7  BMI Grade: 25 - 29.9 - overweight     Estimated/Assessed Needs    Weight Used For Calorie Calculations: 89.9 kg (198 lb 3.1 oz)   Height (cm): 180.3 cm  Energy Calorie Requirements (kcal): 2082.33kcals/day  Energy Need Method: Monona-St Jeor (x 1.25(PAL))  RMR (Monona-St. Jeor Equation): 1665.87  Weight Used For Protein Calculations: 89.9 kg (198 lb 3.1 oz)  Protein Requirements: 89.9-107.88 gm/day (0.8-1.0g/kg)  Fluid Need Method: RDA Method (or per MD. )  RDA Method (mL): 2082.33    Assessment and Plan    Nutrition Problem  Inadequate energy needs    Related to (etiology):   Physiological causes    Signs and Symptoms (as evidenced by):   < 25% of meals consumed    Interventions/Recommendations (treatment strategy):  See recs    Nutrition Diagnosis Status:   New     Monitor and Evaluation    Food and Nutrient Intake: food and beverage intake  Physical Activity and Function: nutrition-related ADLs and IADLs  Anthropometric Measurements: weight, weight change  Biochemical Data, Medical Tests and Procedures: electrolyte and renal panel, gastrointestinal profile, glucose/endocrine profile, inflammatory profile, lipid profile  Nutrition-Focused Physical Findings: overall appearance    Nutrition Risk    Level of Risk:  (2x/ week)    Nutrition Follow-Up    RD Follow-up?: Yes    I certify that I, Yoselin Espinoza RD,  directed the dietetic intern in service delivery and guided them using my skilled judgment. As the cosigning dietitian, I have reviewed the dietetic interns documentation and am responsible for the treatment, assessment, and plan.

## 2017-12-01 NOTE — ASSESSMENT & PLAN NOTE
Related to (etiology):   hx    Signs and Symptoms (as evidenced by):   Restricted diet per ST    Interventions/Recommendations (treatment strategy):  See recs    Nutrition Diagnosis Status:   New

## 2017-12-01 NOTE — PT/OT/SLP PROGRESS
"Speech Language Pathology  Treatment    Sung Buckley   MRN: [id number]   Admitting Diagnosis: Closed subcapital fracture of right femur with routine healing    Diet recommendations: Solid Diet Level: Finely chopped meat, Dental Soft  Liquid Diet Level: Nectar Thick   · Upright for meals  · Add thickener for all liquids for NECTAR thick liquids  · No jello, no thin liquids and no plain soups add thickener  · Crush oral meds and encourage boost with meals    SLP Treatment Date: 12/01/17  Speech Start Time: 1204     Speech Stop Time: 1213     Speech Total (min): 9 min       TREATMENT BILLABLE MINUTES:  Treatment Swallowing Dysfunction 9    Has the patient been evaluated by SLP for swallowing? : Yes  Keep patient NPO?: No   General Precautions: Standard, fall (hip fracture)  Current Respiratory Status: high flow NC       Subjective:  Pt seen at bedside for direct and indirect dysphagia tx.   Pt resting in bed, did require verbal cues to arouse.    Pain/Comfort  Pain Rating 1: 4/10  Location 1:  (hip when HOB was elevated)    Objective:   Patient found with: oxygen, telemetry, peripheral IV   Pt found in room, laying supine in bed.   Pt reports he is "resting"   Wife at bedside and reported minimal po intake during am meal.  Pt required verbal cues to arouse and elevate HOB.   Pt trialed with 2 sips of nectar thick juice and took one bite of diced pears.   Pt reported, "not hungry" Wife cites that pt has no appetite and MD feels seizure medication is causing the issue. Pt just had EED done and results not available.  Pt instructed in basic lingual and laryngeal ex x2 completed, wife reports, he is tired. Pt with poor effort noted.   Discussed importance with eating for energy needed to participate in skilled tx.   He did not acknowledge SLP's info but wife did nod in agreement.     FIM:                                 Assessment:  Sung Buckley is a 72 y.o. male with a medical diagnosis of Closed subcapital fracture of right " femur with routine healing and presents with oral and pharyngeal dysphagia, poor po intake, calorie count in progress.     Discharge recommendations: Discharge Facility/Level Of Care Needs: nursing facility, skilled     Goals:    SLP Goals        Problem: SLP Goal    Goal Priority Disciplines Outcome   SLP Goal     SLP Ongoing (interventions implemented as appropriate)   Description:  SLP Short Term Goals:  1. Patient will successfully participate in ongoing clinical swallow evaluation and tolerate po trials with no overt s/s of aspiration. --MET 11/27  2. Pt will safely consume full liquid diet w/ NECTAR thickened liquids w/ no overt s/s of aspiration at supervision level.-discontinue   3. Pt will successfully participate in Modified Barium Swallow study to radiographically assess swallow function, rule out aspiration and determine safest/least restrictive diet.  -MET 11/27  4. Pt will tolerate dental soft/finely chopped meats/thin liquids with no overt s/s of aspiration. -ongoing              Multidisciplinary Problems (Resolved)        Problem: SLP Goal    Goal Priority Disciplines Outcome   SLP Goal   (Resolved)     SLP  Error                    Plan:   Patient to be seen Therapy Frequency: 3 x/week   Plan of Care expires: 12/24/17  Plan of Care reviewed with: patient, spouse  SLP Follow-up?: Yes  SLP - Next Visit Date: 12/02/17           ODELL Cabello, CCC-SLP  12/01/2017

## 2017-12-01 NOTE — PLAN OF CARE
KEN U Cola assisting with pt's SNF plcmt post d/c   per MsMonika Lyman, pt has been accepted by wife's 1st choice:  [facility]     pt remains somnolent, hiflow O2 / hypoxia     11/23  s/ p R hip hemiarthroplasty due to fall  pt for SNF plcm pending improvement of Resp status   SW U Cola facilitating plcmt:    1  [facility], 2 [facility], 3.  [facility] 4.  [facility]        wife Ivis p #  [phone]    pt lives at home with wife - had [facility]  until last wk   pt has :  [facility], ttb, walker, [facility] bed            12/01/17 1211   Discharge Reassessment   Assessment Type Discharge Planning Assessment   Provided patient/caregiver education on the expected discharge date and the discharge plan Yes   Do you have any problems affording any of your prescribed medications? TBD   Discharge Plan A Skilled Nursing Facility   Patient choice form signed by patient/caregiver No   Can the patient answer the patient profile reliably? No, cognitively impaired

## 2017-12-01 NOTE — PLAN OF CARE
Problem: Occupational Therapy Goal  Goal: Occupational Therapy Goal  Goals to be met by: 12/24     Patient will increase functional independence with ADLs by performing:    Feeding with Set-up Assistance. --MET 11/28  Grooming while seated with Minimal Assistance.  Sitting at edge of bed x10 minutes with Moderate Assistance. --MET 11/27  Rolling to Bilateral with Moderate Assistance.   Supine to sit with Maximum Assistance.  Toilet transfer to bedside commode with Maximum Assistance.       Outcome: Ongoing (interventions implemented as appropriate)  Pt with increased alertness/participation in session this date. Tolerating standing trials EOB with decreased assist from previously assessed. Cont OT POC

## 2017-12-01 NOTE — CONSULTS
[facility] Consult Note    Reason for Consult -   Seizure disorder    History of Present Illness -   Mr. Buckley is a 73 y/o M w/ a history of mixed vascular and Alzheimer's dementia, Hyperlipidemia, and neuropathy who presented to [facility] on 11/22/17 due to R hip pain. His wife reported that Mr. Buckley sleeps in a hospital bed and she found him on floor calling out to her for help. There was no reported loss of consciousness, light headedness, palpitations, or chest pain prior to the fall. Mr. Buckley underwent a R hip hemiarthroplasty per orthopedic surgery on 11/23/17 due to a right displaced femoral neck fracture. Pt has been working with PT/OT/ST post-operatively.      Mr. Buckley is followed as an outpatient by neurologist, Dr. Carrera. He was last seen by Dr. Carrera in Oct 2017 who reports: In 2017 he had syncopal episodes in early 2017 and was admitted to [facility] where he was started on Keppra. An EEG was completed that was normal. No seizure-like activity was reported and it was felt that it was probably medication related due to morphine patches and sleep medications. The sedating medications were stopped, and Mr. Buckley's wife discontinued the Keppra, and the pt had no further problems.    At time of evaluation, Mr. Buckley was more alert and his wife reports that today his mental status and alertness have improved. She states that he slept well overnight and was alert throughout the day with 1 or 2 brief naps. He was able to feed himself.     Review of Systems -  GENERAL: No fever. No chills. No fatigue. Denies weight loss. Denies weight gain.  HEENT: Denies headaches. Denies vision change. Denies eye pain. Denies double vision. Denies ear pain.   CV: No chest pain.  CHEST: Denies of shortness of breath.  GI: Denies constipation. No diarrhea. No abdominal pain. Denies nausea. Denies vomiting. No blood in stool.   HEME: Denies bleeding problems.  : Denies urgency. No painful urination. No blood in urine.  MS:  "Denies joint stiffness. Denies joint swelling.  Denies back pain.  SKIN: Denies rash.   NEURO: Denies seizures. No weakness.  PSYCH:  Denies difficulty sleeping. No anxiety. Denies depression. No suicidal thoughts.       Past Medical History - as above  Social History - unable to assess due to baseline dementia, however he lives at home with his wife who is the primary caregiver  Family History - unable to assess  Allergies - NKDA      Vitals -     Vitals:    12/01/17 0814 12/01/17 1100 12/01/17 1152 12/01/17 1159   BP:   123/71    BP Location:   Right arm    Patient Position:   Lying    Pulse: 72  65 62   Resp: 16  18 18   Temp:   98.1 °F (36.7 °C)    TempSrc:   Oral    SpO2: (!) 94%  (!) 93% (!) 92%   Weight:  89.9 kg (198 lb 3.1 oz)     Height:  5' 10.98" (1.803 m)         Neurological Exam   Mental Status:  Alert and oriented to first and last name, stated incorrect age.     Language:  No aphasia, no dysarthria.     Cranial Nerves:  Visual fields were intact to confrontation, no RAPD, no anisocoria, EOM intact bilaterally, V1-V3 with good sensation to light touch, no facial asymmetry, hearing grossly intact, palate, and tongue midline. Good shoulder rug.     Motor:  Strength: 5/5 in all 4 extremities through out.   Tone: Good muscle tone.    No pronator drift    DTRs:  Symmetric and 3+ through out.     Sensation:  Intact to light touch through out.  Vibration and proprioception intact    Cerebellar:  Finger to nose intact  Did not assess lower extremities due to recent surgery and patient complaint of pain.    Gait:  Deferred due to hip surgery    Labs -      Recent Labs  Lab 11/25/17  0623  11/27/17  0626 11/28/17  0537 11/29/17  0719 11/29/17  1120 11/30/17  0740 12/01/17  0401   WBC 9.85  --  9.53  --  10.47  --   --  8.42   HGB 13.1*  --  11.3*  --  11.2*  --   --  11.0*   HCT 39.3*  --  32.9*  --  33.5* 33*  --  33.6*   *  --  207  --  337  --   --  470*     < > 138 138 139  --  138 138   K " 3.4*  < > 3.2* 3.3* 3.6  --  3.8 3.8     < > 101 101 104  --  104 107   CO2 23  < > 27 26 27  --  24 23   BUN 18  < > 15 19 18  --  18 16   *  < > 142* 131* 142*  --  130* 171*   < > = values in this interval not displayed.    Imaging -   MRI Brain w/ and w/o contrast (9/22/16)  Findings: There is no midline shift, hydrocephalus, or mass effect.  There are age advanced generalized cerebral involutional changes with prominence of the sulci, cisterns, and ventricles.  There are mild chronic microvascular ischemic changes.  There is no restricted diffusion to suggest infarction.  There is no evidence of acute intracranial hemorrhage.  There are no abnormal extra-axial fluid collections or enhancing lesions.  The major intracranial flow voids are patent.  The pituitary gland and craniocervical junction demonstrate no significant abnormality.  The orbits demonstrate no significant abnormality.  There is mucosal membrane thickening in the sphenoid, maxillary, ethmoid and frontal sinuses.  Impression   No acute intracranial abnormality, specifically no evidence of acute infarction or hemorrhage.    Age advanced generalized cerebral volume loss.    Mild diffuse paranasal sinus disease.    Assessment and Plan -     Altered Mental Status:  -Mr. Buckley has a questionable diagnosis of a previous seizure disorder diagnosis per outpatient neurologist. Pt was started on Keppra due to syncopal episodes without tonic-clonic seizure activity. Pt's wife weaned Mr. Buckley from Keppra 250 mg PO TID. Upon further history, the patient's wife states that while at home he has brief episodes of staring and unresponsiveness concerning for complex partial seizures.  -No seizure activity has been reported during admission and Mr. Buckley's level of alertness has improved. There is no neurological reason to prevent Mr. Buckley from receiving inpatient rehab.  -Keppra 250 mg PO TID was started at time of admission.  -Recommend increasing  to Keppra 750 mg PO Q12H.    Case was discussed w/ staff, Dr. Hernandez.    If you have any questions regarding the above consult/recommendations, please contact the on-call [facility] resident.    Jonnie Mota MD  [facility] PGY 3    Once again, I have signed all the notes for today.  This appeared in my queue.  It is dated for 12/9/17 and 12/1/17.  Not quite sure what to do about it.  cb

## 2017-12-01 NOTE — PLAN OF CARE
Problem: Patient Care Overview  Goal: Plan of Care Review  Outcome: Revised  Patient is awake and alert.  Sleeps on and off most of the shift.  Wife is at bedside.  Patient worked with PT.  Patient uses urinal this evening per wife.  Patient continues to receive antibiotics.  Tolerates all meals without any nausea or vomiting.  Right hip is well-approximated and intact.  Safety maintained.

## 2017-12-01 NOTE — SUBJECTIVE & OBJECTIVE
Interval History: His wife reported that he was fully awake this morning when their daughter visited, but afterward was somnolent again.  Last night he was fidgety and had restless sleep.    Review of Systems   Unable to perform ROS: Dementia     Objective:     Vital Signs (Most Recent):  Temp: 98.4 °F (36.9 °C) (12/01/17 0758)  Pulse: 72 (12/01/17 0814)  Resp: 16 (12/01/17 0814)  BP: 135/65 (12/01/17 0758)  SpO2: (!) 94 % (12/01/17 0814) Vital Signs (24h Range):  Temp:  [98.1 °F (36.7 °C)-100 °F (37.8 °C)] 98.4 °F (36.9 °C)  Pulse:  [65-83] 72  Resp:  [16-20] 16  SpO2:  [92 %-97 %] 94 %  BP: (128-137)/(64-68) 135/65     Weight: 89.9 kg (198 lb 3.1 oz)  Body mass index is 27.64 kg/m².    Intake/Output Summary (Last 24 hours) at 12/01/17 0925  Last data filed at 12/01/17 0415   Gross per 24 hour   Intake              850 ml   Output                0 ml   Net              850 ml      Physical Exam   Constitutional: He appears well-developed and well-nourished. No distress. Nasal cannula in place.   Pulmonary/Chest: Effort normal. No respiratory distress. He has rhonchi.   Musculoskeletal: He exhibits no edema or tenderness.   Neurological: He is alert. He is disoriented.   Psychiatric: His affect is blunt. Cognition and memory are impaired.   Nursing note and vitals reviewed.      Significant Labs:   CBC:     Recent Labs  Lab 11/29/17  1120 12/01/17  0401   WBC  --  8.42   HGB  --  11.0*   HCT 33* 33.6*   PLT  --  470*     CMP:     Recent Labs  Lab 11/30/17  0740 12/01/17  0401    138   K 3.8 3.8    107   CO2 24 23   * 171*   BUN 18 16   CREATININE 0.9 0.9   CALCIUM 8.8 8.6*   ANIONGAP 10 8   EGFRNONAA >60 >60     Magnesium:     Recent Labs  Lab 11/30/17  0740 12/01/17  0401    2.1 2.0       Significant Imaging: I have reviewed all pertinent imaging results/findings within the past 24 hours.

## 2017-12-01 NOTE — PT/OT/SLP PROGRESS
Physical Therapy  Treatment    Sung Buckley   MRN: [id number]   Admitting Diagnosis: Closed subcapital fracture of right femur with routine healing    PT Received On: 12/01/17  PT Start Time: 1312     PT Stop Time: 1342    PT Total Time (min): 30 min       Billable Minutes:  Therapeutic Activity 15 and Total Time 30    Treatment Type: Treatment (co-rx with OT)  PT/PTA: PTA     PTA Visit Number: 2       General Precautions: Standard, fall  Orthopedic Precautions: RLE weight bearing as tolerated, RLE posterior precautions   Braces:  (ABD pillow)    Do you have any cultural, spiritual, Caodaism conflicts, given your current situation?: none reported to PT    Subjective:  Communicated with nsg prior to session.      Pain/Comfort  Pain Rating 1:  (no c/o's)    Objective:   Patient found with: oxygen, peripheral IV, telemetry    Functional Mobility:  Bed Mobility:   Rolling/Turning to Left: Maximum assistance  Rolling/Turning Right: Maximum assistance  Scooting/Bridging: Maximum Assistance  Supine to Sit: Maximum Assistance, With assist of 2, With side rail  Sit to Supine: Maximum Assistance, With assist of  2    Transfers:  Sit <> Stand Assistance: Minimum Assistance (of 2 X 2 trials,pt stood ~1.5 mins per trial,unable to sidestep and WB on R le)  Sit <> Stand Assistive Device: Rolling Walker    Gait:        Stairs:      Balance:   Static Sit: FAIR: Maintains without assist, but unable to take any challenges   Dynamic Sit: FAIR: Cannot move trunk without losing balance  Static Stand: POOR+: Needs MINIMAL assist to maintain  Dynamic stand: 0: N/A     Therapeutic Activities and Exercises: assisted OT with cleaning soiled pt and diaper change,Max A for rolling with ABD pillow       AM-PAC 6 CLICK MOBILITY  How much help from another person does this patient currently need?   1 = Unable, Total/Dependent Assistance  2 = A lot, Maximum/Moderate Assistance  3 = A little, Minimum/Contact Guard/Supervision  4 = None, Modified  [place]/Independent    Turning over in bed (including adjusting bedclothes, sheets and blankets)?: 2  Sitting down on and standing up from a chair with arms (e.g., wheelchair, bedside commode, etc.): 2  Moving from lying on back to sitting on the side of the bed?: 2  Moving to and from a bed to a chair (including a wheelchair)?: 2  Need to walk in hospital room?: 1  Climbing 3-5 steps with a railing?: 1  Total Score: 10    AM-PAC Raw Score CMS G-Code Modifier Level of Impairment Assistance   6 % Total / Unable   7 - 9 CM 80 - 100% Maximal Assist   10 - 14 CL 60 - 80% Moderate Assist   15 - 19 CK 40 - 60% Moderate Assist   20 - 22 CJ 20 - 40% Minimal Assist   23 CI 1-20% SBA / CGA   24 CH 0% Independent/ Mod I     Patient left supine with all lines intact, call button in reach, bed alarm on and wife present.    Assessment: improved mobility and alertness today,difficulty trying to WB through R le,unable to unweight L le,will require SNF  Sung Buckley is a 72 y.o. male with a medical diagnosis of Closed subcapital fracture of right femur with routine healing and presents with .    Rehab identified problem list/impairments: Rehab identified problem list/impairments: weakness, impaired endurance, impaired functional mobilty, gait instability, impaired balance, decreased upper extremity function, decreased lower extremity function, pain, decreased ROM, impaired coordination, impaired cardiopulmonary response to activity, orthopedic precautions, impaired skin    Rehab potential is good.    Activity tolerance: Fair    Discharge recommendations: Discharge Facility/Level Of Care Needs: nursing facility, skilled     Barriers to discharge: Barriers to Discharge: Decreased caregiver support    Equipment recommendations: Equipment Needed After Discharge:  (defer to SNF)     GOALS: see general POC   Physical Therapy Goals        Problem: Physical Therapy Goal    Goal Priority Disciplines Outcome Goal Variances  Interventions   Physical Therapy Goal     PT/OT, PT Ongoing (interventions implemented as appropriate)     Description:  Goals to be met by: 17     Patient will increase functional independence with mobility by performin. Supine <> sit with Maximum Assistance  2. Sit to stand transfer with Moderate Assistance MET 17  3. Bed to chair transfer with Moderate Assistance using Rolling Walker  4. Gait  x 5 feet with Minimal Assistance using Rolling Walker.   5. Sitting at edge of bed x 15 minutes with Stand-by Assistance MET 17  6. Lower extremity exercise program x 10 reps per handout, with assistance as needed               Problem: Physical Therapy Goal    Goal Priority Disciplines Outcome Goal Variances Interventions   Physical Therapy Goal     PT/OT, PT                      PLAN:    Patient to be seen 6 x/week  to address the above listed problems via gait training, therapeutic activities, therapeutic exercises  Plan of Care expires: 17  Plan of Care reviewed with: patient, spouse         Rickey Hernandez, PTA  2017

## 2017-12-01 NOTE — PROGRESS NOTES
[facility] Hospital Medicine  Progress Note    Patient Name: Sung Buckley  MRN: [id number]  Patient Class: IP- Inpatient   Admission Date: 11/22/2017  Length of Stay: 9 days  Attending Physician: Jack White MD  Primary Care Provider: Maren Monterroso MD        Subjective:     Principal Problem:Closed subcapital fracture of right femur with routine healing    HPI:  Sung Buckley is a 72 y.o. white man with seizure disorder, hyperlipidemia, Alzheimer's and vascular dementia, dysphagia, neuropathy, and depression.  He lives in [place], [place].  He is .  His primary care physician is Dr. Maren Monterroso.  His neurologist is Dr. Bernardo Carrera.     He was taken to [facility] on 11/22/17 with right hip pain after a fall at home.  His wife reported that she was cooking breakfast when she heard him calling out for her and complaining of pain.  He sleeps in a hospital bed and the rails were up at the time.  She found him on the floor and he said that he was trying to get up to go to the bathroom.  She said that he normally calls for her assistance, but he did not this time.  He has a walker and wheelchair that he uses to get around the house with and the wheelchair was beside his bed, but he was not using it at this time.  He denied any loss of consciousness, light headedness, palpitations or chest pain prior to the fall.  He complained of pain in the right hip that is constant, sharp, and 8/10 in intensity.     Hospital Course:  He required supplemental oxygen on admission and had fever overnight the first night prior to going to the operating room.  He had right hemiarthroplasty by Dr. Abimael Hallman on 11/23/17 and was monitored in the ICU postoperatively.  He was started on piperacillin-tazobactam for possible aspiration pneumonia.  Respiratory status worsened and required comfort flow at 85% FiO2.  CTA chest was negative for pulmonary embolism, but did show  multilobar opacities predominantly in the lower lobes that likely represent pneumonia vs pneumonitis.  Vancomycin was added as he continued to have recurrent fever.  Modified barium swallow study was performed on 11/27/17 and dental soft diet with nectar thickened liquids was recommended.  His wife noticed him to be increasingly somnolent.  ABG on 11/29/17 showed hypoxia but no hypercapnia.  Follow-up chest x-ray showed no change.  His wife noticed oral thrush and observed him reaching for parts of his body as if he was in pain.  He was given ketorolac and rested afterward.  On the night of 11/30/17 his wife noted that he did not have restful sleep.  He had not had delirium precautions in place, either.    Interval History: His wife reported that he was fully awake this morning when their daughter visited, but afterward was somnolent again.  Last night he was fidgety and had restless sleep.    Review of Systems   Unable to perform ROS: Dementia     Objective:     Vital Signs (Most Recent):  Temp: 98.4 °F (36.9 °C) (12/01/17 0758)  Pulse: 72 (12/01/17 0814)  Resp: 16 (12/01/17 0814)  BP: 135/65 (12/01/17 0758)  SpO2: (!) 94 % (12/01/17 0814) Vital Signs (24h Range):  Temp:  [98.1 °F (36.7 °C)-100 °F (37.8 °C)] 98.4 °F (36.9 °C)  Pulse:  [65-83] 72  Resp:  [16-20] 16  SpO2:  [92 %-97 %] 94 %  BP: (128-137)/(64-68) 135/65     Weight: 89.9 kg (198 lb 3.1 oz)  Body mass index is 27.64 kg/m².    Intake/Output Summary (Last 24 hours) at 12/01/17 0976  Last data filed at 12/01/17 0411   Gross per 24 hour   Intake              850 ml   Output                0 ml   Net              850 ml      Physical Exam   Constitutional: He appears well-developed and well-nourished. No distress. Nasal cannula in place.   Pulmonary/Chest: Effort normal. No respiratory distress. He has rhonchi.   Musculoskeletal: He exhibits no edema or tenderness.   Neurological: He is alert. He is disoriented.   Psychiatric: His affect is blunt. Cognition  and memory are impaired.   Nursing note and vitals reviewed.      Significant Labs:   CBC:     Recent Labs  Lab 11/29/17  1120 12/01/17  0401   WBC  --  8.42   HGB  --  11.0*   HCT 33* 33.6*   PLT  --  470*     CMP:     Recent Labs  Lab 11/30/17  0740 12/01/17  0401    138   K 3.8 3.8    107   CO2 24 23   * 171*   BUN 18 16   CREATININE 0.9 0.9   CALCIUM 8.8 8.6*   ANIONGAP 10 8   EGFRNONAA >60 >60     Magnesium:     Recent Labs  Lab 11/30/17  0740 12/01/17  0401   MG 2.1 2.0       Significant Imaging: I have reviewed all pertinent imaging results/findings within the past 24 hours.      Assessment/Plan:      * Closed subcapital fracture of right femur with routine healing    S/p right hip hemiarthroplasty  Fall  Dr. Hallman performed hemiarthroplasty 11/23/17. Turn q2. Non-opiates for pain control. Docusate BID. Lovenox for DVT ppx. PT/OT. Will need SNF placement once respiratory status is improved.         Rash of back    Due to lying in bed too much.  Also has impressions of bed sheet creases all over his back.          Oral thrush    Started fluconazole on 11/30/17.          Somnolence    Likely due to disrupted sleep/wake cycle, so give quetiapine 12.5 mg tonight.  Also has seizure disorder so EEG to rule out seizure activity.          Aspiration pneumonia of both lungs    Acute respiratory failure with hypoxia  CTA with multilobar infiltrates.  Blood cultures are NGTD.  Finished 8 days of piperacillin-tazobactam.  Continue vancomycin (day 6). Wean oxygen as able.  Echo with normal LVEF but with diastolic dysfunction.         Seizure disorder    Continue levetiracetam at 250 mg TID.  EEG to evaluate if his somnolence is due to seizures.        Iron deficiency anemia secondary to inadequate dietary iron intake    Ferritin was 16 in 2016. Continue iron supplement.           Oropharyngeal dysphagia    Appreciate SLP assistance.  Dental soft diet with nectar thickened liquids.          Polyneuropathy    Gait disorder  Continue  folic acid and B12. PT/OT post-operatively. Hold gabapentin for now.         Depression with anxiety    Continue citalopram.           Mixed Alzheimer's and vascular dementia    Continue donepezil and memantine.  At high risk for delirium.  Start delirium precautions.          Mixed hyperlipidemia    Continue rosuvastatin and gemfibrozil.             VTE Risk Mitigation         Ordered     enoxaparin injection 30 mg  Daily     Route:  Subcutaneous        11/23/17 1242     Place sequential compression device  Until discontinued      11/23/17 1242     High Risk of VTE  Once      11/23/17 1242              Jack White MD  Department of Hospital Medicine   [facility]

## 2017-12-01 NOTE — PLAN OF CARE
Problem: Patient Care Overview  Goal: Plan of Care Review  Outcome: Ongoing (interventions implemented as appropriate)  Patient on HFNC .  Will wean as tolerated.  Will continue to monitor.

## 2017-12-01 NOTE — PLAN OF CARE
Problem: Physical Therapy Goal  Goal: Physical Therapy Goal  Goals to be met by: 17     Patient will increase functional independence with mobility by performin. Supine <> sit with Maximum Assistance  2. Sit to stand transfer with Moderate Assistance MET 17  3. Bed to chair transfer with Moderate Assistance using Rolling Walker  4. Gait  x 5 feet with Minimal Assistance using Rolling Walker.   5. Sitting at edge of bed x 15 minutes with Stand-by Assistance MET 17  6. Lower extremity exercise program x 10 reps per handout, with assistance as needed      Outcome: Ongoing (interventions implemented as appropriate)  Goal 2 met

## 2017-12-01 NOTE — PLAN OF CARE
Problem: SLP Goal  Goal: SLP Goal  SLP Short Term Goals:  1. Patient will successfully participate in ongoing clinical swallow evaluation and tolerate po trials with no overt s/s of aspiration. --MET 11/27  2. Pt will safely consume full liquid diet w/ NECTAR thickened liquids w/ no overt s/s of aspiration at supervision level.-discontinue   3. Pt will successfully participate in Modified Barium Swallow study to radiographically assess swallow function, rule out aspiration and determine safest/least restrictive diet.  -MET 11/27  4. Pt will tolerate dental soft/finely chopped meats/thin liquids with no overt s/s of aspiration. -ongoing   Outcome: Ongoing (interventions implemented as appropriate)  Pt with minimal po intake at breakfast, he is waiting for lunch tray and reports no appetite.

## 2017-12-01 NOTE — PLAN OF CARE
Problem: Patient Care Overview  Goal: Plan of Care Review  Outcome: Revised  Patient arouses to voice.  Answers questions appropriately.  Wife at bedside.  Patient is incontinent of bowel and bladder and wears the diapers.  O2 in use.  Worked with PT/OT.  Continues to receive antibiotics.  Safety maintained.  Will continue to monitor.

## 2017-12-01 NOTE — PT/OT/SLP PROGRESS
Occupational Therapy  Treatment    Sung Buckley   MRN: [id number]   Admitting Diagnosis: Closed subcapital fracture of right femur with routine healing    OT Date of Treatment: 12/01/17   OT Start Time: 1312  OT Stop Time: 1342  OT Total Time (min): 30 min    Billable Minutes:  Self Care/Home Management 15 co treatment with PT     General Precautions: Standard, fall  Orthopedic Precautions: RLE weight bearing as tolerated, RLE posterior precautions  Braces:           Subjective:  Communicated with nsg prior to session.  Much more alert during session this date     Pain/Comfort  Pain Rating 1:  (did not rate/complain of pain; did state ouch when being turned)  Location - Side 1: Right  Location - Orientation 1: generalized  Location 1: hip    Objective:        Functional Mobility:  Bed Mobility:  Rolling/Turning to Left: Maximum assistance  Rolling/Turning Right: Maximum assistance  Supine to Sit: With assist of 2, Maximum Assistance  Sit to Supine: Maximum Assistance, With assist of 2    Transfers:   Sit <> Stand Assistance: Minimum Assistance (of 2 with RW from EOB; 2 trials during session with bed height elevated)  Sit <> Stand Assistive Device: Rolling Walker    Functional Ambulation: could not perform; pt unable to bear weight on RLE long enough to unweight LLE to step     Activities of Daily Living:    Grooming Position: EOB  Grooming Level of Assistance: Minimum assistance  Toileting Where Assessed: Bed level  Toileting Level of Assistance: Total assistance      Balance:   Static Sit: FAIR: Maintains without assist, but unable to take any challenges   Dynamic Sit: FAIR: Cannot move trunk without losing balance  Static Stand: POOR+: Needs MINIMAL assist to maintain  Dynamic stand: POOR: N/A    Therapeutic Activities and Exercises:  Functional transitions from EOB x 2 trials; bed mobility as listed above; increased independence     AM-PAC 6 CLICK ADL   How much help from another person does this patient currently  "need?   1 = Unable, Total/Dependent Assistance  2 = A lot, Maximum/Moderate Assistance  3 = A little, Minimum/Contact Guard/Supervision  4 = None, Modified [place]/Independent    Putting on and taking off regular lower body clothing? : 1  Bathing (including washing, rinsing, drying)?: 1  Toileting, which includes using toilet, bedpan, or urinal? : 1  Putting on and taking off regular upper body clothing?: 2  Taking care of personal grooming such as brushing teeth?: 2  Eating meals?: 2  Total Score: 9     AM-PAC Raw Score CMS "G-Code Modifier Level of Impairment Assistance   6 % Total / Unable   7 - 8 CM 80 - 100% Maximal Assist   9-13 CL 60 - 80% Moderate Assist   14 - 19 CK 40 - 60% Moderate Assist   20 - 22 CJ 20 - 40% Minimal Assist   23 CI 1-20% SBA / CGA   24 CH 0% Independent/ Mod I       Patient left supine with all lines intact, call button in reach, bed alarm on, nsg notified and spouse  present    ASSESSMENT:  Sung Buckley is a 72 y.o. male with a medical diagnosis of Closed subcapital fracture of right femur with routine healing. Pt with increased alertness for participation this date. Cont OT POC     Rehab identified problem list/impairments: Rehab identified problem list/impairments: weakness, gait instability, impaired endurance, impaired balance, impaired self care skills, impaired functional mobilty, edema, impaired cognition, pain, orthopedic precautions, impaired skin, decreased safety awareness, decreased lower extremity function    Rehab potential is good.    Activity tolerance: Fair + increased this date     Discharge recommendations: Discharge Facility/Level Of Care Needs: nursing facility, skilled     Barriers to discharge: Barriers to Discharge: Decreased caregiver support    Equipment recommendations:  (per SNF)     GOALS:    Occupational Therapy Goals        Problem: Occupational Therapy Goal    Goal Priority Disciplines Outcome Interventions   Occupational Therapy Goal     OT, " PT/OT Ongoing (interventions implemented as appropriate)    Description:  Goals to be met by: 12/24     Patient will increase functional independence with ADLs by performing:    Feeding with Set-up Assistance. --MET 11/28  Grooming while seated with Minimal Assistance.  Sitting at edge of bed x10 minutes with Moderate Assistance. --MET 11/27  Rolling to Bilateral with Moderate Assistance.   Supine to sit with Maximum Assistance.  Toilet transfer to bedside commode with Maximum Assistance.                        Plan:  Patient to be seen 5 x/week to address the above listed problems via self-care/home management, therapeutic activities, therapeutic exercises  Plan of Care expires: 12/24/17  Plan of Care reviewed with: patient, spouse         Elina Baxter, OT  12/01/2017

## 2017-12-02 PROBLEM — R21 RASH OF BACK: Status: RESOLVED | Noted: 2017-11-30 | Resolved: 2017-12-02

## 2017-12-02 LAB — VANCOMYCIN TROUGH SERPL-MCNC: 10.5 UG/ML

## 2017-12-02 PROCEDURE — 25000003 PHARM REV CODE 250: Performed by: HOSPITALIST

## 2017-12-02 PROCEDURE — 94664 DEMO&/EVAL PT USE INHALER: CPT

## 2017-12-02 PROCEDURE — 80202 ASSAY OF VANCOMYCIN: CPT

## 2017-12-02 PROCEDURE — 94761 N-INVAS EAR/PLS OXIMETRY MLT: CPT

## 2017-12-02 PROCEDURE — 11000001 HC ACUTE MED/SURG PRIVATE ROOM

## 2017-12-02 PROCEDURE — 63600175 PHARM REV CODE 636 W HCPCS: Performed by: HOSPITALIST

## 2017-12-02 PROCEDURE — 27100171 HC OXYGEN HIGH FLOW UP TO 24 HOURS

## 2017-12-02 PROCEDURE — 63600175 PHARM REV CODE 636 W HCPCS

## 2017-12-02 PROCEDURE — 97116 GAIT TRAINING THERAPY: CPT

## 2017-12-02 PROCEDURE — 99900035 HC TECH TIME PER 15 MIN (STAT)

## 2017-12-02 PROCEDURE — 27100092 HC HIGH FLOW DELIVERY CANNULA

## 2017-12-02 PROCEDURE — 97530 THERAPEUTIC ACTIVITIES: CPT

## 2017-12-02 PROCEDURE — 36415 COLL VENOUS BLD VENIPUNCTURE: CPT

## 2017-12-02 PROCEDURE — 97535 SELF CARE MNGMENT TRAINING: CPT

## 2017-12-02 PROCEDURE — 92526 ORAL FUNCTION THERAPY: CPT

## 2017-12-02 RX ORDER — LEVETIRACETAM 750 MG/1
750 TABLET ORAL 2 TIMES DAILY
Status: DISCONTINUED | OUTPATIENT
Start: 2017-12-02 | End: 2017-12-07 | Stop reason: HOSPADM

## 2017-12-02 RX ORDER — ENOXAPARIN SODIUM 100 MG/ML
40 INJECTION SUBCUTANEOUS EVERY 24 HOURS
Status: DISCONTINUED | OUTPATIENT
Start: 2017-12-03 | End: 2017-12-07 | Stop reason: HOSPADM

## 2017-12-02 RX ORDER — LEVETIRACETAM 500 MG/1
500 TABLET ORAL ONCE
Status: COMPLETED | OUTPATIENT
Start: 2017-12-02 | End: 2017-12-02

## 2017-12-02 RX ORDER — IBUPROFEN 600 MG/1
600 TABLET ORAL EVERY 6 HOURS PRN
Status: DISCONTINUED | OUTPATIENT
Start: 2017-12-02 | End: 2017-12-07 | Stop reason: HOSPADM

## 2017-12-02 RX ADMIN — LEVETIRACETAM 750 MG: 750 TABLET ORAL at 11:12

## 2017-12-02 RX ADMIN — FLUCONAZOLE 100 MG: 100 TABLET ORAL at 08:12

## 2017-12-02 RX ADMIN — KETOROLAC TROMETHAMINE 10 MG: 10 TABLET, FILM COATED ORAL at 06:12

## 2017-12-02 RX ADMIN — DONEPEZIL HYDROCHLORIDE 10 MG: 5 TABLET, FILM COATED ORAL at 06:12

## 2017-12-02 RX ADMIN — LEVETIRACETAM 250 MG: 250 TABLET, FILM COATED ORAL at 06:12

## 2017-12-02 RX ADMIN — PANTOPRAZOLE SODIUM 40 MG: 40 TABLET, DELAYED RELEASE ORAL at 08:12

## 2017-12-02 RX ADMIN — FOLIC ACID 1 MG: 1 TABLET ORAL at 11:12

## 2017-12-02 RX ADMIN — LEVETIRACETAM 500 MG: 500 TABLET ORAL at 11:12

## 2017-12-02 RX ADMIN — LEVOTHYROXINE SODIUM 25 MCG: 25 TABLET ORAL at 06:12

## 2017-12-02 RX ADMIN — FERROUS SULFATE TAB EC 325 MG (65 MG FE EQUIVALENT) 325 MG: 325 (65 FE) TABLET DELAYED RESPONSE at 08:12

## 2017-12-02 RX ADMIN — VANCOMYCIN HYDROCHLORIDE 1250 MG: 1 INJECTION, POWDER, LYOPHILIZED, FOR SOLUTION INTRAVENOUS at 03:12

## 2017-12-02 RX ADMIN — POTASSIUM & SODIUM PHOSPHATES POWDER PACK 280-160-250 MG 2 PACKET: 280-160-250 PACK at 12:12

## 2017-12-02 RX ADMIN — ACETAMINOPHEN 650 MG: 325 TABLET ORAL at 11:12

## 2017-12-02 RX ADMIN — POTASSIUM & SODIUM PHOSPHATES POWDER PACK 280-160-250 MG 2 PACKET: 280-160-250 PACK at 04:12

## 2017-12-02 RX ADMIN — POTASSIUM & SODIUM PHOSPHATES POWDER PACK 280-160-250 MG 2 PACKET: 280-160-250 PACK at 11:12

## 2017-12-02 RX ADMIN — FERROUS SULFATE TAB EC 325 MG (65 MG FE EQUIVALENT) 325 MG: 325 (65 FE) TABLET DELAYED RESPONSE at 04:12

## 2017-12-02 RX ADMIN — FERROUS SULFATE TAB EC 325 MG (65 MG FE EQUIVALENT) 325 MG: 325 (65 FE) TABLET DELAYED RESPONSE at 12:12

## 2017-12-02 RX ADMIN — MEMANTINE HYDROCHLORIDE 10 MG: 5 TABLET ORAL at 11:12

## 2017-12-02 RX ADMIN — VANCOMYCIN HYDROCHLORIDE 1000 MG: 1 INJECTION, POWDER, LYOPHILIZED, FOR SOLUTION INTRAVENOUS at 02:12

## 2017-12-02 RX ADMIN — GEMFIBROZIL 600 MG: 600 TABLET ORAL at 08:12

## 2017-12-02 RX ADMIN — FOLIC ACID 1 MG: 1 TABLET ORAL at 08:12

## 2017-12-02 RX ADMIN — POTASSIUM & SODIUM PHOSPHATES POWDER PACK 280-160-250 MG 2 PACKET: 280-160-250 PACK at 08:12

## 2017-12-02 RX ADMIN — ENOXAPARIN SODIUM 30 MG: 100 INJECTION SUBCUTANEOUS at 08:12

## 2017-12-02 NOTE — PT/OT/SLP PROGRESS
Physical Therapy  Treatment    Sung Buckley   MRN: [id number]   Admitting Diagnosis: Closed subcapital fracture of right femur with routine healing    PT Received On: 12/02/17  PT Start Time: 0905     PT Stop Time: 0930    PT Total Time (min): 25 min       Billable Minutes:  Gait [id number] and Therapeutic Activity 15    Treatment Type: Treatment  PT/PTA: PTA     PTA Visit Number: 3       General Precautions: Standard, fall  Orthopedic Precautions: RLE weight bearing as tolerated, RLE posterior precautions   Braces:      Do you have any cultural, spiritual, Muslim conflicts, given your current situation?: none reported to PT    Subjective:  Communicated with NSG prior to session.  Pt's wife reports his lack of sleep last night has made him very drowsy. Pt with some difficulty to arouse initially. Able to verbalize and signal pain in R lateral hip.     Pain/Comfort  Location - Side 1: Right  Location - Orientation 1: lateral  Location 1: hip  Pain Addressed 1: Distraction    Objective:   Patient found with: oxygen, peripheral IV, SCD, telemetry    Functional Mobility:  Bed Mobility:   Rolling/Turning Right: Maximum assistance  Scooting/Bridging: Moderate Assistance  Sit to Supine: Maximum Assistance, Moderate Assistance    Transfers:  Sit <> Stand Assistance: Minimum Assistance  Sit <> Stand Assistive Device: Rolling Walker    Gait:   Gait Distance: Pt able to take 3 steps x2 trials w/ RW to left side with max verbal instruction and encouragement.  Assistance 1: Moderate assistance  Gait Assistive Device: Rolling walker    Stairs:      Balance:   Static Sit: FAIR+: Able to take MINIMAL challenges from all directions  Dynamic Sit: 0: N/A  Static Stand: FAIR: Maintains without assist but unable to take challenges  Dynamic stand:      Therapeutic Activities and Exercises:  Pt able to take lateral side steps in RW, but required max encouragement and tactile commands. Pt presented with some forward flexed trunk (able to  correct with VC's/ TC's). 2 trials of sit to stand from edge of bed, total of 20 min sitting tolerance with no assistance for balance    AM-PAC 6 CLICK MOBILITY  How much help from another person does this patient currently need?   1 = Unable, Total/Dependent Assistance  2 = A lot, Maximum/Moderate Assistance  3 = A little, Minimum/Contact Guard/Supervision  4 = None, Modified [place]/Independent         AM-PAC Raw Score CMS G-Code Modifier Level of Impairment Assistance   6 % Total / Unable   7 - 9 CM 80 - 100% Maximal Assist   10 - 14 CL 60 - 80% Moderate Assist   15 - 19 CK 40 - 60% Moderate Assist   20 - 22 CJ 20 - 40% Minimal Assist   23 CI 1-20% SBA / CGA   24 CH 0% Independent/ Mod I     Patient left supine with all lines intact, call button in reach, bed alarm on and MD present.    Assessment:  Sung Buckley is a 72 y.o. male with a medical diagnosis of Closed subcapital fracture of right femur with routine healing. Pt drowsy throughout PT session, attempted to return to bed multiple attempts. Pt's wife present throughout session today.     Rehab identified problem list/impairments: Rehab identified problem list/impairments: weakness, gait instability, decreased upper extremity function, decreased ROM, impaired endurance, impaired self care skills, impaired functional mobilty, decreased coordination, pain, impaired cognition, decreased safety awareness, decreased lower extremity function, impaired balance    Rehab potential is good.    Activity tolerance: Fair    Discharge recommendations: Discharge Facility/Level Of Care Needs: nursing facility, skilled     Barriers to discharge: Barriers to Discharge: Decreased caregiver support    Equipment recommendations:       GOALS:    Physical Therapy Goals        Problem: Physical Therapy Goal    Goal Priority Disciplines Outcome Goal Variances Interventions   Physical Therapy Goal     PT/OT, PT Ongoing (interventions implemented as appropriate)      Description:  Goals to be met by: 17     Patient will increase functional independence with mobility by performin. Supine <> sit with Maximum Assistance  2. Sit to stand transfer with Moderate Assistance MET 17  3. Bed to chair transfer with Moderate Assistance using Rolling Walker  4. Gait  x 5 feet with Minimal Assistance using Rolling Walker.   5. Sitting at edge of bed x 15 minutes with Stand-by Assistance MET 17  6. Lower extremity exercise program x 10 reps per handout, with assistance as needed               Problem: Physical Therapy Goal    Goal Priority Disciplines Outcome Goal Variances Interventions   Physical Therapy Goal     PT/OT, PT Ongoing (interventions implemented as appropriate)                     PLAN:    Patient to be seen 6 x/week  to address the above listed problems via gait training, therapeutic activities, therapeutic exercises  Plan of Care expires: 17  Plan of Care reviewed with: patient, spouse         Parker Rodriguez, PTA  2017

## 2017-12-02 NOTE — SUBJECTIVE & OBJECTIVE
Interval History: Quetiapine did not have the desired effect.  His problems seems to be that he wants to stay in bed, likely due to pain.  He lifts his head up and looks around frequently, so is awake enough.    Review of Systems   Unable to perform ROS: Dementia     Objective:     Vital Signs (Most Recent):  Temp: 98.8 °F (37.1 °C) (12/02/17 0811)  Pulse: 66 (12/02/17 0811)  Resp: 16 (12/02/17 0811)  BP: 121/69 (12/02/17 0811)  SpO2: (!) 94 % (12/02/17 0748) Vital Signs (24h Range):  Temp:  [96.5 °F (35.8 °C)-98.8 °F (37.1 °C)] 98.8 °F (37.1 °C)  Pulse:  [62-86] 66  Resp:  [16-20] 16  SpO2:  [92 %-96 %] 94 %  BP: (121-155)/(69-75) 121/69     Weight: 89.9 kg (198 lb 3.1 oz)  Body mass index is 27.65 kg/m².    Intake/Output Summary (Last 24 hours) at 12/02/17 1014  Last data filed at 12/01/17 1417   Gross per 24 hour   Intake              250 ml   Output                0 ml   Net              250 ml      Physical Exam   Constitutional: He appears well-developed and well-nourished. No distress. Nasal cannula in place.   Pulmonary/Chest: Effort normal. No respiratory distress. He has rhonchi.   Musculoskeletal: He exhibits no edema or tenderness.   Neurological: He is alert. He is disoriented.   Psychiatric: His affect is blunt. Cognition and memory are impaired.   Nursing note and vitals reviewed.      Significant Labs:   CBC:     Recent Labs  Lab 12/01/17  0401   WBC 8.42   HGB 11.0*   HCT 33.6*   *     CMP:     Recent Labs  Lab 12/01/17  0401      K 3.8      CO2 23   *   BUN 16   CREATININE 0.9   CALCIUM 8.6*   ANIONGAP 8   EGFRNONAA >60     Magnesium:     Recent Labs  Lab 12/01/17  0401   MG 2.0       Significant Imaging: I have reviewed all pertinent imaging results/findings within the past 24 hours.   Electroencephalogram 12/01/17: EEG FINDINGS:  The background of this study contains mixed frequency activity   primarily in the alpha and beta frequency range.  A 9 Hz posterior dominant  rhythm is seen in the occipital channels intermittently.  There is adequate variability throughout the study.  Towards the end of the record, central beta activity is seen and eventually sleep spindles signifying stage N2 sleep, is briefly seen.  The patient then arouses and returns to baseline prior to the end of the study.   No epileptiform discharges were seen at any point.  No focal features were seen at any point.  There were occasional leg jerks noted by the technician, which resulted in artifact on the EEG, but no epileptiform features were seen.   INTERPRETATION:  Normal awake and asleep EEG.

## 2017-12-02 NOTE — ASSESSMENT & PLAN NOTE
He appears to be awake enough, and participates with PT/OT, but seems to limit himself, maybe due to pain.  Treat pain.

## 2017-12-02 NOTE — PLAN OF CARE
Problem: Patient Care Overview  Goal: Plan of Care Review  Outcome: Ongoing (interventions implemented as appropriate)  Pt received on comfort flow nasal cannula at  15  Lpm and 35% FiO2.  SPO2   94%.  Pt in no apparent respiratory distress.  Will continue to monitor.

## 2017-12-02 NOTE — PLAN OF CARE
Problem: Physical Therapy Goal  Goal: Physical Therapy Goal  Outcome: Ongoing (interventions implemented as appropriate)  Pt with limited participation due to drowsiness. However pt able to take lateral sidesteps with RW and max encouragement. Progress towards established PT goals.

## 2017-12-02 NOTE — PT/OT/SLP PROGRESS
Speech Language Pathology  Treatment    Sung Buckley   MRN: [id number]   Admitting Diagnosis: Closed subcapital fracture of right femur with routine healing    Diet recommendations: Solid Diet Level: Dental Soft, Finely chopped meat  Liquid Diet Level: Nectar Thick   · Upright for meals  · Add thickener for all liquids for NECTAR thick liquids  · No jello, no thin liquids and no plain soups add thickener  · Crush oral meds and encourage boost with meals    SLP Treatment Date: 12/02/17  Speech Start Time: 1140     Speech Stop Time: 1200     Speech Total (min): 20 min       TREATMENT BILLABLE MINUTES:  Treatment Swallowing Dysfunction 10 minutes and Seld Care/Home Management Training 10 minutes    Has the patient been evaluated by SLP for swallowing? : Yes  Keep patient NPO?: No   General Precautions: Standard, fall  Current Respiratory Status: nasal cannula       Subjective:  Pt asleep in bed as ST entered room. Spouse at bedside reports no swallowing difficulties with current diet (dental soft/finely chopped with NECTAR thickened liquids). Improvements noted in PO intake per calorie count.    Objective:   Patient found with: oxygen, peripheral IV, SCD, telemetry    Pt was observed to be lethargic throughout session and required mod-max cuing from SLP (auditory and tactile) to participate. Pt tolerated nectar thick liquids x3, puree x4, and dental soft textures x2. Provided extensive skilled education to pt's spouse at bedside re: SLP tx plan, swallow precautions, and diet modifications.    Assessment:  Sung Buckley is a 72 y.o. male with a medical diagnosis of Closed subcapital fracture of right femur with routine healing and presents with presents with oral and pharyngeal dysphagia, poor po intake, calorie count in progress.    Discharge recommendations: Discharge Facility/Level Of Care Needs: nursing facility, skilled     Goals:    SLP Goals        Problem: SLP Goal    Goal Priority Disciplines Outcome   SLP Goal      SLP Ongoing (interventions implemented as appropriate)   Description:  SLP Short Term Goals:  1. Patient will successfully participate in ongoing clinical swallow evaluation and tolerate po trials with no overt s/s of aspiration. --MET 11/27  2. Pt will safely consume full liquid diet w/ NECTAR thickened liquids w/ no overt s/s of aspiration at supervision level.-discontinue   3. Pt will successfully participate in Modified Barium Swallow study to radiographically assess swallow function, rule out aspiration and determine safest/least restrictive diet.  -MET 11/27  4. Pt will tolerate dental soft/finely chopped meats/thin liquids with no overt s/s of aspiration. -ongoing              Multidisciplinary Problems (Resolved)        Problem: SLP Goal    Goal Priority Disciplines Outcome   SLP Goal   (Resolved)     SLP  Error                    Plan:   Patient to be seen Therapy Frequency: 3 x/week   Plan of Care expires: 12/24/17  Plan of Care reviewed with: patient, spouse  SLP Follow-up?: Yes  SLP - Next Visit Date: 12/03/17           Jessica Franco CCC-SLP  12/02/2017

## 2017-12-02 NOTE — PROCEDURES
This is an inpatient study done at [facility]    DATE OF STUDY:  12/01/2017    Duration is 26 minutes and 44 seconds.    EEG FINDINGS:  The background of this study contains mixed frequency activity   primarily in the alpha and beta frequency range.  A 9 Hz posterior dominant   rhythm is seen in the occipital channels intermittently.  There is adequate   variability throughout the study.  Towards the end of the record, central beta   activity is seen and eventually sleep spindles signifying stage N2 sleep, is   briefly seen.  The patient then arouses and returns to baseline prior to the end   of the study.    No epileptiform discharges were seen at any point.  No focal features were seen   at any point.  There were occasional leg jerks noted by the technician, which   resulted in artifact on the EEG, but no epileptiform features were seen.    INTERPRETATION:  Normal awake and asleep EEG.      NBB/MADAY  dd: 12/01/2017 15:51:06 (CST)  td: 12/02/2017 03:55:14 (CST)  Doc ID   #[id number]  Job ID #[id number]    CC:

## 2017-12-02 NOTE — ASSESSMENT & PLAN NOTE
Increase levetiracetam 250 mg TID to 750 mg BID per Neurology recommendations.  EEG on 12/1/17 was normal.

## 2017-12-02 NOTE — PROGRESS NOTES
[facility] Hospital Medicine  Progress Note    Patient Name: Sung Buckley  MRN: [id number]  Patient Class: IP- Inpatient   Admission Date: 11/22/2017  Length of Stay: 10 days  Attending Physician: Jack White MD  Primary Care Provider: Maren Monterroso MD        Subjective:     Principal Problem:Closed subcapital fracture of right femur with routine healing    HPI:  Sung Buckley is a 72 y.o. white man with seizure disorder, hyperlipidemia, Alzheimer's and vascular dementia, dysphagia, neuropathy, and depression.  He lives in [place], [place].  He is .  His primary care physician is Dr. Maren Monterroso.  His neurologist is Dr. Bernardo Carrera.     He was taken to [facility] on 11/22/17 with right hip pain after a fall at home.  His wife reported that she was cooking breakfast when she heard him calling out for her and complaining of pain.  He sleeps in a hospital bed and the rails were up at the time.  She found him on the floor and he said that he was trying to get up to go to the bathroom.  She said that he normally calls for her assistance, but he did not this time.  He has a walker and wheelchair that he uses to get around the house with and the wheelchair was beside his bed, but he was not using it at this time.  He denied any loss of consciousness, light headedness, palpitations or chest pain prior to the fall.  He complained of pain in the right hip that is constant, sharp, and 8/10 in intensity.     Hospital Course:  He required supplemental oxygen on admission and had fever overnight the first night prior to going to the operating room.  He had right hemiarthroplasty by Dr. Abimael Hallman on 11/23/17 and was monitored in the ICU postoperatively.  He was started on piperacillin-tazobactam for possible aspiration pneumonia.  Respiratory status worsened and required comfort flow at 85% FiO2.  CTA chest was negative for pulmonary embolism, but did show  multilobar opacities predominantly in the lower lobes that likely represent pneumonia vs pneumonitis.  Vancomycin was added as he continued to have recurrent fever.  Modified barium swallow study was performed on 11/27/17 and dental soft diet with nectar thickened liquids was recommended.  His wife noticed him to be increasingly somnolent.  ABG on 11/29/17 showed hypoxia but no hypercapnia.  Follow-up chest x-ray showed no change.  His wife noticed oral thrush and observed him reaching for parts of his body as if he was in pain.  He was given ketorolac and rested afterward.  On the night of 11/30/17 his wife noted that he did not have restful sleep.  He had not had delirium precautions in place, either, which were started.  Neurology was consulted and his wife told them that he has episodes of staring at home.  They recommended increasing his levetiracetam dose.  EEG showed normal brain activity.  Quetiapine 12.5 mg was tried at night, but it caused agitation.  He participated with Physical/Occupational Therapy but wanted to get back into the bed.  His wife reported that ibuprofen works well for his pain at home.    Interval History: Quetiapine did not have the desired effect.  His problems seems to be that he wants to stay in bed, likely due to pain.  He lifts his head up and looks around frequently, so is awake enough.    Review of Systems   Unable to perform ROS: Dementia     Objective:     Vital Signs (Most Recent):  Temp: 98.8 °F (37.1 °C) (12/02/17 0811)  Pulse: 66 (12/02/17 0811)  Resp: 16 (12/02/17 0811)  BP: 121/69 (12/02/17 0811)  SpO2: (!) 94 % (12/02/17 0748) Vital Signs (24h Range):  Temp:  [96.5 °F (35.8 °C)-98.8 °F (37.1 °C)] 98.8 °F (37.1 °C)  Pulse:  [62-86] 66  Resp:  [16-20] 16  SpO2:  [92 %-96 %] 94 %  BP: (121-155)/(69-75) 121/69     Weight: 89.9 kg (198 lb 3.1 oz)  Body mass index is 27.65 kg/m².    Intake/Output Summary (Last 24 hours) at 12/02/17 1014  Last data filed at 12/01/17 1417   Gross  per 24 hour   Intake              250 ml   Output                0 ml   Net              250 ml      Physical Exam   Constitutional: He appears well-developed and well-nourished. No distress. Nasal cannula in place.   Pulmonary/Chest: Effort normal. No respiratory distress. He has rhonchi.   Musculoskeletal: He exhibits no edema or tenderness.   Neurological: He is alert. He is disoriented.   Psychiatric: His affect is blunt. Cognition and memory are impaired.   Nursing note and vitals reviewed.      Significant Labs:   CBC:     Recent Labs  Lab 12/01/17  0401   WBC 8.42   HGB 11.0*   HCT 33.6*   *     CMP:     Recent Labs  Lab 12/01/17  0401      K 3.8      CO2 23   *   BUN 16   CREATININE 0.9   CALCIUM 8.6*   ANIONGAP 8   EGFRNONAA >60     Magnesium:     Recent Labs  Lab 12/01/17  0401   MG 2.0       Significant Imaging: I have reviewed all pertinent imaging results/findings within the past 24 hours.   Electroencephalogram 12/01/17: EEG FINDINGS:  The background of this study contains mixed frequency activity   primarily in the alpha and beta frequency range.  A 9 Hz posterior dominant rhythm is seen in the occipital channels intermittently.  There is adequate variability throughout the study.  Towards the end of the record, central beta activity is seen and eventually sleep spindles signifying stage N2 sleep, is briefly seen.  The patient then arouses and returns to baseline prior to the end of the study.   No epileptiform discharges were seen at any point.  No focal features were seen at any point.  There were occasional leg jerks noted by the technician, which resulted in artifact on the EEG, but no epileptiform features were seen.   INTERPRETATION:  Normal awake and asleep EEG.      Assessment/Plan:      * Closed subcapital fracture of right femur with routine healing    S/p right hip hemiarthroplasty  Fall  Dr. Hallman performed hemiarthroplasty 11/23/17. Turn q2. Non-opiates for  pain control. Docusate BID. Lovenox for DVT ppx. PT/OT. Will need SNF placement once respiratory status is improved.         Oral thrush    Started fluconazole on 11/30/17.          Somnolence    He appears to be awake enough, and participates with PT/OT, but seems to limit himself, maybe due to pain.  Treat pain.          Aspiration pneumonia of both lungs    Acute respiratory failure with hypoxia  CTA with multilobar infiltrates.  Blood cultures are NGTD.  Finished 8 days of piperacillin-tazobactam.  Continue vancomycin (day 7). Wean oxygen as able.  Echo with normal LVEF but with diastolic dysfunction.         Seizure disorder    Increase levetiracetam 250 mg TID to 750 mg BID per Neurology recommendations.  EEG on 12/1/17 was normal.        Iron deficiency anemia secondary to inadequate dietary iron intake    Ferritin was 16 in 2016. Continue iron supplement.           Oropharyngeal dysphagia    Appreciate SLP assistance.  Dental soft diet with nectar thickened liquids.         Polyneuropathy    Gait disorder  Continue  folic acid and B12. PT/OT post-operatively. Hold gabapentin for now.         Depression with anxiety    Continue citalopram.           Mixed Alzheimer's and vascular dementia    Continue donepezil and memantine.  At high risk for delirium.  Start delirium precautions.          Mixed hyperlipidemia    Continue rosuvastatin and gemfibrozil.             VTE Risk Mitigation         Ordered     enoxaparin injection 30 mg  Daily     Route:  Subcutaneous        11/23/17 1242     Place sequential compression device  Until discontinued      11/23/17 1242     High Risk of VTE  Once      11/23/17 1242              Jack White MD  Department of Hospital Medicine   [facility]

## 2017-12-02 NOTE — ASSESSMENT & PLAN NOTE
Acute respiratory failure with hypoxia  CTA with multilobar infiltrates.  Blood cultures had no growth.  Finished 8 days of piperacillin-tazobactam.  Finish 8 days of vancomycin today. Wean oxygen as able.  Echo with normal LVEF but with diastolic dysfunction.

## 2017-12-03 PROBLEM — R40.0 SOMNOLENCE: Status: RESOLVED | Noted: 2017-11-29 | Resolved: 2017-12-03

## 2017-12-03 LAB — VANCOMYCIN TROUGH SERPL-MCNC: 17.9 UG/ML

## 2017-12-03 PROCEDURE — 97530 THERAPEUTIC ACTIVITIES: CPT

## 2017-12-03 PROCEDURE — 25000003 PHARM REV CODE 250: Performed by: HOSPITALIST

## 2017-12-03 PROCEDURE — 94664 DEMO&/EVAL PT USE INHALER: CPT

## 2017-12-03 PROCEDURE — 99900035 HC TECH TIME PER 15 MIN (STAT)

## 2017-12-03 PROCEDURE — 11000001 HC ACUTE MED/SURG PRIVATE ROOM

## 2017-12-03 PROCEDURE — 94761 N-INVAS EAR/PLS OXIMETRY MLT: CPT

## 2017-12-03 PROCEDURE — 27100171 HC OXYGEN HIGH FLOW UP TO 24 HOURS

## 2017-12-03 PROCEDURE — 36415 COLL VENOUS BLD VENIPUNCTURE: CPT

## 2017-12-03 PROCEDURE — 63600175 PHARM REV CODE 636 W HCPCS: Performed by: HOSPITALIST

## 2017-12-03 PROCEDURE — 80202 ASSAY OF VANCOMYCIN: CPT

## 2017-12-03 PROCEDURE — 97110 THERAPEUTIC EXERCISES: CPT

## 2017-12-03 PROCEDURE — 27100092 HC HIGH FLOW DELIVERY CANNULA

## 2017-12-03 RX ADMIN — LEVETIRACETAM 750 MG: 750 TABLET ORAL at 09:12

## 2017-12-03 RX ADMIN — FERROUS SULFATE TAB EC 325 MG (65 MG FE EQUIVALENT) 325 MG: 325 (65 FE) TABLET DELAYED RESPONSE at 07:12

## 2017-12-03 RX ADMIN — LEVOTHYROXINE SODIUM 25 MCG: 25 TABLET ORAL at 05:12

## 2017-12-03 RX ADMIN — POTASSIUM & SODIUM PHOSPHATES POWDER PACK 280-160-250 MG 2 PACKET: 280-160-250 PACK at 05:12

## 2017-12-03 RX ADMIN — POTASSIUM & SODIUM PHOSPHATES POWDER PACK 280-160-250 MG 2 PACKET: 280-160-250 PACK at 09:12

## 2017-12-03 RX ADMIN — DONEPEZIL HYDROCHLORIDE 10 MG: 5 TABLET, FILM COATED ORAL at 07:12

## 2017-12-03 RX ADMIN — FERROUS SULFATE TAB EC 325 MG (65 MG FE EQUIVALENT) 325 MG: 325 (65 FE) TABLET DELAYED RESPONSE at 11:12

## 2017-12-03 RX ADMIN — FOLIC ACID 1 MG: 1 TABLET ORAL at 09:12

## 2017-12-03 RX ADMIN — MEMANTINE HYDROCHLORIDE 10 MG: 5 TABLET ORAL at 09:12

## 2017-12-03 RX ADMIN — VANCOMYCIN HYDROCHLORIDE 1250 MG: 1 INJECTION, POWDER, LYOPHILIZED, FOR SOLUTION INTRAVENOUS at 05:12

## 2017-12-03 RX ADMIN — GEMFIBROZIL 600 MG: 600 TABLET ORAL at 10:12

## 2017-12-03 RX ADMIN — POTASSIUM & SODIUM PHOSPHATES POWDER PACK 280-160-250 MG 2 PACKET: 280-160-250 PACK at 11:12

## 2017-12-03 RX ADMIN — POTASSIUM & SODIUM PHOSPHATES POWDER PACK 280-160-250 MG 2 PACKET: 280-160-250 PACK at 07:12

## 2017-12-03 RX ADMIN — VANCOMYCIN HYDROCHLORIDE 1250 MG: 1 INJECTION, POWDER, LYOPHILIZED, FOR SOLUTION INTRAVENOUS at 11:12

## 2017-12-03 RX ADMIN — ACETAMINOPHEN 650 MG: 325 TABLET ORAL at 09:12

## 2017-12-03 RX ADMIN — IBUPROFEN 600 MG: 600 TABLET, FILM COATED ORAL at 02:12

## 2017-12-03 RX ADMIN — LEVETIRACETAM 750 MG: 750 TABLET ORAL at 10:12

## 2017-12-03 RX ADMIN — FOLIC ACID 1 MG: 1 TABLET ORAL at 10:12

## 2017-12-03 RX ADMIN — PANTOPRAZOLE SODIUM 40 MG: 40 TABLET, DELAYED RELEASE ORAL at 10:12

## 2017-12-03 RX ADMIN — FLUCONAZOLE 100 MG: 100 TABLET ORAL at 10:12

## 2017-12-03 RX ADMIN — ENOXAPARIN SODIUM 40 MG: 100 INJECTION SUBCUTANEOUS at 11:12

## 2017-12-03 RX ADMIN — FERROUS SULFATE TAB EC 325 MG (65 MG FE EQUIVALENT) 325 MG: 325 (65 FE) TABLET DELAYED RESPONSE at 05:12

## 2017-12-03 NOTE — SUBJECTIVE & OBJECTIVE
Interval History: Day 9 on high flow nasal cannula.    Review of Systems   Unable to perform ROS: Dementia     Objective:     Vital Signs (Most Recent):  Temp: 96.9 °F (36.1 °C) (12/03/17 0804)  Pulse: 68 (12/03/17 0804)  Resp: 18 (12/03/17 0804)  BP: 131/63 (12/03/17 0804)  SpO2: (!) 94 % (12/03/17 0739) Vital Signs (24h Range):  Temp:  [96.9 °F (36.1 °C)-98.4 °F (36.9 °C)] 96.9 °F (36.1 °C)  Pulse:  [65-70] 68  Resp:  [18] 18  SpO2:  [93 %-96 %] 94 %  BP: (115-134)/(56-78) 131/63     Weight: 88.8 kg (195 lb 12.3 oz)  Body mass index is 27.32 kg/m².    Intake/Output Summary (Last 24 hours) at 12/03/17 1015  Last data filed at 12/03/17 0600   Gross per 24 hour   Intake              450 ml   Output                0 ml   Net              450 ml      Physical Exam   Constitutional: He appears well-developed and well-nourished. No distress. Nasal cannula in place.   Pulmonary/Chest: Effort normal. No respiratory distress. He has rhonchi.   Musculoskeletal: He exhibits no edema or tenderness.   Neurological: He is alert. He is disoriented.   Psychiatric: His affect is blunt. Cognition and memory are impaired.   Nursing note and vitals reviewed.      Significant Labs:   CBC:   No results for input(s): WBC, HGB, HCT, PLT in the last 48 hours.  CMP:   No results for input(s): NA, K, CL, CO2, GLU, BUN, CREATININE, CALCIUM, PROT, ALBUMIN, BILITOT, ALKPHOS, AST, ALT, ANIONGAP, EGFRNONAA in the last 48 hours.    Invalid input(s): ESTGFAFRICA  Magnesium:   No results for input(s): MG in the last 48 hours.    Significant Imaging: I have reviewed all pertinent imaging results/findings within the past 24 hours.

## 2017-12-03 NOTE — PT/OT/SLP PROGRESS
Physical Therapy      Sung Buckley  MRN: [id number]  [id number]  Patient not seen at this time -needs changing from being soiled. Will follow up    Zohreh Shine, PT   12/3/2017

## 2017-12-03 NOTE — PROGRESS NOTES
[facility] Hospital Medicine  Progress Note    Patient Name: Sung Buckley  MRN: [id number]  Patient Class: IP- Inpatient   Admission Date: 11/22/2017  Length of Stay: 11 days  Attending Physician: Jack White MD  Primary Care Provider: Maren Monterroso MD        Subjective:     Principal Problem:Closed subcapital fracture of right femur with routine healing    HPI:  Sung Buckley is a 72 y.o. white man with seizure disorder, hyperlipidemia, Alzheimer's and vascular dementia, dysphagia, neuropathy, and depression.  He lives in [place], [place].  He is .  His primary care physician is Dr. Maren Monterroso.  His neurologist is Dr. Bernardo Carrera.     He was taken to [facility] on 11/22/17 with right hip pain after a fall at home.  His wife reported that she was cooking breakfast when she heard him calling out for her and complaining of pain.  He sleeps in a hospital bed and the rails were up at the time.  She found him on the floor and he said that he was trying to get up to go to the bathroom.  She said that he normally calls for her assistance, but he did not this time.  He has a walker and wheelchair that he uses to get around the house with and the wheelchair was beside his bed, but he was not using it at this time.  He denied any loss of consciousness, light headedness, palpitations or chest pain prior to the fall.  He complained of pain in the right hip that is constant, sharp, and 8/10 in intensity.     Hospital Course:  He required supplemental oxygen on admission and had fever overnight the first night prior to going to the operating room.  He had right hemiarthroplasty by Dr. Abimael Hallman on 11/23/17 and was monitored in the ICU postoperatively.  He was started on piperacillin-tazobactam for possible aspiration pneumonia.  Respiratory status worsened and required comfort flow at 85% FiO2.  CTA chest was negative for pulmonary embolism, but did show  multilobar opacities predominantly in the lower lobes that likely represent pneumonia vs pneumonitis.  Vancomycin was added as he continued to have recurrent fever.  Modified barium swallow study was performed on 11/27/17 and dental soft diet with nectar thickened liquids was recommended.  His wife noticed him to be increasingly somnolent.  ABG on 11/29/17 showed hypoxia but no hypercapnia.  Follow-up chest x-ray showed no change.  His wife noticed oral thrush and observed him reaching for parts of his body as if he was in pain.  He was given ketorolac and rested afterward.  On the night of 11/30/17 his wife noted that he did not have restful sleep.  He had not had delirium precautions in place, either, which were started.  Neurology was consulted and his wife told them that he has episodes of staring at home.  They recommended increasing his levetiracetam dose.  EEG showed normal brain activity.  Quetiapine 12.5 mg was tried at night, but it caused agitation.  He participated with Physical/Occupational Therapy but wanted to get back into the bed.  His wife reported that ibuprofen works well for his pain at home, so this was given as needed.    Interval History: Day 9 on high flow nasal cannula.    Review of Systems   Unable to perform ROS: Dementia     Objective:     Vital Signs (Most Recent):  Temp: 96.9 °F (36.1 °C) (12/03/17 0804)  Pulse: 68 (12/03/17 0804)  Resp: 18 (12/03/17 0804)  BP: 131/63 (12/03/17 0804)  SpO2: (!) 94 % (12/03/17 0739) Vital Signs (24h Range):  Temp:  [96.9 °F (36.1 °C)-98.4 °F (36.9 °C)] 96.9 °F (36.1 °C)  Pulse:  [65-70] 68  Resp:  [18] 18  SpO2:  [93 %-96 %] 94 %  BP: (115-134)/(56-78) 131/63     Weight: 88.8 kg (195 lb 12.3 oz)  Body mass index is 27.32 kg/m².    Intake/Output Summary (Last 24 hours) at 12/03/17 1015  Last data filed at 12/03/17 0600   Gross per 24 hour   Intake              450 ml   Output                0 ml   Net              450 ml      Physical Exam    Constitutional: He appears well-developed and well-nourished. No distress. Nasal cannula in place.   Pulmonary/Chest: Effort normal. No respiratory distress. He has rhonchi.   Musculoskeletal: He exhibits no edema or tenderness.   Neurological: He is alert. He is disoriented.   Psychiatric: His affect is blunt. Cognition and memory are impaired.   Nursing note and vitals reviewed.      Significant Labs:   CBC:   No results for input(s): WBC, HGB, HCT, PLT in the last 48 hours.  CMP:   No results for input(s): NA, K, CL, CO2, GLU, BUN, CREATININE, CALCIUM, PROT, ALBUMIN, BILITOT, ALKPHOS, AST, ALT, ANIONGAP, EGFRNONAA in the last 48 hours.    Invalid input(s): ESTGFAFRICA  Magnesium:   No results for input(s): MG in the last 48 hours.    Significant Imaging: I have reviewed all pertinent imaging results/findings within the past 24 hours.       Assessment/Plan:      * Closed subcapital fracture of right femur with routine healing    S/p right hip hemiarthroplasty  Fall  Dr. Hallman performed hemiarthroplasty 11/23/17. Turn q2. Non-opiates for pain control. Docusate BID. Lovenox for DVT ppx. PT/OT. Will need SNF placement once respiratory status is improved.         Oral thrush    Started fluconazole on 11/30/17.          Aspiration pneumonia of both lungs    Acute respiratory failure with hypoxia  CTA with multilobar infiltrates.  Blood cultures had no growth.  Finished 8 days of piperacillin-tazobactam.  Finish 8 days of vancomycin today. Wean oxygen as able.  Echo with normal LVEF but with diastolic dysfunction.         Seizure disorder    Increase levetiracetam 250 mg TID to 750 mg BID per Neurology recommendations.  EEG on 12/1/17 was normal.        Iron deficiency anemia secondary to inadequate dietary iron intake    Ferritin was 16 in 2016. Continue iron supplement.           Oropharyngeal dysphagia    Appreciate SLP assistance.  Dental soft diet with nectar thickened liquids.         Polyneuropathy    Gait  disorder  Continue  folic acid and B12. PT/OT post-operatively. Hold gabapentin for now.         Depression with anxiety    Continue citalopram.           Mixed Alzheimer's and vascular dementia    Continue donepezil and memantine.  At high risk for delirium.  Start delirium precautions.          Mixed hyperlipidemia    Continue rosuvastatin and gemfibrozil.             VTE Risk Mitigation         Ordered     enoxaparin injection 40 mg  Daily     Route:  Subcutaneous        12/02/17 1706     Place sequential compression device  Until discontinued      11/23/17 1242     High Risk of VTE  Once      11/23/17 1242        Disposition: Has been on high flow nasal cannula for over a week.  Consult LTAC tomorrow to continue weaning if unable to switch to low flow nasal cannula.      Jack White MD  Department of Hospital Medicine   [facility]

## 2017-12-03 NOTE — PLAN OF CARE
Problem: Patient Care Overview  Goal: Plan of Care Review  Outcome: Ongoing (interventions implemented as appropriate)  Pt received on comfort flow nasal cannula at   15 lpm and 35%FiO2.  SPO2   %.  Pt in no apparent respiratory distress.  Will continue to monitor.

## 2017-12-03 NOTE — PLAN OF CARE
Problem: Patient Care Overview  Goal: Plan of Care Review  Outcome: Ongoing (interventions implemented as appropriate)  Pt is Pt is AAOx2, disoriented to time and situation. Pt complains of leg pain due to hx of restless leg syndrome per wife. Pt receiving IV antibiotics. Pt on 6L low flow O2. Pt on tele, NSR. Pts meds are crushed and given with pudding, liquids are nectar thick. Sitter at bedside.

## 2017-12-04 PROBLEM — J69.0 ASPIRATION PNEUMONIA OF BOTH LUNGS: Status: RESOLVED | Noted: 2017-11-23 | Resolved: 2017-12-04

## 2017-12-04 PROBLEM — R19.7 DIARRHEA: Status: ACTIVE | Noted: 2017-12-04

## 2017-12-04 LAB
ANION GAP SERPL CALC-SCNC: 9 MMOL/L
BUN SERPL-MCNC: 12 MG/DL
CALCIUM SERPL-MCNC: 8.8 MG/DL
CHLORIDE SERPL-SCNC: 108 MMOL/L
CO2 SERPL-SCNC: 21 MMOL/L
CREAT SERPL-MCNC: 0.9 MG/DL
ERYTHROCYTE [DISTWIDTH] IN BLOOD BY AUTOMATED COUNT: 14.9 %
EST. GFR  (AFRICAN AMERICAN): >60 ML/MIN/1.73 M^2
EST. GFR  (NON AFRICAN AMERICAN): >60 ML/MIN/1.73 M^2
GLUCOSE SERPL-MCNC: 117 MG/DL
HCT VFR BLD AUTO: 35.2 %
HGB BLD-MCNC: 11.5 G/DL
MCH RBC QN AUTO: 28.8 PG
MCHC RBC AUTO-ENTMCNC: 32.7 G/DL
MCV RBC AUTO: 88 FL
PLATELET # BLD AUTO: 627 K/UL
PMV BLD AUTO: 8.4 FL
POTASSIUM SERPL-SCNC: 4.3 MMOL/L
RBC # BLD AUTO: 4 M/UL
SODIUM SERPL-SCNC: 138 MMOL/L
TROPONIN I SERPL DL<=0.01 NG/ML-MCNC: <0.006 NG/ML
WBC # BLD AUTO: 8.48 K/UL

## 2017-12-04 PROCEDURE — 80048 BASIC METABOLIC PNL TOTAL CA: CPT

## 2017-12-04 PROCEDURE — 94761 N-INVAS EAR/PLS OXIMETRY MLT: CPT

## 2017-12-04 PROCEDURE — 97530 THERAPEUTIC ACTIVITIES: CPT

## 2017-12-04 PROCEDURE — 97110 THERAPEUTIC EXERCISES: CPT

## 2017-12-04 PROCEDURE — 63600175 PHARM REV CODE 636 W HCPCS: Performed by: HOSPITALIST

## 2017-12-04 PROCEDURE — 25000003 PHARM REV CODE 250: Performed by: HOSPITALIST

## 2017-12-04 PROCEDURE — 93005 ELECTROCARDIOGRAM TRACING: CPT

## 2017-12-04 PROCEDURE — 36415 COLL VENOUS BLD VENIPUNCTURE: CPT

## 2017-12-04 PROCEDURE — 27100171 HC OXYGEN HIGH FLOW UP TO 24 HOURS

## 2017-12-04 PROCEDURE — 85027 COMPLETE CBC AUTOMATED: CPT

## 2017-12-04 PROCEDURE — 94664 DEMO&/EVAL PT USE INHALER: CPT

## 2017-12-04 PROCEDURE — 87449 NOS EACH ORGANISM AG IA: CPT

## 2017-12-04 PROCEDURE — 27000173 HC ACAPELLA DEVICE DH OR DM

## 2017-12-04 PROCEDURE — 84484 ASSAY OF TROPONIN QUANT: CPT

## 2017-12-04 PROCEDURE — 11000001 HC ACUTE MED/SURG PRIVATE ROOM

## 2017-12-04 PROCEDURE — 97803 MED NUTRITION INDIV SUBSEQ: CPT

## 2017-12-04 RX ADMIN — POTASSIUM & SODIUM PHOSPHATES POWDER PACK 280-160-250 MG 2 PACKET: 280-160-250 PACK at 05:12

## 2017-12-04 RX ADMIN — POTASSIUM & SODIUM PHOSPHATES POWDER PACK 280-160-250 MG 2 PACKET: 280-160-250 PACK at 01:12

## 2017-12-04 RX ADMIN — ENOXAPARIN SODIUM 40 MG: 100 INJECTION SUBCUTANEOUS at 08:12

## 2017-12-04 RX ADMIN — IBUPROFEN 600 MG: 600 TABLET, FILM COATED ORAL at 08:12

## 2017-12-04 RX ADMIN — LEVETIRACETAM 750 MG: 750 TABLET ORAL at 08:12

## 2017-12-04 RX ADMIN — FOLIC ACID 1 MG: 1 TABLET ORAL at 10:12

## 2017-12-04 RX ADMIN — PANTOPRAZOLE SODIUM 40 MG: 40 TABLET, DELAYED RELEASE ORAL at 08:12

## 2017-12-04 RX ADMIN — GEMFIBROZIL 600 MG: 600 TABLET ORAL at 08:12

## 2017-12-04 RX ADMIN — LEVETIRACETAM 750 MG: 750 TABLET ORAL at 10:12

## 2017-12-04 RX ADMIN — FLUCONAZOLE 100 MG: 100 TABLET ORAL at 08:12

## 2017-12-04 RX ADMIN — FOLIC ACID 1 MG: 1 TABLET ORAL at 08:12

## 2017-12-04 RX ADMIN — POTASSIUM & SODIUM PHOSPHATES POWDER PACK 280-160-250 MG 2 PACKET: 280-160-250 PACK at 10:12

## 2017-12-04 RX ADMIN — CITALOPRAM HYDROBROMIDE 20 MG: 20 TABLET ORAL at 05:12

## 2017-12-04 RX ADMIN — LEVOTHYROXINE SODIUM 25 MCG: 25 TABLET ORAL at 07:12

## 2017-12-04 RX ADMIN — ROSUVASTATIN CALCIUM 10 MG: 10 TABLET, FILM COATED ORAL at 05:12

## 2017-12-04 RX ADMIN — POTASSIUM & SODIUM PHOSPHATES POWDER PACK 280-160-250 MG 2 PACKET: 280-160-250 PACK at 08:12

## 2017-12-04 RX ADMIN — MEMANTINE HYDROCHLORIDE 10 MG: 5 TABLET ORAL at 10:12

## 2017-12-04 RX ADMIN — DONEPEZIL HYDROCHLORIDE 10 MG: 5 TABLET, FILM COATED ORAL at 07:12

## 2017-12-04 NOTE — SUBJECTIVE & OBJECTIVE
Interval History: Day 10 on high flow nasal cannula.  Diarrhea yesterday, wife asks if he should be tested for C difficile due to recent antibiotics.  Also has lip and tongue ulcers.    Review of Systems   Unable to perform ROS: Dementia   Gastrointestinal: Positive for diarrhea.     Objective:     Vital Signs (Most Recent):  Temp: 97 °F (36.1 °C) (12/04/17 0802)  Pulse: 79 (12/04/17 0802)  Resp: 18 (12/04/17 0802)  BP: 138/65 (12/04/17 0802)  SpO2: 95 % (12/04/17 0753) Vital Signs (24h Range):  Temp:  [96 °F (35.6 °C)-97.9 °F (36.6 °C)] 97 °F (36.1 °C)  Pulse:  [62-79] 79  Resp:  [18-19] 18  SpO2:  [92 %-96 %] 95 %  BP: (129-144)/(62-67) 138/65     Weight: 88.8 kg (195 lb 12.3 oz)  Body mass index is 27.32 kg/m².    Intake/Output Summary (Last 24 hours) at 12/04/17 1017  Last data filed at 12/03/17 2356   Gross per 24 hour   Intake              500 ml   Output              129 ml   Net              371 ml      Physical Exam   Constitutional: He appears well-developed and well-nourished. He is cooperative. No distress. Nasal cannula in place.   HENT:   Oral ulcerations, no white plaques   Pulmonary/Chest: Effort normal. No respiratory distress. He has rhonchi.   Abdominal: Soft. He exhibits no distension. There is no tenderness. There is no guarding.   Musculoskeletal: He exhibits no edema or tenderness.   Neurological: He is alert. He is disoriented.   Psychiatric: His affect is blunt. Cognition and memory are impaired.   Nursing note and vitals reviewed.      Significant Labs:   CBC:     Recent Labs  Lab 12/04/17  0558   WBC 8.48   HGB 11.5*   HCT 35.2*   *     CMP:     Recent Labs  Lab 12/04/17  0558      K 4.3      CO2 21*   *   BUN 12   CREATININE 0.9   CALCIUM 8.8   ANIONGAP 9   EGFRNONAA >60     Significant Imaging: I have reviewed all pertinent imaging results/findings within the past 24 hours.

## 2017-12-04 NOTE — PLAN OF CARE
Problem: Patient Care Overview  Goal: Plan of Care Review  Outcome: Ongoing (interventions implemented as appropriate)  Pt resting in bed, wife at bedside, drowsy throughout day, confused.  Pain to right hip controlled with PRN medications.  R hip incision with dermabond, open to air, CDI. High flow O2 in place.  Medications administered as ordered.  Tele in place. Safety maintained.  Will continue to monitor.

## 2017-12-04 NOTE — PLAN OF CARE
Problem: Physical Therapy Goal  Goal: Physical Therapy Goal  Goals to be met by: 17     Patient will increase functional independence with mobility by performin. Supine <> sit with Maximum Assistance  2. Sit to stand transfer with Moderate Assistance MET 17  REVISED: CGA  3. Bed to chair transfer with Moderate Assistance using Rolling Walker  4. Gait  x 5 feet with Minimal Assistance using Rolling Walker.   5. Sitting at edge of bed x 15 minutes with Stand-by Assistance MET 17  REVISED: Supervision  6. Lower extremity exercise program x 10 reps per handout, with assistance as needed       Outcome: Ongoing (interventions implemented as appropriate)  Patient hacking up clear phlegm frequently throughout session; BLE exercise supine; required maxA to roll L with pillow placed between LEs, max/dep A supine to sit, sit to stand 3x with RW and Joe; unable to take steps today; will cont with POC.

## 2017-12-04 NOTE — PROGRESS NOTES
[facility] Hospital Medicine  Progress Note    Patient Name: Sung Buckley  MRN: [id number]  Patient Class: IP- Inpatient   Admission Date: 11/22/2017  Length of Stay: 12 days  Attending Physician: Jack White MD  Primary Care Provider: Maren Monterroso MD        Subjective:     Principal Problem:Closed subcapital fracture of right femur with routine healing    HPI:  Sung Buckley is a 72 y.o. white man with seizure disorder, hyperlipidemia, Alzheimer's and vascular dementia, dysphagia, neuropathy, and depression.  He lives in [place], [place].  He is .  His primary care physician is Dr. Maren Monterroso.  His neurologist is Dr. Bernardo Carrera.     He was taken to [facility] on 11/22/17 with right hip pain after a fall at home.  His wife reported that she was cooking breakfast when she heard him calling out for her and complaining of pain.  He sleeps in a hospital bed and the rails were up at the time.  She found him on the floor and he said that he was trying to get up to go to the bathroom.  She said that he normally calls for her assistance, but he did not this time.  He has a walker and wheelchair that he uses to get around the house with and the wheelchair was beside his bed, but he was not using it at this time.  He denied any loss of consciousness, light headedness, palpitations or chest pain prior to the fall.  He complained of pain in the right hip that is constant, sharp, and 8/10 in intensity.     Hospital Course:  He required supplemental oxygen on admission and had fever overnight the first night prior to going to the operating room.  He had right hemiarthroplasty by Dr. Abimael Hallman on 11/23/17 and was monitored in the ICU postoperatively.  He was started on piperacillin-tazobactam for possible aspiration pneumonia.  Respiratory status worsened and required comfort flow at 85% FiO2.  CTA chest was negative for pulmonary embolism, but did show  multilobar opacities predominantly in the lower lobes that likely represent pneumonia vs pneumonitis.  Vancomycin was added as he continued to have recurrent fever.  Modified barium swallow study was performed on 11/27/17 and dental soft diet with nectar thickened liquids was recommended.  His wife noticed him to be increasingly somnolent.  ABG on 11/29/17 showed hypoxia but no hypercapnia.  Follow-up chest x-ray showed no change.  His wife noticed oral thrush and observed him reaching for parts of his body as if he was in pain.  He was given ketorolac and rested afterward.  On the night of 11/30/17 his wife noted that he did not have restful sleep.  He had not had delirium precautions in place, either, which were started.  Neurology was consulted and his wife told them that he has episodes of staring at home.  They recommended increasing his levetiracetam dose.  EEG showed normal brain activity.  Quetiapine 12.5 mg was tried at night, but it caused agitation.  He participated with Physical/Occupational Therapy but wanted to get back into the bed.  His wife reported that ibuprofen works well for his pain at home, so this was given as needed.  He completed 8 days of both piperacillin-tazobactam and vancomycin.  He remained afebrile without leukocytosis, but on 12/3/17 had severe diarrhea.  He had no abdominal pain or tenderness.      Interval History: Day 10 on high flow nasal cannula.  Diarrhea yesterday, wife asks if he should be tested for C difficile due to recent antibiotics.  Also has lip and tongue ulcers.    Review of Systems   Unable to perform ROS: Dementia   Gastrointestinal: Positive for diarrhea.     Objective:     Vital Signs (Most Recent):  Temp: 97 °F (36.1 °C) (12/04/17 0802)  Pulse: 79 (12/04/17 0802)  Resp: 18 (12/04/17 0802)  BP: 138/65 (12/04/17 0802)  SpO2: 95 % (12/04/17 0753) Vital Signs (24h Range):  Temp:  [96 °F (35.6 °C)-97.9 °F (36.6 °C)] 97 °F (36.1 °C)  Pulse:  [62-79] 79  Resp:   [18-19] 18  SpO2:  [92 %-96 %] 95 %  BP: (129-144)/(62-67) 138/65     Weight: 88.8 kg (195 lb 12.3 oz)  Body mass index is 27.32 kg/m².    Intake/Output Summary (Last 24 hours) at 12/04/17 1017  Last data filed at 12/03/17 2356   Gross per 24 hour   Intake              500 ml   Output              129 ml   Net              371 ml      Physical Exam   Constitutional: He appears well-developed and well-nourished. He is cooperative. No distress. Nasal cannula in place.   HENT:   Oral ulcerations, no white plaques   Pulmonary/Chest: Effort normal. No respiratory distress. He has rhonchi.   Abdominal: Soft. He exhibits no distension. There is no tenderness. There is no guarding.   Musculoskeletal: He exhibits no edema or tenderness.   Neurological: He is alert. He is disoriented.   Psychiatric: His affect is blunt. Cognition and memory are impaired.   Nursing note and vitals reviewed.      Significant Labs:   CBC:     Recent Labs  Lab 12/04/17  0558   WBC 8.48   HGB 11.5*   HCT 35.2*   *     CMP:     Recent Labs  Lab 12/04/17  0558      K 4.3      CO2 21*   *   BUN 12   CREATININE 0.9   CALCIUM 8.8   ANIONGAP 9   EGFRNONAA >60     Significant Imaging: I have reviewed all pertinent imaging results/findings within the past 24 hours.       Assessment/Plan:      * Closed subcapital fracture of right femur with routine healing    S/p right hip hemiarthroplasty  Fall  Dr. Hallman performed hemiarthroplasty 11/23/17. Turn q2. Non-opiates for pain control. Docusate BID. Lovenox for DVT ppx. PT/OT. Will need SNF placement once respiratory status is improved.         Diarrhea    Check C difficile EIA.          Oral thrush    Started fluconazole on 11/30/17.          Acute respiratory failure with hypoxia    Finished antibiotics for aspiration pneumonia.  Continue incentive spirometry, chest physiotherapy, physical therapy to mobilize.  Still requiring high flow nasal cannula.  Repeat chest x-ray.           Seizure disorder    Increase levetiracetam 250 mg TID to 750 mg BID per Neurology recommendations.  EEG on 12/1/17 was normal.        Iron deficiency anemia secondary to inadequate dietary iron intake    Ferritin was 16 in 2016. Continue iron supplement.           Oropharyngeal dysphagia    Appreciate SLP assistance.  Dental soft diet with nectar thickened liquids.         Polyneuropathy    Gait disorder  Continue  folic acid and B12. PT/OT post-operatively. Hold gabapentin for now.         Depression with anxiety    Continue citalopram.           Mixed Alzheimer's and vascular dementia    Continue donepezil and memantine.  At high risk for delirium.  Start delirium precautions.          Mixed hyperlipidemia    Continue rosuvastatin and gemfibrozil.             VTE Risk Mitigation         Ordered     enoxaparin injection 40 mg  Daily     Route:  Subcutaneous        12/02/17 1706     Place sequential compression device  Until discontinued      11/23/17 1242     High Risk of VTE  Once      11/23/17 1242        Disposition: Consult LTAC to continue O2 weaning, PT, OT.       Jack White MD  Department of Hospital Medicine   [facility]

## 2017-12-04 NOTE — PLAN OF CARE
Problem: Patient Care Overview  Goal: Plan of Care Review  Pt is AAOx3 with complaints of pain in legs. Pt had episode of chest pain that resolved after a few minutes. Pt is a total feed and had been eating about 50% of meals. All meds crushed and mixed in pudding. Pt with multiple episodes of diarrhea. Pt placed on special contact isolation and stool sample sent down. R hip closed with dermabond. Pt remains on high flow nasal cannula.

## 2017-12-04 NOTE — ASSESSMENT & PLAN NOTE
Finished antibiotics for aspiration pneumonia.  Continue incentive spirometry, chest physiotherapy, physical therapy to mobilize.  Still requiring high flow nasal cannula.

## 2017-12-04 NOTE — PT/OT/SLP PROGRESS
Occupational Therapy   Treatment    Name: Sung Buckley  MRN: [id number]  Admitting Diagnosis:  Closed subcapital fracture of right femur with routine healing  11 Days Post-Op    Recommendations:     Discharge Recommendations: nursing facility, skilled  Discharge Equipment Recommendations:   (per SNF)  Barriers to discharge:  Decreased caregiver support    Subjective     Communicated with: nsg prior to session.  Pain/Comfort:  · Pain Rating 1:  (complains with movement to RLE)  · Location - Side 1: Right    Objective:     Patient found with:      Occupational Performance:    Bed Mobility:    · Patient completed Rolling/Turning to Left with  minimum assistance  · Patient completed Scooting/Bridging with maximal assistance  · Patient completed Supine to Sit with moderate assistance  · Patient completed Sit to Supine with maximal assistance and total assistance     Functional Mobility/Transfers:  · Patient completed Sit <> Stand Transfer with minimum assistance  with  rolling walker   · Patient completed Bed <> Chair Transfer using Stand Pivot technique with moderate assistance and maximal assistance with rolling walker    Activities of Daily Living:  · LB Dressing: total assistance césar brief and socks     Patient left supine with all lines intact, call button in reach, bed alarm on, nsg notified and wife present    [facility] 6 Click:  [facility] Total Score: 10    Treatment & Education:  Pt performing functional transitions from seated surface x 5 trials during session- EOB and chair; Pt initially Min A to stand from EOB with RW, however, Mod A after becoming fatigued; EOB<-->chair t/f with RW 1 trial with Max A- pt requiring assist with weight shifting to advance BLEs 2/2 pain and assist with RW management; increased time for performance for all axs; seated scooting EOB x 4 trials to HOB with Max A  Education:    Assessment:     Sung Buckley is a 72 y.o. male with a medical diagnosis of Closed subcapital fracture of right femur  with routine healing.Performance deficits affecting function are weakness, gait instability, decreased upper extremity function, decreased lower extremity function, impaired balance, impaired endurance, decreased ROM, impaired coordination, decreased safety awareness, impaired self care skills, impaired cognition, pain, impaired functional mobilty, edema, impaired skin.      Rehab Prognosis:  Good ; patient would benefit from acute skilled OT services to address these deficits and reach maximum level of function.       Plan:     Patient to be seen 5 x/week to address the above listed problems via self-care/home management, therapeutic activities, therapeutic exercises  · Plan of Care Expires: 12/24/17  · Plan of Care Reviewed with: patient, spouse    This Plan of care has been discussed with the patient who was involved in its development and understands and is in agreement with the identified goals and treatment plan    GOALS:    Occupational Therapy Goals        Problem: Occupational Therapy Goal    Goal Priority Disciplines Outcome Interventions   Occupational Therapy Goal     OT, PT/OT Ongoing (interventions implemented as appropriate)    Description:  Goals to be met by: 12/24     Patient will increase functional independence with ADLs by performing:    Feeding with Set-up Assistance. --MET 11/28  Grooming while seated with Minimal Assistance.  Sitting at edge of bed x10 minutes with Moderate Assistance. --MET 11/27  Rolling to Bilateral with Moderate Assistance.   Supine to sit with Maximum Assistance.  Toilet transfer to bedside commode with Maximum Assistance.                        Time Tracking:     OT Date of Treatment: 12/04/17  OT Start Time: 1200  OT Stop Time: 1234  OT Total Time (min): 34 min    Billable Minutes:Therapeutic Activity 34    Elina Baxter OT  12/4/2017

## 2017-12-04 NOTE — PROGRESS NOTES
Pt complaining of L sided stabbing chest pain 10 out of 10 that just started. Pt vitals WNL, 60s hr on tele. Dr. White notified and ordered ekg and troponins.

## 2017-12-04 NOTE — PLAN OF CARE
Problem: Occupational Therapy Goal  Goal: Occupational Therapy Goal  Goals to be met by: 12/24     Patient will increase functional independence with ADLs by performing:    Feeding with Set-up Assistance. --MET 11/28  Grooming while seated with Minimal Assistance.  Sitting at edge of bed x10 minutes with Moderate Assistance. --MET 11/27  Rolling to Bilateral with Moderate Assistance.   Supine to sit with Maximum Assistance.  Toilet transfer to bedside commode with Maximum Assistance.       Outcome: Ongoing (interventions implemented as appropriate)  Pt progressing towards goals. Tolerating increased standing trials this date as well as t/f OOB to seated surface. Cont OT POC

## 2017-12-04 NOTE — PROGRESS NOTES
Tele notified nurse of 7 beat run vtach. Pt sleeping at time, woke pt up and he stated he felt fine. Notified Dr. White.

## 2017-12-04 NOTE — ASSESSMENT & PLAN NOTE
Finished antibiotics for aspiration pneumonia.  Continue incentive spirometry, chest physiotherapy, physical therapy to mobilize.  Still requiring high flow nasal cannula, but will work to aggressively wean for sats >90%.  Repeat chest x-ray showed some improvement in the infiltrates. LTAC consulted yesterday for placement because of his continued need for the high levels of oxygen.

## 2017-12-04 NOTE — PLAN OF CARE
Problem: Patient Care Overview  Goal: Plan of Care Review  Outcome: Ongoing (interventions implemented as appropriate)  Pt is Oriented to self and place, pt complains of Rt hip pain with movement, no N/V. Pt receiving IV antibiotics. PT on 6 L, Nasal canula, High flow, pt has yanker/suction at bedside for sputum. Sitter at  Bedside.

## 2017-12-04 NOTE — PLAN OF CARE
Wife at bedside and TN discussed LTAC placement as pt remains on high flow oxygen. Mrs. Buckley stated [facility] would be her preference as it's closest to her home. TN sent referral to Earl[facility] at LT[facility] via [facility].   (pt had been accepted at [facility] prior to needing LTAC)       12/04/17 1207   Discharge Reassessment   Assessment Type Discharge Planning Reassessment   Provided patient/caregiver education on the expected discharge date and the discharge plan Yes   Do you have any problems affording any of your prescribed medications? No   Discharge Plan A Long-term acute care facility (LTAC)   Discharge Plan B Skilled Nursing Facility   Patient choice form signed by patient/caregiver N/A   Can the patient answer the patient profile reliably? No, cognitively impaired   How does the patient rate their overall health at the present time? Fair   Describe the patient's ability to walk at the present time. Major restrictions/daily assistance from another person   How often would a person be available to care for the patient? Whenever needed   Number of comorbid conditions (as recorded on the chart) Five or more   During the past month, has the patient often been bothered by feeling down, depressed or hopeless? No   During the past month, has the patient often been bothered by little interest or pleasure in doing things? No

## 2017-12-04 NOTE — PT/OT/SLP PROGRESS
Physical Therapy      Patient Name:  Sung Buckley   MRN:  [id number]    Patient not seen today secondary to  (increased diarrhea and possible c diff). Will follow-up heath.    Rickey Hernandez, PTA

## 2017-12-04 NOTE — PROGRESS NOTES
[facility]  Adult Nutrition  Progress Note    SUMMARY     Recommendations    Recommendation/Intervention:   Day 2 calorie count= 597 kcal & 24g protein. Day 2 calorie count= 1196 kcal & 39g protein. Pt estimated needs: 2068 kcal & 71-89g protein.     1. Continue to encourage po intake of meals & Boost.   2. If pt continues with inadequate po intake, may benefot from PEG tube.    Goals:   >85% of EEN, EPPN  Nutrition Goal Status: progressing towards goal  Communication of RD Recs: reviewed with RN    Continuum of Care Plan  Referral to Outpatient Services:  (pt to d/c on diet per ST recs)    Reason for Assessment  Reason for Assessment: RD follow-up  Diagnosis:  (Closed subcapital fracture of right femur with routine heali)  Relevent Medical History: GERD, depression, HLD, Alzheimers   Interdisciplinary Rounds: did not attend     General Information Comments: Day 2 calorie count results: 597 kcal & 24g protein. Day 3 calorie count= 1196 kcal & 39g protein. Pt drinkins some Boost. Wife reports poor intake at meals today.     Nutrition Discharge Planning: DC on Regular diet, texture per SLP.    Nutrition Prescription Ordered  Current Diet Order: Dental soft, nectar thick liquids  Oral Nutrition Supplement: Boost plus TID     Evaluation of Received Nutrients/Fluid Intake  Energy Calories Required: not meeting needs  Protein Required: not meeting needs  Fluid Required: not meeting needs  % Intake of Estimated Energy Needs: 25 - 50 %  % Meal Intake: 25%     Nutrition Risk Screen   Nutrition Risk Screen: no indicators present    Nutrition/Diet History  Food Preferences: no Taoist or cultural food prefs identified  Factors Affecting Nutritional Intake: decreased appetite    Labs/Tests/Procedures/Meds  Pertinent Labs Reviewed: reviewed  Pertinent Labs Comments: Glu 171H, Ca 8.6L  Pertinent Medications Reviewed: reviewed  Pertinent Medications Comments: folic acid, ferrous sulfate    Physical  "Findings  Overall Physical Appearance: weak  Tubes:  (none)  Skin:  (Jhonny 10- R hip incision)    Anthropometrics  Temp: 97 °F (36.1 °C)  Height: 5' 10.98" (180.3 cm)  Weight Method: Bed Scale  Weight: 88.8 kg (195 lb 12.3 oz)  Ideal Body Weight (IBW), Male: 171.88 lb  % Ideal Body Weight, Male (lb): 115.31 lb  BMI (Calculated): 27.7  BMI Grade: 25 - 29.9 - overweight    Estimated/Assessed Needs  Weight Used For Calorie Calculations: 88.8 kg (195 lb 12.3 oz)   Height (cm): 180.3 cm  Energy Calorie Requirements (kcal): 2068  Energy Need Method: Crittenden-St Jeor (x 1.25(PAL))  RMR (Crittenden-St. Jeor Equation): 1654.87  Weight Used For Protein Calculations: 88.8 kg (195 lb 12.3 oz)  Protein Requirements: 71-89g (0.8-1.0g/kg)  Fluid Need Method: RDA Method (or per MD. )  RDA Method (mL): 2068     Assessment and Plan    Oropharyngeal dysphagia    Related to (etiology):   hx    Signs and Symptoms (as evidenced by):   Restricted diet per ST    Interventions/Recommendations (treatment strategy):  See recs    Nutrition Diagnosis Status:   New            Monitor and Evaluation  Food and Nutrient Intake: food and beverage intake  Physical Activity and Function: nutrition-related ADLs and IADLs  Anthropometric Measurements: weight, weight change  Biochemical Data, Medical Tests and Procedures: electrolyte and renal panel, gastrointestinal profile, glucose/endocrine profile, inflammatory profile, lipid profile  Nutrition-Focused Physical Findings: overall appearance    Nutrition Risk  Level of Risk:  (2xweekly)    Nutrition Follow-Up  RD Follow-up?: Yes  "

## 2017-12-04 NOTE — PT/OT/SLP PROGRESS
Physical Therapy  Treatment    Sung Buckley   MRN: [id number]   Admitting Diagnosis: Closed subcapital fracture of right femur with routine healing    PT Received On: 12/03/17  PT Start Time: 1556     PT Stop Time: 1647    PT Total Time (min): 51 min       Billable Minutes:  Therapeutic Activity 30 minutes and Therapeutic Exercise 21 minutes    Treatment Type: Treatment  PT/PTA: PT     PTA Visit Number: 0       General Precautions: Standard, fall  Orthopedic Precautions: RLE weight bearing as tolerated, RLE posterior precautions   Braces:  (ABD pillow)    Do you have any cultural, spiritual, Amish conflicts, given your current situation?: none reported to PT    Subjective:  Communicated with nurse prior to session.  C/o fatigue following session    Pain/Comfort  Pain Rating 1:  (only c/o pain with rolling; pt did not rate)  Location - Side 1: Right  Location 1: hip  Pain Addressed 1: Distraction  Pain Rating Post-Intervention 1:  (no s/s of pain)    Objective:   Patient found with: oxygen, telemetry, SCD, peripheral IV    Functional Mobility:  Bed Mobility:   Rolling/Turning Right: Maximum assistance (with pillow between LEs)  Scooting/Bridging: Moderate Assistance (seated scooting toward HOB x 4 trials)  Supine to Sit: Maximum Assistance, Dependent, With side rail (also used function on bed to raise HOB after pt had rolled L and LEs extended over EOB)  Sit to Supine: Maximum Assistance, With assist of  2    Transfers:  Sit <> Stand Assistance: Minimum Assistance (x 3 trials)  Sit <> Stand Assistive Device: Rolling Walker    Gait:   Gait Distance: Pt unable to take steps today    Balance:   Static Sit: FAIR+: Able to take MINIMAL challenges from all directions  Dynamic Sit: FAIR+: Maintains balance through MINIMAL excursions of active trunk motion  Static Stand: FAIR: Maintains without assist but unable to take challenges  Dynamic stand: 0: N/A     Therapeutic Activities and Exercises:  Patient received JAYESH RHODES ex  2 x 10 reps APs, ankle circles, hip abd/neutral, heel slides, TKEs; required maxA to roll L with pillow placed between LEs, max/dep A supine to sit as described above, sit to stand 3x with RW, Joe and slow ascent/descent; scooted toward HOB with modA x 4 trials; pt sat at EOB x 20 minutes with SBA; all movements slow    AM-PAC 6 CLICK MOBILITY  How much help from another person does this patient currently need?   1 = Unable, Total/Dependent Assistance  2 = A lot, Maximum/Moderate Assistance  3 = A little, Minimum/Contact Guard/Supervision  4 = None, Modified [place]/Independent    Turning over in bed (including adjusting bedclothes, sheets and blankets)?: 2  Sitting down on and standing up from a chair with arms (e.g., wheelchair, bedside commode, etc.): 3  Moving from lying on back to sitting on the side of the bed?: 2  Moving to and from a bed to a chair (including a wheelchair)?: 2  Need to walk in hospital room?: 1  Climbing 3-5 steps with a railing?: 1  Total Score: 11    AM-PAC Raw Score CMS G-Code Modifier Level of Impairment Assistance   6 % Total / Unable   7 - 9 CM 80 - 100% Maximal Assist   10 - 14 CL 60 - 80% Moderate Assist   15 - 19 CK 40 - 60% Moderate Assist   20 - 22 CJ 20 - 40% Minimal Assist   23 CI 1-20% SBA / CGA   24 CH 0% Independent/ Mod I     Patient left HOB elevated with all lines intact, call button in reach, bed alarm on, nurse notified and wife present.    Assessment:  Sung Buckley is a 72 y.o. male with a medical diagnosis of Closed subcapital fracture of right femur with routine healing; fatigue following session; increased time for all tasks; slow movements; requiring assist as outlined above  Rehab identified problem list/impairments: Rehab identified problem list/impairments: weakness, impaired endurance, gait instability, impaired functional mobilty, impaired self care skills, impaired balance, impaired cognition, decreased coordination, decreased upper extremity  function, decreased lower extremity function, decreased safety awareness, decreased ROM, pain, edema    Rehab potential is fair.    Activity tolerance: Fair    Discharge recommendations: Discharge Facility/Level Of Care Needs: nursing facility, skilled     Barriers to discharge: Barriers to Discharge: Decreased caregiver support    Equipment recommendations: Equipment Needed After Discharge:  (per SNF)     GOALS:    Physical Therapy Goals        Problem: Physical Therapy Goal    Goal Priority Disciplines Outcome Goal Variances Interventions   Physical Therapy Goal     PT/OT, PT Ongoing (interventions implemented as appropriate)     Description:  Goals to be met by: 17     Patient will increase functional independence with mobility by performin. Supine <> sit with Maximum Assistance  2. Sit to stand transfer with Moderate Assistance MET 17  REVISED: CGA  3. Bed to chair transfer with Moderate Assistance using Rolling Walker  4. Gait  x 5 feet with Minimal Assistance using Rolling Walker.   5. Sitting at edge of bed x 15 minutes with Stand-by Assistance MET 17  REVISED: Supervision  6. Lower extremity exercise program x 10 reps per handout, with assistance as needed               Problem: Physical Therapy Goal    Goal Priority Disciplines Outcome Goal Variances Interventions   Physical Therapy Goal     PT/OT, PT Ongoing (interventions implemented as appropriate)                     PLAN:    Patient to be seen 6 x/week (BID M-F if tolerated; daily wknds)  to address the above listed problems via gait training, therapeutic activities, therapeutic exercises  Plan of Care expires: 17  Plan of Care reviewed with: patient         Zohreh Shine, PT  2017

## 2017-12-05 LAB
C DIFF GDH STL QL: NEGATIVE
C DIFF TOX A+B STL QL IA: NEGATIVE

## 2017-12-05 PROCEDURE — 97110 THERAPEUTIC EXERCISES: CPT

## 2017-12-05 PROCEDURE — 25000003 PHARM REV CODE 250: Performed by: HOSPITALIST

## 2017-12-05 PROCEDURE — 27000173 HC ACAPELLA DEVICE DH OR DM

## 2017-12-05 PROCEDURE — 94664 DEMO&/EVAL PT USE INHALER: CPT

## 2017-12-05 PROCEDURE — 63600175 PHARM REV CODE 636 W HCPCS: Performed by: HOSPITALIST

## 2017-12-05 PROCEDURE — 11000001 HC ACUTE MED/SURG PRIVATE ROOM

## 2017-12-05 PROCEDURE — 97530 THERAPEUTIC ACTIVITIES: CPT

## 2017-12-05 PROCEDURE — 27100171 HC OXYGEN HIGH FLOW UP TO 24 HOURS

## 2017-12-05 RX ADMIN — LEVOTHYROXINE SODIUM 25 MCG: 25 TABLET ORAL at 06:12

## 2017-12-05 RX ADMIN — FOLIC ACID 1 MG: 1 TABLET ORAL at 09:12

## 2017-12-05 RX ADMIN — ENOXAPARIN SODIUM 40 MG: 100 INJECTION SUBCUTANEOUS at 09:12

## 2017-12-05 RX ADMIN — LEVETIRACETAM 750 MG: 750 TABLET ORAL at 09:12

## 2017-12-05 RX ADMIN — POTASSIUM & SODIUM PHOSPHATES POWDER PACK 280-160-250 MG 2 PACKET: 280-160-250 PACK at 09:12

## 2017-12-05 RX ADMIN — GEMFIBROZIL 600 MG: 600 TABLET ORAL at 09:12

## 2017-12-05 RX ADMIN — FLUCONAZOLE 100 MG: 100 TABLET ORAL at 09:12

## 2017-12-05 RX ADMIN — PANTOPRAZOLE SODIUM 40 MG: 40 TABLET, DELAYED RELEASE ORAL at 09:12

## 2017-12-05 RX ADMIN — POTASSIUM & SODIUM PHOSPHATES POWDER PACK 280-160-250 MG 2 PACKET: 280-160-250 PACK at 11:12

## 2017-12-05 RX ADMIN — MEMANTINE HYDROCHLORIDE 10 MG: 5 TABLET ORAL at 09:12

## 2017-12-05 RX ADMIN — IBUPROFEN 600 MG: 600 TABLET, FILM COATED ORAL at 11:12

## 2017-12-05 RX ADMIN — DONEPEZIL HYDROCHLORIDE 10 MG: 5 TABLET, FILM COATED ORAL at 06:12

## 2017-12-05 RX ADMIN — POTASSIUM & SODIUM PHOSPHATES POWDER PACK 280-160-250 MG 2 PACKET: 280-160-250 PACK at 05:12

## 2017-12-05 NOTE — PROGRESS NOTES
[facility] Hospital Medicine  Progress Note    Patient Name: Sung Buckley  MRN: [id number]  Patient Class: IP- Inpatient   Admission Date: 11/22/2017  Length of Stay: 13 days  Attending Physician: Diimtrios Duke MD  Primary Care Provider: Maren Monterroso MD        Subjective:     Principal Problem:Closed subcapital fracture of right femur with routine healing    HPI:  Sung Buckley is a 72 y.o. white man with seizure disorder, hyperlipidemia, Alzheimer's and vascular dementia, dysphagia, neuropathy, and depression.  He lives in [place], [place].  He is .  His primary care physician is Dr. Maren Monterroso.  His neurologist is Dr. Bernardo Carrera.     He was taken to [facility] on 11/22/17 with right hip pain after a fall at home.  His wife reported that she was cooking breakfast when she heard him calling out for her and complaining of pain.  He sleeps in a hospital bed and the rails were up at the time.  She found him on the floor and he said that he was trying to get up to go to the bathroom.  She said that he normally calls for her assistance, but he did not this time.  He has a walker and wheelchair that he uses to get around the house with and the wheelchair was beside his bed, but he was not using it at this time.  He denied any loss of consciousness, light headedness, palpitations or chest pain prior to the fall.  He complained of pain in the right hip that is constant, sharp, and 8/10 in intensity.     Hospital Course:  He required supplemental oxygen on admission and had fever overnight the first night prior to going to the operating room.  He had right hemiarthroplasty by Dr. Abimael Hallman on 11/23/17 and was monitored in the ICU postoperatively.  He was started on piperacillin-tazobactam for possible aspiration pneumonia.  Respiratory status worsened and required comfort flow at 85% FiO2.  CTA chest was negative for pulmonary embolism, but did show  multilobar opacities predominantly in the lower lobes that likely represent pneumonia vs pneumonitis.  Vancomycin was added as he continued to have recurrent fever.  Modified barium swallow study was performed on 11/27/17 and dental soft diet with nectar thickened liquids was recommended.  His wife noticed him to be increasingly somnolent.  ABG on 11/29/17 showed hypoxia but no hypercapnia.  Follow-up chest x-ray showed no change.  His wife noticed oral thrush and observed him reaching for parts of his body as if he was in pain.  He was given ketorolac and rested afterward.  On the night of 11/30/17 his wife noted that he did not have restful sleep.  He had not had delirium precautions in place, either, which were started.  Neurology was consulted and his wife told them that he has episodes of staring at home.  They recommended increasing his levetiracetam dose.  EEG showed normal brain activity.  Quetiapine 12.5 mg was tried at night, but it caused agitation.  He participated with Physical/Occupational Therapy but wanted to get back into the bed.  His wife reported that ibuprofen works well for his pain at home, so this was given as needed.  He completed 8 days of both piperacillin-tazobactam and vancomycin.  He remained afebrile without leukocytosis, but on 12/3/17 had severe diarrhea.  He had no abdominal pain or tenderness.  C.diff was negative. Diarrhea improved.     Interval History: Still on high flow NC this AM at 11 LPM. Sats have been >97% recently. Says that he is feeling okay. Wife says that he is more alert today. Ate most of his grits this AM. No diarrhea today.     Review of Systems   Unable to perform ROS: Dementia     Objective:     Vital Signs (Most Recent):  Temp: 97.2 °F (36.2 °C) (12/05/17 1206)  Pulse: 66 (12/05/17 1548)  Resp: 18 (12/05/17 1548)  BP: (!) 107/55 (12/05/17 1206)  SpO2: (!) 93 % (12/05/17 1548) Vital Signs (24h Range):  Temp:  [96.3 °F (35.7 °C)-98.2 °F (36.8 °C)] 97.2 °F (36.2  °C)  Pulse:  [55-69] 66  Resp:  [18] 18  SpO2:  [93 %-100 %] 93 %  BP: (107-123)/(55-61) 107/55     Weight: 88.8 kg (195 lb 12.3 oz)  Body mass index is 27.32 kg/m².  No intake or output data in the 24 hours ending 12/05/17 1551   Physical Exam   Constitutional: He appears well-developed and well-nourished. No distress. Nasal cannula in place.   Cardiovascular: Normal rate and regular rhythm.    No murmur heard.  Pulmonary/Chest: Effort normal. No respiratory distress. He has no wheezes. He has rhonchi.   Abdominal: Soft. Bowel sounds are normal. He exhibits no distension. There is no tenderness.   Musculoskeletal: He exhibits no edema or tenderness.   Neurological: He is alert. He is disoriented.   Skin: Skin is warm and dry.   Nursing note and vitals reviewed.      Significant Labs:   CBC:   Recent Labs  Lab 12/04/17  0558   WBC 8.48   HGB 11.5*   HCT 35.2*   *     CMP:   Recent Labs  Lab 12/04/17  0558      K 4.3      CO2 21*   *   BUN 12   CREATININE 0.9   CALCIUM 8.8   ANIONGAP 9   EGFRNONAA >60       Significant Imaging: CXR: I have reviewed all pertinent results/findings within the past 24 hours and my personal findings are:  Improved bilateral infiltrates    Assessment/Plan:      * Closed subcapital fracture of right femur with routine healing    S/p right hip hemiarthroplasty  Fall  Dr. Hallman performed hemiarthroplasty 11/23/17. Turn q2. Acetaminophen and ibuprofen prn for pain control. Docusate BID. Lovenox for DVT ppx. PT/OT. Will need SNF placement once respiratory status is improved.         Diarrhea    Check C difficile EIA.          Oral thrush    Started fluconazole on 11/30/17. Plan to treat for 10 days (end date 12/9/17).          Acute respiratory failure with hypoxia    Finished antibiotics for aspiration pneumonia.  Continue incentive spirometry, chest physiotherapy, physical therapy to mobilize.  Still requiring high flow nasal cannula, but will work to aggressively  wean for sats >90%.  Repeat chest x-ray showed some improvement in the infiltrates. LTAC consulted yesterday for placement because of his continued need for the high levels of oxygen.           Seizure disorder    Increased levetiracetam 250 mg TID to 750 mg BID per Neurology recommendations.  EEG on 12/1/17 was normal.        Iron deficiency anemia secondary to inadequate dietary iron intake    Ferritin was 16 in 2016. Continue iron supplement.           Oropharyngeal dysphagia    Appreciate SLP assistance.  Dental soft diet with nectar thickened liquids.         Polyneuropathy    Gait disorder  Continue  folic acid and B12. PT/OT post-operatively. Hold gabapentin for now due to lethargy.         Depression with anxiety    Continue citalopram.           Mixed Alzheimer's and vascular dementia    Continue donepezil and memantine.  At high risk for delirium.  Delirium precautions.          Mixed hyperlipidemia    Continue rosuvastatin and gemfibrozil.             VTE Risk Mitigation         Ordered     enoxaparin injection 40 mg  Daily     Route:  Subcutaneous        12/02/17 1706     Place sequential compression device  Until discontinued      11/23/17 1242     High Risk of VTE  Once      11/23/17 1242              Dimitrios Duke MD  Department of Hospital Medicine   [facility]

## 2017-12-05 NOTE — PT/OT/SLP PROGRESS
Physical Therapy Treatment    Patient Name:  Sung Buckley   MRN:  [id number]    Recommendations:     Discharge Recommendations:  nursing facility, skilled   Discharge Equipment Recommendations:  (per SNF)   Barriers to discharge: Decreased caregiver support    Assessment:     Sung Buckley is a 72 y.o. male admitted with a medical diagnosis of Closed subcapital fracture of right femur with routine healing.  He presents with the following impairments/functional limitations:  weakness, impaired self care skills, impaired balance, decreased coordination, decreased safety awareness, decreased ROM, impaired skin, impaired endurance, impaired functional mobilty, decreased upper extremity function, pain, edema, gait instability, impaired cognition, decreased lower extremity function, orthopedic precautions Pt progressing toward goals with sit to stand trials and steptaking with RW.    Rehab Prognosis:  good; patient would benefit from acute skilled PT services to address these deficits and reach maximum level of function.      Recent Surgery: Procedure(s) (LRB):  ARTHROPLASTY-HIP-BURT (Right) 11 Days Post-Op    Plan:     During this hospitalization, patient to be seen BID (M-F if tolerated; daily wknds) to address the above listed problems via gait training, therapeutic activities, therapeutic exercises  · Plan of Care Expires:  12/24/17   Plan of Care Reviewed with: patient    Subjective     Communicated with nurse prior to session.  Patient found supine upon PT entry to room, agreeable to treatment.      Pain/Comfort:  · Pain Rating 1:  (complains with movement of RLE)  · Location - Side 1: Right  · Location - Orientation 1: lateral  · Location 1: hip  · Pain Addressed 1: Distraction    Patients cultural, spiritual, Episcopal conflicts given the current situation: none    Objective:     Patient found with: oxygen, telemetry, SCD, peripheral IV     General Precautions: Standard, fall   Orthopedic Precautions:RLE weight  bearing as tolerated, RLE posterior precautions   Braces:  (ABD pillow)     Functional Mobility:  · Bed Mobility:     · Rolling Left:  minimum assistance  · Scooting: maximal assistance  · Supine to Sit: moderate assistance  · Sit to Supine: maximal assistance and total assistance  · Transfers:     · Sit to Stand:  minimum assistance with rolling walker x 5 trials (modA after becoming fatigued  · Bed <> Chair: moderate assistance and maximal assistance with  rolling walker  using  Stand Pivot  · Gait: Pt took 4 steps forward with modA and 4 steps backward with maxA using RW, max VCs for technique, short step/stride length, foot flat, decreased toe clearance      AM-PAC 6 CLICK MOBILITY  Turning over in bed (including adjusting bedclothes, sheets and blankets)?: 2  Sitting down on and standing up from a chair with arms (e.g., wheelchair, bedside commode, etc.): 3  Moving from lying on back to sitting on the side of the bed?: 2  Moving to and from a bed to a chair (including a wheelchair)?: 2  Need to walk in hospital room?: 1  Climbing 3-5 steps with a railing?: 1  Total Score: 11       Therapeutic Activities and Exercises:   Pt performed BLE AAROM ex 2 x 10 reps: APs, ankle circles, TKEs, hip abd/add, heel slides; functional mob as described above    Patient left supine with all lines intact, call button in reach, bed alarm on, nurse notified and wife present..    GOALS:    Physical Therapy Goals        Problem: Physical Therapy Goal    Goal Priority Disciplines Outcome Goal Variances Interventions   Physical Therapy Goal     PT/OT, PT Ongoing (interventions implemented as appropriate)     Description:  Goals to be met by: 17     Patient will increase functional independence with mobility by performin. Supine <> sit with Maximum Assistance  2. Sit to stand transfer with Moderate Assistance MET 17  REVISED: CGA  3. Bed to chair transfer with Moderate Assistance using Rolling Walker  4. Gait  x 5  feet with Minimal Assistance using Rolling Walker.   5. Sitting at edge of bed x 15 minutes with Stand-by Assistance MET 11/30/17  REVISED: Supervision  6. Lower extremity exercise program x 10 reps per handout, with assistance as needed                Problem: Physical Therapy Goal    Goal Priority Disciplines Outcome Goal Variances Interventions   Physical Therapy Goal     PT/OT, PT Ongoing (interventions implemented as appropriate)                     Time Tracking:     PT Received On: 12/04/17  PT Start Time: 1147     PT Stop Time: 1234  PT Total Time (min): 47 min with OT    Billable Minutes: Therapeutic Exercise 13 minutes    Treatment Type: Treatment  PT/PTA: PT     PTA Visit Number: 0     Zohreh Shine, PT  12/04/2017

## 2017-12-05 NOTE — ASSESSMENT & PLAN NOTE
Increased levetiracetam 250 mg TID to 750 mg BID per Neurology recommendations.  EEG on 12/1/17 was normal.

## 2017-12-05 NOTE — PT/OT/SLP PROGRESS
Occupational Therapy   Treatment    Name: Sung Buckley  MRN: [id number]  Admitting Diagnosis:  Closed subcapital fracture of right femur with routine healing  12 Days Post-Op    Recommendations:     Discharge Recommendations: nursing facility, skilled  Discharge Equipment Recommendations:   (TBD by SNF)  Barriers to discharge:  Decreased caregiver support    Subjective     Communicated with: RN prior to session.  Pain/Comfort:  · Pain Rating 1: 10/10  · Location - Side 1: Right  · Location - Orientation 1: generalized  · Location 1: hip  · Pain Addressed 1: Reposition, Distraction, Nurse notified  · Pain Rating Post-Intervention 1: 10/10    Objective:     Patient found with: telemetry, oxygen, peripheral IV    General Precautions: Standard, fall   Orthopedic Precautions:RLE posterior precautions, RLE weight bearing as tolerated   Braces:  (abd pillow)     Occupational Performance:    Bed Mobility:    · Patient completed Rolling/Turning to Left with  maximal assistance  · Patient completed Scooting/Bridging with maximal assistance  · Patient completed Supine to Sit with maximal assistance, vc's for effective technique maintaining posterior hip precautions.  · Patient completed Sit to Supine with maximal assistance, same as above.    Functional Mobility/Transfers:  · Patient completed Sit <> Stand Transfer with minimum assistance and moderate assistance  with  rolling walker and vc's for effective technique, maintaining posterior hip precautions.     Activities of Daily Living:  · Grooming: minimum assistance sitting at EOB    Patient left HOB elevated with all lines intact, call button in reach, bed alarm on and spouse present    AMPA 6 Click:  AMPA Total Score: 10    Treatment & Education:  Pt sat EOB x 15 min with CGA-SBA.  Sit<>stand with Min-Mod A using RW x 3 trials.  Pt required vc/tc's for upright posture, effective technique, and maintaining posterior hip precautions.  He tolerated ~30 stand each attempt.     Education:    Assessment:     Sung Buckley is a 72 y.o. male with a medical diagnosis of Closed subcapital fracture of right femur with routine healing.  He presents with posterior hip precautions, WB precautions, pain, decreased cognition and decreased overall strength, endurance, balance and [place] and safety with ADL's and fx mobility. Pt required moderate encouragement to participate in therapy.  He was unable to take steps secondary to pain and wanting to sit down. Continue OT services to address functional goals, progressing as able.      Rehab Prognosis:  good; patient would benefit from acute skilled OT services to address these deficits and reach maximum level of function.       Plan:     Patient to be seen 5 x/week to address the above listed problems via self-care/home management, therapeutic activities, therapeutic exercises  · Plan of Care Expires: 12/24/17  · Plan of Care Reviewed with: patient    This Plan of care has been discussed with the patient who was involved in its development and understands and is in agreement with the identified goals and treatment plan    GOALS:    Occupational Therapy Goals        Problem: Occupational Therapy Goal    Goal Priority Disciplines Outcome Interventions   Occupational Therapy Goal     OT, PT/OT Ongoing (interventions implemented as appropriate)    Description:  Goals to be met by: 12/24     Patient will increase functional independence with ADLs by performing:    Feeding with Set-up Assistance. --MET 11/28  Grooming while seated with Minimal Assistance.  Sitting at edge of bed x10 minutes with Moderate Assistance. --MET 11/27  Rolling to Bilateral with Moderate Assistance.   Supine to sit with Maximum Assistance.  Toilet transfer to bedside commode with Maximum Assistance.                        Time Tracking:     OT Date of Treatment: 12/05/17  OT Start Time: 1107  OT Stop Time: 1131  OT Total Time (min): 24 min    Billable Minutes:Therapeutic Activity  24    ARNOLD Navarro/CLINTON  12/5/2017

## 2017-12-05 NOTE — SUBJECTIVE & OBJECTIVE
Interval History: Still on high flow NC this AM at 11 LPM. Sats have been >97% recently. Says that he is feeling okay. Wife says that he is more alert today. Ate most of his grits this AM. No diarrhea today.     Review of Systems   Unable to perform ROS: Dementia     Objective:     Vital Signs (Most Recent):  Temp: 97.2 °F (36.2 °C) (12/05/17 1206)  Pulse: 66 (12/05/17 1548)  Resp: 18 (12/05/17 1548)  BP: (!) 107/55 (12/05/17 1206)  SpO2: (!) 93 % (12/05/17 1548) Vital Signs (24h Range):  Temp:  [96.3 °F (35.7 °C)-98.2 °F (36.8 °C)] 97.2 °F (36.2 °C)  Pulse:  [55-69] 66  Resp:  [18] 18  SpO2:  [93 %-100 %] 93 %  BP: (107-123)/(55-61) 107/55     Weight: 88.8 kg (195 lb 12.3 oz)  Body mass index is 27.32 kg/m².  No intake or output data in the 24 hours ending 12/05/17 1551   Physical Exam   Constitutional: He appears well-developed and well-nourished. No distress. Nasal cannula in place.   Cardiovascular: Normal rate and regular rhythm.    No murmur heard.  Pulmonary/Chest: Effort normal. No respiratory distress. He has no wheezes. He has rhonchi.   Abdominal: Soft. Bowel sounds are normal. He exhibits no distension. There is no tenderness.   Musculoskeletal: He exhibits no edema or tenderness.   Neurological: He is alert. He is disoriented.   Skin: Skin is warm and dry.   Nursing note and vitals reviewed.      Significant Labs:   CBC:   Recent Labs  Lab 12/04/17  0558   WBC 8.48   HGB 11.5*   HCT 35.2*   *     CMP:   Recent Labs  Lab 12/04/17  0558      K 4.3      CO2 21*   *   BUN 12   CREATININE 0.9   CALCIUM 8.8   ANIONGAP 9   EGFRNONAA >60       Significant Imaging: CXR: I have reviewed all pertinent results/findings within the past 24 hours and my personal findings are:  Improved bilateral infiltrates

## 2017-12-05 NOTE — ASSESSMENT & PLAN NOTE
S/p right hip hemiarthroplasty  Fall  Dr. Hallman performed hemiarthroplasty 11/23/17. Turn q2. Acetaminophen and ibuprofen prn for pain control. Docusate BID. Lovenox for DVT ppx. PT/OT. Will need SNF placement once respiratory status is improved.

## 2017-12-05 NOTE — ASSESSMENT & PLAN NOTE
Gait disorder  Continue  folic acid and B12. PT/OT post-operatively. Hold gabapentin for now due to lethargy.

## 2017-12-05 NOTE — PLAN OF CARE
Problem: Patient Care Overview  Goal: Plan of Care Review  Outcome: Ongoing (interventions implemented as appropriate)  Pt on 12 lpm High Flow Nasal Cannula. SpO2 99%. Does acapella well. Will continue to monitor.

## 2017-12-05 NOTE — PLAN OF CARE
Problem: Occupational Therapy Goal  Goal: Occupational Therapy Goal  Goals to be met by: 12/24     Patient will increase functional independence with ADLs by performing:    Feeding with Set-up Assistance. --MET 11/28  Grooming while seated with Minimal Assistance.  Sitting at edge of bed x10 minutes with Moderate Assistance. --MET 11/27  Rolling to Bilateral with Moderate Assistance.   Supine to sit with Maximum Assistance.  Toilet transfer to bedside commode with Maximum Assistance.       Outcome: Ongoing (interventions implemented as appropriate)  Sugn Buckley is a 72 y.o. male with a medical diagnosis of Closed subcapital fracture of right femur with routine healing.  He presents with posterior hip precautions, WB precautions, pain, decreased cognition and decreased overall strength, endurance, balance and [place] and safety with ADL's and fx mobility. Pt required moderate encouragement to participate in therapy.  He was unable to take steps secondary to pain and wanting to sit down. Continue OT services to address functional goals, progressing as able.    ARNOLD Navarro/L

## 2017-12-05 NOTE — PLAN OF CARE
Problem: Patient Care Overview  Goal: Plan of Care Review  Outcome: Ongoing (interventions implemented as appropriate)  Pt is AAOx3 with complaints of pain in legs. Pt is a total feed and has been eating about 50% of meals. All meds crushed and mixed in pudding. Pt with multiple episodes of diarrhea. R hip closed with dermabond. Pt's oxygen to be weaned to maintain sats of >90%.

## 2017-12-05 NOTE — PLAN OF CARE
Problem: Physical Therapy Goal  Goal: Physical Therapy Goal  Goals to be met by: 17     Patient will increase functional independence with mobility by performin. Supine <> sit with Maximum Assistance  2. Sit to stand transfer with Moderate Assistance MET 17  REVISED: CGA  3. Bed to chair transfer with Moderate Assistance using Rolling Walker  4. Gait  x 5 feet with Minimal Assistance using Rolling Walker.   5. Sitting at edge of bed x 15 minutes with Stand-by Assistance MET 17  REVISED: Supervision  6. Lower extremity exercise program x 10 reps per handout, with assistance as needed MET 17        Outcome: Ongoing (interventions implemented as appropriate)  Goal 6 met

## 2017-12-05 NOTE — PT/OT/SLP PROGRESS
Physical Therapy Treatment    Patient Name:  Sung Buckley   MRN:  [id number]    Recommendations:     Discharge Recommendations:  nursing facility, skilled   Discharge Equipment Recommendations:  (defer to SNF)   Barriers to discharge: Decreased caregiver support    Assessment:     Sung Buckley is a 72 y.o. male admitted with a medical diagnosis of Closed subcapital fracture of right femur with routine healing.  He presents with the following impairments/functional limitations:  weakness, impaired endurance, impaired functional mobilty, decreased lower extremity function, pain, decreased ROM, orthopedic precautions increased lethargy and fatigue this PM with v/c's to stay alert.    Rehab Prognosis:  Good; patient would benefit from acute skilled PT services to address these deficits and reach maximum level of function.      Recent Surgery: Procedure(s) (LRB):  ARTHROPLASTY-HIP-BURT (Right) 12 Days Post-Op    Plan:     During this hospitalization, patient to be seen BID (M-F as tolerated) to address the above listed problems via gait training, therapeutic activities, therapeutic exercises  · Plan of Care Expires:  12/24/17   Plan of Care Reviewed with: patient, spouse    Subjective     Communicated with nsg prior to session.  Patient found supine upon PT entry to room, agreeable to treatment.      Chief Complaint: n/a  Patient comments/goals: n/a  Pain/Comfort:  · Pain Rating 1:  (no rating)  · Location - Side 1: Right  · Location - Orientation 1: generalized  · Location 1: hip  · Pain Addressed 1: Reposition, Distraction    Patients cultural, spiritual, Christian conflicts given the current situation: none    Objective:     Patient found with: telemetry, SCD (pillow between knees)     General Precautions: Standard, fall   Orthopedic Precautions:RLE weight bearing as tolerated, RLE posterior precautions   Braces:  (ABD pillow)     Functional Mobility:  · Transfers:         AM-PAC 6 CLICK MOBILITY  Turning over in bed  (including adjusting bedclothes, sheets and blankets)?: 2  Sitting down on and standing up from a chair with arms (e.g., wheelchair, bedside commode, etc.): 2  Moving from lying on back to sitting on the side of the bed?: 2  Moving to and from a bed to a chair (including a wheelchair)?: 2  Need to walk in hospital room?: 1  Climbing 3-5 steps with a railing?: 1  Total Score: 10       Therapeutic Activities and Exercises: le supine ex's X 15 reps inc: ap,qs,hs,abd/add,slr with CGA and v/c's to stay alert       Patient left supine with all lines intact, call button in reach and wife present..    GOALS: see general POC   Physical Therapy Goals        Problem: Physical Therapy Goal    Goal Priority Disciplines Outcome Goal Variances Interventions   Physical Therapy Goal     PT/OT, PT Ongoing (interventions implemented as appropriate)     Description:  Goals to be met by: 17     Patient will increase functional independence with mobility by performin. Supine <> sit with Maximum Assistance  2. Sit to stand transfer with Moderate Assistance MET 17  REVISED: CGA  3. Bed to chair transfer with Moderate Assistance using Rolling Walker  4. Gait  x 5 feet with Minimal Assistance using Rolling Walker.   5. Sitting at edge of bed x 15 minutes with Stand-by Assistance MET 17  REVISED: Supervision  6. Lower extremity exercise program x 10 reps per handout, with assistance as needed MET 17                 Problem: Physical Therapy Goal    Goal Priority Disciplines Outcome Goal Variances Interventions   Physical Therapy Goal     PT/OT, PT Ongoing (interventions implemented as appropriate)                     Time Tracking:     PT Received On: 17  PT Start Time: 1545     PT Stop Time: 1600  PT Total Time (min): 15 min     Billable Minutes: Therapeutic Exercise 15    Treatment Type: Treatment  PT/PTA: PTA     PTA Visit Number: 1     Rickey Hernandez, PTA  2017

## 2017-12-05 NOTE — PLAN OF CARE
Problem: Physical Therapy Goal  Goal: Physical Therapy Goal  Goals to be met by: 17     Patient will increase functional independence with mobility by performin. Supine <> sit with Maximum Assistance  2. Sit to stand transfer with Moderate Assistance MET 17  REVISED: CGA  3. Bed to chair transfer with Moderate Assistance using Rolling Walker  4. Gait  x 5 feet with Minimal Assistance using Rolling Walker.   5. Sitting at edge of bed x 15 minutes with Stand-by Assistance MET 17  REVISED: Supervision  6. Lower extremity exercise program x 10 reps per handout, with assistance as needed        Outcome: Ongoing (interventions implemented as appropriate)  Pt progressing toward goals; tolerated increased standing trials, taking a few steps forward/backward and around to seated surface with RW; will cont with POC.

## 2017-12-05 NOTE — PROGRESS NOTES
Pt being weaned from high flow oxygen; LTAC referral sent yesterday . Tn called  Earlene at [facility] to inquire on status and Earlene informed Tn pt accepted but awaiting insurance authorization. TN informed KEN Fernandez of above.

## 2017-12-06 PROBLEM — R19.7 DIARRHEA: Status: RESOLVED | Noted: 2017-12-04 | Resolved: 2017-12-06

## 2017-12-06 LAB
ANION GAP SERPL CALC-SCNC: 9 MMOL/L
BUN SERPL-MCNC: 14 MG/DL
CALCIUM SERPL-MCNC: 9.5 MG/DL
CHLORIDE SERPL-SCNC: 106 MMOL/L
CO2 SERPL-SCNC: 25 MMOL/L
CREAT SERPL-MCNC: 0.9 MG/DL
ERYTHROCYTE [DISTWIDTH] IN BLOOD BY AUTOMATED COUNT: 14.6 %
EST. GFR  (AFRICAN AMERICAN): >60 ML/MIN/1.73 M^2
EST. GFR  (NON AFRICAN AMERICAN): >60 ML/MIN/1.73 M^2
GLUCOSE SERPL-MCNC: 123 MG/DL
HCT VFR BLD AUTO: 37 %
HGB BLD-MCNC: 11.9 G/DL
MCH RBC QN AUTO: 28.7 PG
MCHC RBC AUTO-ENTMCNC: 32.2 G/DL
MCV RBC AUTO: 89 FL
PLATELET # BLD AUTO: 730 K/UL
PMV BLD AUTO: 8.5 FL
POTASSIUM SERPL-SCNC: 4.5 MMOL/L
RBC # BLD AUTO: 4.15 M/UL
SODIUM SERPL-SCNC: 140 MMOL/L
WBC # BLD AUTO: 10.66 K/UL

## 2017-12-06 PROCEDURE — 97530 THERAPEUTIC ACTIVITIES: CPT

## 2017-12-06 PROCEDURE — 94640 AIRWAY INHALATION TREATMENT: CPT

## 2017-12-06 PROCEDURE — 94664 DEMO&/EVAL PT USE INHALER: CPT

## 2017-12-06 PROCEDURE — 25000003 PHARM REV CODE 250: Performed by: NURSE PRACTITIONER

## 2017-12-06 PROCEDURE — 25000242 PHARM REV CODE 250 ALT 637 W/ HCPCS: Performed by: HOSPITALIST

## 2017-12-06 PROCEDURE — 63600175 PHARM REV CODE 636 W HCPCS: Performed by: HOSPITALIST

## 2017-12-06 PROCEDURE — 94761 N-INVAS EAR/PLS OXIMETRY MLT: CPT

## 2017-12-06 PROCEDURE — 11000001 HC ACUTE MED/SURG PRIVATE ROOM

## 2017-12-06 PROCEDURE — 97535 SELF CARE MNGMENT TRAINING: CPT

## 2017-12-06 PROCEDURE — 36415 COLL VENOUS BLD VENIPUNCTURE: CPT

## 2017-12-06 PROCEDURE — 80048 BASIC METABOLIC PNL TOTAL CA: CPT

## 2017-12-06 PROCEDURE — 25000003 PHARM REV CODE 250: Performed by: HOSPITALIST

## 2017-12-06 PROCEDURE — 27000221 HC OXYGEN, UP TO 24 HOURS

## 2017-12-06 PROCEDURE — 99900035 HC TECH TIME PER 15 MIN (STAT)

## 2017-12-06 PROCEDURE — 85027 COMPLETE CBC AUTOMATED: CPT

## 2017-12-06 RX ORDER — IBUPROFEN 600 MG/1
600 TABLET ORAL EVERY 6 HOURS PRN
Start: 2017-12-06 | End: 2017-12-16

## 2017-12-06 RX ORDER — ACETAMINOPHEN 325 MG/1
650 TABLET ORAL EVERY 6 HOURS PRN
Refills: 0 | COMMUNITY
Start: 2017-12-06 | End: 2017-12-16

## 2017-12-06 RX ORDER — FLUCONAZOLE 100 MG/1
100 TABLET ORAL DAILY
Qty: 7 TABLET | Refills: 0
Start: 2017-12-07 | End: 2017-12-14

## 2017-12-06 RX ORDER — LEVETIRACETAM 750 MG/1
750 TABLET ORAL 2 TIMES DAILY
Qty: 60 TABLET | Refills: 3 | Status: SHIPPED | OUTPATIENT
Start: 2017-12-06

## 2017-12-06 RX ORDER — ENOXAPARIN SODIUM 100 MG/ML
40 INJECTION SUBCUTANEOUS DAILY
Qty: 6.8 ML | Refills: 0
Start: 2017-12-06 | End: 2017-12-23

## 2017-12-06 RX ADMIN — GEMFIBROZIL 600 MG: 600 TABLET ORAL at 08:12

## 2017-12-06 RX ADMIN — Medication 10 ML: at 05:12

## 2017-12-06 RX ADMIN — FOLIC ACID 1 MG: 1 TABLET ORAL at 08:12

## 2017-12-06 RX ADMIN — POTASSIUM & SODIUM PHOSPHATES POWDER PACK 280-160-250 MG 2 PACKET: 280-160-250 PACK at 07:12

## 2017-12-06 RX ADMIN — ENOXAPARIN SODIUM 40 MG: 100 INJECTION SUBCUTANEOUS at 08:12

## 2017-12-06 RX ADMIN — DONEPEZIL HYDROCHLORIDE 10 MG: 5 TABLET, FILM COATED ORAL at 05:12

## 2017-12-06 RX ADMIN — POTASSIUM & SODIUM PHOSPHATES POWDER PACK 280-160-250 MG 2 PACKET: 280-160-250 PACK at 05:12

## 2017-12-06 RX ADMIN — IBUPROFEN 600 MG: 600 TABLET, FILM COATED ORAL at 07:12

## 2017-12-06 RX ADMIN — FLUCONAZOLE 100 MG: 100 TABLET ORAL at 08:12

## 2017-12-06 RX ADMIN — ROSUVASTATIN CALCIUM 10 MG: 10 TABLET, FILM COATED ORAL at 05:12

## 2017-12-06 RX ADMIN — LEVETIRACETAM 750 MG: 750 TABLET ORAL at 07:12

## 2017-12-06 RX ADMIN — FOLIC ACID 1 MG: 1 TABLET ORAL at 07:12

## 2017-12-06 RX ADMIN — LEVETIRACETAM 750 MG: 750 TABLET ORAL at 08:12

## 2017-12-06 RX ADMIN — CITALOPRAM HYDROBROMIDE 20 MG: 20 TABLET ORAL at 05:12

## 2017-12-06 RX ADMIN — PANTOPRAZOLE SODIUM 40 MG: 40 TABLET, DELAYED RELEASE ORAL at 08:12

## 2017-12-06 RX ADMIN — MEMANTINE HYDROCHLORIDE 10 MG: 5 TABLET ORAL at 07:12

## 2017-12-06 RX ADMIN — POTASSIUM & SODIUM PHOSPHATES POWDER PACK 280-160-250 MG 2 PACKET: 280-160-250 PACK at 01:12

## 2017-12-06 RX ADMIN — LEVOTHYROXINE SODIUM 25 MCG: 25 TABLET ORAL at 05:12

## 2017-12-06 RX ADMIN — Medication 10 ML: at 07:12

## 2017-12-06 RX ADMIN — Medication 10 ML: at 03:12

## 2017-12-06 RX ADMIN — POTASSIUM & SODIUM PHOSPHATES POWDER PACK 280-160-250 MG 2 PACKET: 280-160-250 PACK at 08:12

## 2017-12-06 RX ADMIN — IPRATROPIUM BROMIDE AND ALBUTEROL SULFATE 3 ML: .5; 3 SOLUTION RESPIRATORY (INHALATION) at 11:12

## 2017-12-06 RX ADMIN — IPRATROPIUM BROMIDE AND ALBUTEROL SULFATE 3 ML: .5; 3 SOLUTION RESPIRATORY (INHALATION) at 07:12

## 2017-12-06 NOTE — PLAN OF CARE
Problem: Occupational Therapy Goal  Goal: Occupational Therapy Goal  Goals to be met by: 12/24     Patient will increase functional independence with ADLs by performing:    Feeding with Set-up Assistance. --MET 11/28  Grooming while seated with Minimal Assistance. --MET 12/6  Sitting at edge of bed x10 minutes with Moderate Assistance. --MET 11/27  Rolling to Bilateral with Moderate Assistance.   Supine to sit with Maximum Assistance. --MET 12/6  Toilet transfer to bedside commode with Maximum Assistance.       Outcome: Ongoing (interventions implemented as appropriate)  Patient with improved activity tolerance and alertness this date. Will benefit from continued skilled OT to address functional deficits.

## 2017-12-06 NOTE — PLAN OF CARE
Problem: Physical Therapy Goal  Goal: Physical Therapy Goal  Goals to be met by: 17     Patient will increase functional independence with mobility by performin. Supine <> sit with Maximum Assistance MET 17  2. Sit to stand transfer with Moderate Assistance MET 17  REVISED: CGA  3. Bed to chair transfer with Moderate Assistance using Rolling Walker  4. Gait  x 5 feet with Minimal Assistance using Rolling Walker.   5. Sitting at edge of bed x 15 minutes with Stand-by Assistance MET 17  REVISED: Supervision  6. Lower extremity exercise program x 10 reps per handout, with assistance as needed MET 17         Outcome: Ongoing (interventions implemented as appropriate)  Goal 1 met

## 2017-12-06 NOTE — PROGRESS NOTES
Follow-up With  Details  Why  Contact Info   Abimael Hallman MD  Go on 12/18/2017  @ [address] [place] [address] [address]  [place] [id number] [phone]   [facility]  On 12/6/2017  [facility]  [address]  [place] [id number] [phone]

## 2017-12-06 NOTE — PLAN OF CARE
[facility]  Dimitrios Duke MD, [facility]     MD Bruce Bond FNP Renee Melancon, PA-C Rosanne Zeringue, NP  [address] [place] [address]  Office: [phone]  Fax: [phone]       NURSING HOME ORDERS    Patient Name: Sung Buckley  YOB: 1945    12/07/2017    Admit to Nursing Home:  Skilled Bed  [facility] ([address], [place] [address])                                                 Diagnoses:  Active Hospital Problems    Diagnosis  POA    *Closed subcapital fracture of right femur with routine healing [S72.011D]  Not Applicable    Oral thrush [B37.0]  No    Acute respiratory failure with hypoxia [J96.01]  Yes    S/P right hip hemiarthroplasty [Z96.649]  Not Applicable    Seizure disorder [G40.909]  Yes     Chronic    Iron deficiency anemia secondary to inadequate dietary iron intake [D50.8]  Yes    Oropharyngeal dysphagia [R13.12]  Yes     Chronic    Gait disorder [R26.9]  Yes    Polyneuropathy [G62.9]  Yes     Chronic    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, 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, F02.80]  Yes     Chronic    Depression with anxiety [F41.8]  Yes    Mixed hyperlipidemia [E78.2]  Yes     Chronic      Resolved Hospital Problems    Diagnosis Date Resolved POA    Diarrhea [R19.7] 12/06/2017 No    Rash of back [R21] 12/02/2017 No    Somnolence [R40.0] 12/03/2017 No    Hypokalemia [E87.6] 11/29/2017 No    Hypophosphatemia [E83.39] 11/29/2017 Yes    Aspiration pneumonia of both lungs [J69.0] 12/04/2017 Yes    Fall [W19.XXXA] 11/28/2017 Yes    Leukocytosis [D72.829] 11/23/2017 Yes     Allergies:Review of patient's allergies indicates:  No Known Allergies      Discharge Procedure Orders  Diet Adult Regular   Order Comments: Nectar thickened liquids.  Finely chopped meats, Extra gravy, NO straws.   Order Specific Question Answer Comments   Additional restrictions: Pureed      Vital signs  per facility protocol     Skin assessment every shift      Activity: Per rehab recommendations     DNR (Do Not Resuscitate)         LABS:  Per facility protocol    Nursing Precautions:     - Aspiration precautions:             - Total assistance with meals            -  Upright 90 degrees befor during and after meals             -  Suction at bedside          - Fall precautions per nursing home protocol   - Seizure precaution per retirement protocol   - Decubitus precautions:        -  for positioning   - Pressure reducing foam mattress   - Turn patient every two hours. Use wedge pillows to anchor patient    CONSULTS:      Physical Therapy to evaluate and treat five times a week     Occupational Therapy to evaluate and treat five times a week     Speech Therapy  to evaluate and treat    Oxygen 2 liters nasal cannula as needed to keep SpO2 greater than 88%    Medications: Discontinue all previous medication orders, if any. See new list below.   Lacho Buckleymy   Home Medication Instructions NANY:[id number]    Printed on:12/06/17 3948   Medication Information                      acetaminophen (TYLENOL) 325 MG tablet  Take 2 tablets (650 mg total) by mouth every 6 (six) hours as needed for Pain.             azelastine (ASTELIN) 137 mcg (0.1 %) nasal spray  USE 1-2 SPRAYS IN EACH NASAL TWICE A DAY FOR CONGESTION             citalopram (CELEXA) 20 MG tablet  Take 20 mg by mouth every Mon, Wed, Fri.              CRESTOR 10 mg tablet  Take 10 mg by mouth every Mon, Wed, Fri.              cyanocobalamin, vitamin B-12, 2,500 mcg Tab  Take 1 tablet by mouth once daily.             docusate sodium (COLACE) 100 MG capsule  Take 1 capsule (100 mg total) by mouth 2 (two) times daily.             donepezil (ARICEPT) 10 MG tablet  TAKE 1 TABLET BY MOUTH EVERY MORNING WITH FOOD             enoxaparin (LOVENOX) 40 mg/0.4 mL Syrg  Inject 0.4 mLs (40 mg total) into the skin once daily. Until 12/23/17 (or can give oral  anticoagulant instead for DVT prophylaxis post-hip repair)             esomeprazole (NEXIUM) 20 MG capsule  Take 20 mg by mouth once daily.              ferrous sulfate 325 mg (65 mg iron) Tab tablet  Take 1 tablet by mouth 3 (three) times daily with meals.             fluconazole (DIFLUCAN) 100 MG tablet  Take 1 tablet (100 mg total) by mouth once daily. Until 12/14/17 for oral thrush.             folic acid (FOLVITE) 1 MG tablet  TAKE 1 TABLET BY MOUTH TWICE A DAY FOR NEUROPATHY             gabapentin (NEURONTIN) 100 MG capsule  Take 100 mg by mouth 3 (three) times daily.              gemfibrozil (LOPID) 600 MG tablet  Take 600 mg by mouth once daily.             ibuprofen (ADVIL,MOTRIN) 600 MG tablet  Take 1 tablet (600 mg total) by mouth every 6 (six) hours as needed (joint pain).             levETIRAcetam (KEPPRA) 750 MG Tab  Take 1 tablet (750 mg total) by mouth 2 (two) times daily.             levothyroxine (SYNTHROID) 25 MCG tablet  Take 1 tablet (25 mcg total) by mouth before breakfast.             memantine (NAMENDA) 10 MG Tab  TAKE 1 TABLET BY MOUTH TWICE A DAY                   Dimitrios Duke MD  12/07/2017

## 2017-12-06 NOTE — SUBJECTIVE & OBJECTIVE
Interval History: On only 2 liters nasal cannula now.    Review of Systems   Unable to perform ROS: Dementia     Objective:     Vital Signs (Most Recent):  Temp: 97.3 °F (36.3 °C) (12/06/17 0533)  Pulse: 63 (12/06/17 0725)  Resp: 17 (12/06/17 0725)  BP: (!) 145/63 (12/06/17 0533)  SpO2: (!) 94 % (12/06/17 0725) Vital Signs (24h Range):  Temp:  [96.7 °F (35.9 °C)-98 °F (36.7 °C)] 97.3 °F (36.3 °C)  Pulse:  [57-69] 63  Resp:  [17-18] 17  SpO2:  [93 %-98 %] 94 %  BP: (107-145)/(55-71) 145/63     Weight: 82.3 kg (181 lb 7 oz)  Body mass index is 25.32 kg/m².    Intake/Output Summary (Last 24 hours) at 12/06/17 1018  Last data filed at 12/05/17 1800   Gross per 24 hour   Intake              441 ml   Output                0 ml   Net              441 ml      Physical Exam   Constitutional: He appears well-developed and well-nourished. No distress. Nasal cannula in place.   Cardiovascular: Normal rate and regular rhythm.    No murmur heard.  Pulmonary/Chest: Effort normal. No respiratory distress. He has no wheezes.   Abdominal: Soft. Bowel sounds are normal. He exhibits no distension. There is no tenderness.   Musculoskeletal: He exhibits no edema or tenderness.   Neurological: He is alert. He is disoriented.   Skin: Skin is warm and dry.   Nursing note and vitals reviewed.      Significant Labs:   CBC:     Recent Labs  Lab 12/06/17  0639   WBC 10.66   HGB 11.9*   HCT 37.0*   *     CMP:     Recent Labs  Lab 12/06/17  0639      K 4.5      CO2 25   *   BUN 14   CREATININE 0.9   CALCIUM 9.5   ANIONGAP 9   EGFRNONAA >60       Significant Imaging: CXR: I have reviewed all pertinent results/findings within the past 24 hours and my personal findings are:  Improved bilateral infiltrates

## 2017-12-06 NOTE — PROGRESS NOTES
[facility] Hospital Medicine  Progress Note    Patient Name: Sung Buckley  MRN: [id number]  Patient Class: IP- Inpatient   Admission Date: 11/22/2017  Length of Stay: 14 days  Attending Physician: Jack White MD  Primary Care Provider: Maren Monterroso MD        Subjective:     Principal Problem:Closed subcapital fracture of right femur with routine healing    HPI:  Sung Buckley is a 72 y.o. white man with seizure disorder, hyperlipidemia, Alzheimer's and vascular dementia, dysphagia, neuropathy, and depression.  He lives in [place], [place].  He is .  His primary care physician is Dr. Maren Monterroso.  His neurologist is Dr. Bernardo Carrera.     He was taken to [facility] on 11/22/17 with right hip pain after a fall at home.  His wife reported that she was cooking breakfast when she heard him calling out for her and complaining of pain.  He sleeps in a hospital bed and the rails were up at the time.  She found him on the floor and he said that he was trying to get up to go to the bathroom.  She said that he normally calls for her assistance, but he did not this time.  He has a walker and wheelchair that he uses to get around the house with and the wheelchair was beside his bed, but he was not using it at this time.  He denied any loss of consciousness, light headedness, palpitations or chest pain prior to the fall.  He complained of pain in the right hip that is constant, sharp, and 8/10 in intensity.     Hospital Course:  He required supplemental oxygen on admission and had fever overnight the first night prior to going to the operating room.  He had right hemiarthroplasty by Dr. Abimael Hallman on 11/23/17 and was monitored in the ICU postoperatively.  He was started on piperacillin-tazobactam for possible aspiration pneumonia.  Respiratory status worsened and required comfort flow at 85% FiO2.  CTA chest was negative for pulmonary embolism, but did show  multilobar opacities predominantly in the lower lobes that likely represent pneumonia vs pneumonitis.  Vancomycin was added as he continued to have recurrent fever.  Modified barium swallow study was performed on 11/27/17 and dental soft diet with nectar thickened liquids was recommended.  His wife noticed him to be increasingly somnolent.  ABG on 11/29/17 showed hypoxia but no hypercapnia.  Follow-up chest x-ray showed no change.  His wife noticed oral thrush and observed him reaching for parts of his body as if he was in pain.  He was given ketorolac and rested afterward.  On the night of 11/30/17 his wife noted that he did not have restful sleep.  He had not had delirium precautions in place, either, which were started.  Neurology was consulted and his wife told them that he has episodes of staring at home.  They recommended increasing his levetiracetam dose.  EEG showed normal brain activity.  Quetiapine 12.5 mg was tried at night, but it caused agitation.  He participated with Physical/Occupational Therapy but wanted to get back into the bed.  His wife reported that ibuprofen works well for his pain at home, so this was given as needed.  He completed 8 days of both piperacillin-tazobactam and vancomycin.  He remained afebrile without leukocytosis, but on 12/3/17 had severe diarrhea.  He had no abdominal pain or tenderness.  C.diff was negative. Diarrhea improved.  His supplemental oxygen was changed to low flow nasal cannula at 2 liters on 12/6/17, so he was discharged to [facility] skilled nursing facility to continue therapy.  He will continue anticoagulation for DVT prophylaxis post-hip repair.    Interval History: On only 2 liters nasal cannula now.    Review of Systems   Unable to perform ROS: Dementia     Objective:     Vital Signs (Most Recent):  Temp: 97.3 °F (36.3 °C) (12/06/17 0533)  Pulse: 63 (12/06/17 0725)  Resp: 17 (12/06/17 0725)  BP: (!) 145/63 (12/06/17 0533)  SpO2: (!) 94 %  (12/06/17 0725) Vital Signs (24h Range):  Temp:  [96.7 °F (35.9 °C)-98 °F (36.7 °C)] 97.3 °F (36.3 °C)  Pulse:  [57-69] 63  Resp:  [17-18] 17  SpO2:  [93 %-98 %] 94 %  BP: (107-145)/(55-71) 145/63     Weight: 82.3 kg (181 lb 7 oz)  Body mass index is 25.32 kg/m².    Intake/Output Summary (Last 24 hours) at 12/06/17 1018  Last data filed at 12/05/17 1800   Gross per 24 hour   Intake              441 ml   Output                0 ml   Net              441 ml      Physical Exam   Constitutional: He appears well-developed and well-nourished. No distress. Nasal cannula in place.   Cardiovascular: Normal rate and regular rhythm.    No murmur heard.  Pulmonary/Chest: Effort normal. No respiratory distress. He has no wheezes.   Abdominal: Soft. Bowel sounds are normal. He exhibits no distension. There is no tenderness.   Musculoskeletal: He exhibits no edema or tenderness.   Neurological: He is alert. He is disoriented.   Skin: Skin is warm and dry.   Nursing note and vitals reviewed.      Significant Labs:   CBC:     Recent Labs  Lab 12/06/17  0639   WBC 10.66   HGB 11.9*   HCT 37.0*   *     CMP:     Recent Labs  Lab 12/06/17  0639      K 4.5      CO2 25   *   BUN 14   CREATININE 0.9   CALCIUM 9.5   ANIONGAP 9   EGFRNONAA >60       Significant Imaging: CXR: I have reviewed all pertinent results/findings within the past 24 hours and my personal findings are:  Improved bilateral infiltrates    Assessment/Plan:      * Closed subcapital fracture of right femur with routine healing    S/p right hip hemiarthroplasty  Fall  Dr. Hallman performed hemiarthroplasty 11/23/17. Turn q2. Acetaminophen and ibuprofen prn for pain control. Docusate BID. Lovenox for DVT ppx. PT/OT. Will need SNF placement once respiratory status is improved.         Oral thrush    Started fluconazole on 11/30/17. Plan to treat for 10 days (end date 12/9/17).          Acute respiratory failure with hypoxia    No need to go to LTAC.   Discharge to SNF today and can continue to wean nasal cannula there.          Seizure disorder    Increased levetiracetam 250 mg TID to 750 mg BID per Neurology recommendations.  EEG on 12/1/17 was normal.        Iron deficiency anemia secondary to inadequate dietary iron intake    Ferritin was 16 in 2016. Continue iron supplement.           Oropharyngeal dysphagia    Appreciate SLP assistance.  Dental soft diet with nectar thickened liquids.         Polyneuropathy    Gait disorder  Continue  folic acid and B12. PT/OT post-operatively. Hold gabapentin for now due to lethargy.         Depression with anxiety    Continue citalopram.           Mixed Alzheimer's and vascular dementia    Continue donepezil and memantine.  At high risk for delirium.  Delirium precautions.          Mixed hyperlipidemia    Continue rosuvastatin and gemfibrozil.             VTE Risk Mitigation         Ordered     enoxaparin injection 40 mg  Daily     Route:  Subcutaneous        12/02/17 1706     Place sequential compression device  Until discontinued      11/23/17 1242     High Risk of VTE  Once      11/23/17 1242              Jack White MD  Department of Hospital Medicine   [facility]

## 2017-12-06 NOTE — PT/OT/SLP PROGRESS
"Occupational Therapy   Treatment    Name: Sung Buckley  MRN: [id number]  Admitting Diagnosis:  Closed subcapital fracture of right femur with routine healing  13 Days Post-Op    Recommendations:     Discharge Recommendations: nursing facility, skilled  Discharge Equipment Recommendations:   (Defer to SNF)  Barriers to discharge:  Decreased caregiver support    Subjective     Communicated with: Abimael hobbs prior to session.  Pain/Comfort:  · Pain Rating 1:  (does not rate, but yells "ouch" with WBing)  · Location - Side 1: Right  · Location - Orientation 1: generalized  · Location 1: hip    Objective:     Patient found with: peripheral IV, telemetry, SCD, oxygen (2L O2)    General Precautions: Standard, fall   Orthopedic Precautions:RLE weight bearing as tolerated, RLE posterior precautions   Braces:  (ABD pillow)     Bed Mobility:    · Patient completed Scooting/Bridging with moderate assistance  · Patient completed Supine to Sit with moderate assistance and with side rail  · Patient completed Sit to Supine with maximal assistance     Functional Mobility/Transfers:  · Patient completed Sit <> Stand Transfer with minimum assistance and moderate assistance  with  rolling walker     Activities of Daily Living:  · Grooming: contact guard assistance for seated balance while patient brushed teeth and washed face    Patient left HOB elevated with all lines intact, call button in reach, bed alarm on, RN notified and wife present    [facility] 6 Click:  [facility] Total Score: 10    Treatment & Education:  Bed mob as noted above. Sit <> stand with RW with Min-mod (A) and max VCs for posture. EOB for ADL tasks as above. Patient able to take 3-4 steps forward/backward/lateral with min (A) of 2 using RW.   Education:    Assessment:   Patient with improved activity tolerance and alertness this date. Will benefit from continued skilled OT to address functional deficits.     Sung Buckley is a 72 y.o. male with a medical diagnosis of Closed " subcapital fracture of right femur with routine healing. Performance deficits affecting function are weakness, impaired endurance, impaired self care skills, impaired functional mobilty, gait instability, impaired balance, decreased upper extremity function, decreased lower extremity function, decreased ROM, decreased safety awareness, impaired cognition, pain, orthopedic precautions.      Rehab Prognosis:  Fair; patient would benefit from acute skilled OT services to address these deficits and reach maximum level of function.       Plan:     Patient to be seen 5 x/week to address the above listed problems via self-care/home management, therapeutic activities, therapeutic exercises  · Plan of Care Expires: 12/24/17  · Plan of Care Reviewed with: patient, spouse    This Plan of care has been discussed with the patient who was involved in its development and understands and is in agreement with the identified goals and treatment plan    GOALS:    Occupational Therapy Goals        Problem: Occupational Therapy Goal    Goal Priority Disciplines Outcome Interventions   Occupational Therapy Goal     OT, PT/OT Ongoing (interventions implemented as appropriate)    Description:  Goals to be met by: 12/24     Patient will increase functional independence with ADLs by performing:    Feeding with Set-up Assistance. --MET 11/28  Grooming while seated with Minimal Assistance. --MET 12/6  Sitting at edge of bed x10 minutes with Moderate Assistance. --MET 11/27  Rolling to Bilateral with Moderate Assistance.   Supine to sit with Maximum Assistance. --MET 12/6  Toilet transfer to bedside commode with Maximum Assistance.                         Time Tracking:     OT Date of Treatment: 12/06/17  OT Start Time: 0945  OT Stop Time: 1019  OT Total Time (min): 34 min co-tx with PTA    Billable Minutes:Self Care/Home Management 17    MATTY Matamoros  12/6/2017

## 2017-12-06 NOTE — PROGRESS NOTES
Per [facility] admit coordinatorCarolina they are not able to admit patient today.  Carolina reported that she has to schedule a time for family to sign admit paper work and obtain an authorization for SNF admission.     attempted to make phone contact Latonia Nathaniel to informed her that patient does not need LTAC, he is appropriate for SNF.   was not available left voice message.

## 2017-12-06 NOTE — PLAN OF CARE
Paperwork to go to SNF will not be complete today, so he will wait until tomorrow to be discharged.

## 2017-12-06 NOTE — PT/OT/SLP PROGRESS
Physical Therapy Treatment    Patient Name:  Sung Buckley   MRN:  [id number]    Recommendations:     Discharge Recommendations:  nursing facility, skilled   Discharge Equipment Recommendations:  (defer to SNF)   Barriers to discharge: Decreased caregiver support    Assessment:     Sung Buckley is a 72 y.o. male admitted with a medical diagnosis of Closed subcapital fracture of right femur with routine healing.  He presents with the following impairments/functional limitations:  weakness, impaired endurance, impaired functional mobilty, gait instability, impaired balance, decreased upper extremity function, decreased lower extremity function, pain, decreased ROM, impaired coordination, impaired fine motor, impaired cardiopulmonary response to activity, orthopedic precautions .    Rehab Prognosis:  Good; patient would benefit from acute skilled PT services to address these deficits and reach maximum level of function.      Recent Surgery: Procedure(s) (LRB):  ARTHROPLASTY-HIP-BURT (Right) 13 Days Post-Op    Plan:     During this hospitalization, patient to be seen BID (M-F) to address the above listed problems via gait training, therapeutic activities, therapeutic exercises  · Plan of Care Expires:  12/24/17   Plan of Care Reviewed with: patient, spouse    Subjective     Communicated with nsg prior to session.  Patient found supine upon PT entry to room, agreeable to treatment.      Chief Complaint: fatigue  Patient comments/goals: n/a  Pain/Comfort:  · Pain Rating 1:  (no rating)  · Location - Side 1: Right  · Location - Orientation 1: generalized  · Location 1: hip  · Pain Addressed 1: Reposition, Distraction    Patients cultural, spiritual, Yarsanism conflicts given the current situation: none    Objective:     Patient found with: oxygen, telemetry, SCD     General Precautions: Standard, fall   Orthopedic Precautions:RLE weight bearing as tolerated, RLE posterior precautions   Braces:  (ABD pillow)     Functional  Mobility:  · Bed Mobility:     · Rolling Left:  moderate assistance  · Supine to Sit: moderate assistance and with HR  · Sit to Supine: maximal assistance  · Transfers:     · Sit to Stand:  minimum assistance and moderate assistance with rolling walker  · Gait: ambulated 2-3 steps up/back and sideways to HOB with Min A X 2 and v/c's  · Balance: sitting balance with SBA/CGA      AM-PAC 6 CLICK MOBILITY  Turning over in bed (including adjusting bedclothes, sheets and blankets)?: 2  Sitting down on and standing up from a chair with arms (e.g., wheelchair, bedside commode, etc.): 2  Moving from lying on back to sitting on the side of the bed?: 2  Moving to and from a bed to a chair (including a wheelchair)?: 2  Need to walk in hospital room?: 1  Climbing 3-5 steps with a railing?: 1  Total Score: 10       Therapeutic Activities and Exercises: le AA seated laq and hip flex X 10 reps,sat EOB ~15 mins with SBA/CGA,skills performed with OT       Patient left supine with all lines intact, call button in reach, nsg notified and wife present..    GOALS: see general POC   Physical Therapy Goals        Problem: Physical Therapy Goal    Goal Priority Disciplines Outcome Goal Variances Interventions   Physical Therapy Goal     PT/OT, PT Ongoing (interventions implemented as appropriate)     Description:  Goals to be met by: 17     Patient will increase functional independence with mobility by performin. Supine <> sit with Maximum Assistance MET 17  2. Sit to stand transfer with Moderate Assistance MET 17  REVISED: CGA  3. Bed to chair transfer with Moderate Assistance using Rolling Walker  4. Gait  x 5 feet with Minimal Assistance using Rolling Walker.   5. Sitting at edge of bed x 15 minutes with Stand-by Assistance MET 17  REVISED: Supervision  6. Lower extremity exercise program x 10 reps per handout, with assistance as needed MET 17                  Problem: Physical Therapy Goal    Goal  Priority Disciplines Outcome Goal Variances Interventions   Physical Therapy Goal     PT/OT, PT Ongoing (interventions implemented as appropriate)                     Time Tracking:     PT Received On: 12/06/17  PT Start Time: 0945     PT Stop Time: 1019  PT Total Time (min): 34 min     Billable Minutes: Therapeutic Activity 17 and Total Time 34    Treatment Type: Treatment (co-rx with OT)  PT/PTA: PTA     PTA Visit Number: 2     Rickey Hernandez, CRISTEL  12/06/2017

## 2017-12-06 NOTE — PROGRESS NOTES
faxed transfer facility orders and updated clinicals to [facility] admit coordinator, Carolina to review for admission today.       made phone contact with Carolina at [facility] and informed her patient is ready for discharge today.  Carolina reported she is not sure they patient will be accepted for admission today.  She has to schedule a time for family to sign admit paper work and obtain insurance authorization.   request that she follow up regarding status of admission, because there are three other facilities that have accepted patient for skilled nursing facility placement.

## 2017-12-06 NOTE — PLAN OF CARE
Problem: Patient Care Overview  Goal: Plan of Care Review  Outcome: Ongoing (interventions implemented as appropriate)  Pt on oxygen in no apparent distress.  Breathing and acapella tx. Given with ok pt. Effort.  Will cont. To monitor.

## 2017-12-07 VITALS
TEMPERATURE: 98 F | WEIGHT: 181.44 LBS | OXYGEN SATURATION: 96 % | SYSTOLIC BLOOD PRESSURE: 112 MMHG | HEIGHT: 71 IN | RESPIRATION RATE: 16 BRPM | DIASTOLIC BLOOD PRESSURE: 78 MMHG | HEART RATE: 59 BPM | BODY MASS INDEX: 25.4 KG/M2

## 2017-12-07 PROCEDURE — 94761 N-INVAS EAR/PLS OXIMETRY MLT: CPT

## 2017-12-07 PROCEDURE — 25000003 PHARM REV CODE 250: Performed by: HOSPITALIST

## 2017-12-07 PROCEDURE — 97530 THERAPEUTIC ACTIVITIES: CPT

## 2017-12-07 PROCEDURE — 94664 DEMO&/EVAL PT USE INHALER: CPT

## 2017-12-07 PROCEDURE — 99900035 HC TECH TIME PER 15 MIN (STAT)

## 2017-12-07 PROCEDURE — 27000221 HC OXYGEN, UP TO 24 HOURS

## 2017-12-07 PROCEDURE — 63600175 PHARM REV CODE 636 W HCPCS: Performed by: HOSPITALIST

## 2017-12-07 RX ADMIN — LEVETIRACETAM 750 MG: 750 TABLET ORAL at 08:12

## 2017-12-07 RX ADMIN — Medication 10 ML: at 05:12

## 2017-12-07 RX ADMIN — POTASSIUM & SODIUM PHOSPHATES POWDER PACK 280-160-250 MG 2 PACKET: 280-160-250 PACK at 08:12

## 2017-12-07 RX ADMIN — FOLIC ACID 1 MG: 1 TABLET ORAL at 08:12

## 2017-12-07 RX ADMIN — DONEPEZIL HYDROCHLORIDE 10 MG: 5 TABLET, FILM COATED ORAL at 05:12

## 2017-12-07 RX ADMIN — FLUCONAZOLE 100 MG: 100 TABLET ORAL at 08:12

## 2017-12-07 RX ADMIN — PANTOPRAZOLE SODIUM 40 MG: 40 TABLET, DELAYED RELEASE ORAL at 08:12

## 2017-12-07 RX ADMIN — LEVOTHYROXINE SODIUM 25 MCG: 25 TABLET ORAL at 05:12

## 2017-12-07 RX ADMIN — Medication 10 ML: at 08:12

## 2017-12-07 RX ADMIN — ENOXAPARIN SODIUM 40 MG: 100 INJECTION SUBCUTANEOUS at 08:12

## 2017-12-07 RX ADMIN — GEMFIBROZIL 600 MG: 600 TABLET ORAL at 08:12

## 2017-12-07 NOTE — PLAN OF CARE
TN spoke to Juan Antonio at [facility] who confirmed all paperwork completed by spouse and authorization from [employer] received. Pt going to room 137 A and instructed to have nurse call report to Nurse Forde. TN gave d/c packet to [facility]arghese and requested he call report to [facility] @ [phone]. TN arranged transport via [facility] and requested w/c and oxygen. Pt's spouse at bedside and aware transport arranged.     12/07/17 1203   Final Note   Assessment Type Final Discharge Note   Discharge Disposition SNF   What phone number can be called within the next 1-3 days to see how you are doing after discharge? [phone]   Hospital Follow Up  Appt(s) scheduled? (SNF)   Discharge plans and expectations educations in teach back method with documentation complete? Yes   Right Care Referral Info   Post Acute Recommendation SNF / Sub-Acute Rehab   Facility Name [facility]

## 2017-12-07 NOTE — PLAN OF CARE
Problem: Patient Care Overview  Goal: Plan of Care Review  Outcome: Ongoing (interventions implemented as appropriate)  Received pt on 2L; no distress noted

## 2017-12-07 NOTE — PT/OT/SLP PROGRESS
Physical Therapy Treatment    Patient Name:  Sung Buckley   MRN:  [id number]    Recommendations:     Discharge Recommendations:  nursing facility, skilled   Discharge Equipment Recommendations:  (dffer to SNF)   Barriers to discharge: Decreased caregiver support    Assessment:     Sung Buckley is a 72 y.o. male admitted with a medical diagnosis of Closed subcapital fracture of right femur with routine healing.  He presents with the following impairments/functional limitations:  weakness, impaired endurance, impaired functional mobilty, impaired balance, decreased upper extremity function, decreased lower extremity function, decreased ROM, impaired coordination, orthopedic precautions pt remains with poor endurance and will benefit from SNF.    Rehab Prognosis:  Good; patient would benefit from acute skilled PT services to address these deficits and reach maximum level of function.      Recent Surgery: Procedure(s) (LRB):  ARTHROPLASTY-HIP-BURT (Right) 14 Days Post-Op    Plan:     During this hospitalization, patient to be seen BID (M-F) to address the above listed problems via gait training, therapeutic activities, therapeutic exercises  · Plan of Care Expires:  12/24/17   Plan of Care Reviewed with: patient, spouse    Subjective     Communicated with nsg prior to session.  Patient found supine upon PT entry to room, agreeable to treatment.      Chief Complaint: n/a  Patient comments/goals: n/a  Pain/Comfort:  · Pain Rating 1:  (no c/o's)    Patients cultural, spiritual, Baptism conflicts given the current situation: none    Objective:     Patient found with: oxygen     General Precautions: Standard, fall   Orthopedic Precautions:RLE weight bearing as tolerated, RLE posterior precautions   Braces:  (ABD pillow)     Functional Mobility:  · Bed Mobility:     · Supine to Sit: moderate assistance and maximal assistance  · Transfers:     · Sit to Stand:  moderate assistance with rolling walker  · Bed to Chair: moderate  assistance with  rolling walker  using  Stand Pivot      AM-PAC 6 CLICK MOBILITY  Turning over in bed (including adjusting bedclothes, sheets and blankets)?: 2  Sitting down on and standing up from a chair with arms (e.g., wheelchair, bedside commode, etc.): 2  Moving from lying on back to sitting on the side of the bed?: 2  Moving to and from a bed to a chair (including a wheelchair)?: 2  Need to walk in hospital room?: 1  Climbing 3-5 steps with a railing?: 1  Total Score: 10       Therapeutic Activities and Exercises: pt was on 1L/O2,sats on rm air at rest 95%,post t/f to w/c 93%,O2 doffed for transfer       Patient left up in chair with nsg notified and pt being transferred to SNF and leaving with transport..    GOALS: see general POC   Physical Therapy Goals     Not on file          Multidisciplinary Problems (Resolved)        Problem: Physical Therapy Goal    Goal Priority Disciplines Outcome Goal Variances Interventions   Physical Therapy Goal   (Resolved)     PT/OT, PT Outcome(s) achieved     Description:  Goals to be met by: 17     Patient will increase functional independence with mobility by performin. Supine <> sit with Maximum Assistance MET 17  2. Sit to stand transfer with Moderate Assistance MET 17  REVISED: CGA  3. Bed to chair transfer with Moderate Assistance using Rolling Walker  4. Gait  x 5 feet with Minimal Assistance using Rolling Walker.   5. Sitting at edge of bed x 15 minutes with Stand-by Assistance MET 17  REVISED: Supervision  6. Lower extremity exercise program x 10 reps per handout, with assistance as needed MET 17                  Problem: Physical Therapy Goal    Goal Priority Disciplines Outcome Goal Variances Interventions   Physical Therapy Goal   (Resolved)     PT/OT, PT Outcome(s) achieved                     Time Tracking:     PT Received On: 17  PT Start Time: 1300     PT Stop Time: 1316  PT Total Time (min): 16 min     Billable  Minutes: Therapeutic Activity 16    Treatment Type: Treatment  PT/PTA: PTA     PTA Visit Number: 3     Rickey Hernandez, PTA  12/07/2017

## 2017-12-07 NOTE — PT/OT/SLP DISCHARGE
Occupational Therapy Discharge Summary    Sung Buckley  MRN: [id number]   Principal Problem: Closed subcapital fracture of right femur with routine healing      Patient Discharged from acute Occupational Therapy on 12/7/17.  Please refer to prior OT note dated 12/6/17 for functional status.    Assessment:      Patient appropriate for care in another setting.    Objective:     GOALS:    Occupational Therapy Goals     Not on file          Multidisciplinary Problems (Resolved)        Problem: Occupational Therapy Goal    Goal Priority Disciplines Outcome Interventions   Occupational Therapy Goal   (Resolved)     OT, PT/OT Outcome(s) achieved    Description:  Goals to be met by: 12/24     Patient will increase functional independence with ADLs by performing:    Feeding with Set-up Assistance. --MET 11/28  Grooming while seated with Minimal Assistance. --MET 12/6  Sitting at edge of bed x10 minutes with Moderate Assistance. --MET 11/27  Rolling to Bilateral with Moderate Assistance.   Supine to sit with Maximum Assistance. --MET 12/6  Toilet transfer to bedside commode with Maximum Assistance.                         Reasons for Discontinuation of Therapy Services  Transfer to alternate level of care.      Plan:     Patient Discharged to: Skilled Nursing Facility    Elina Baxter, CAM  12/7/2017

## 2017-12-07 NOTE — PLAN OF CARE
Problem: Patient Care Overview  Goal: Plan of Care Review  Outcome: Ongoing (interventions implemented as appropriate)  Pt is AAOx3. Denies pain at this time. Spouse at the bedside. VSS. No acute changes this shift. Safety maintained. Bed in lowest locked position. Will continue to monitor.

## 2017-12-08 ENCOUNTER — PATIENT OUTREACH (OUTPATIENT)
Dept: ADMINISTRATIVE | Facility: CLINIC | Age: 72
End: 2017-12-08

## 2017-12-08 NOTE — PT/OT/SLP DISCHARGE
Physical Therapy Discharge Summary    Name: Sung Buckley  MRN: [id number]   Principal Problem: Closed subcapital fracture of right femur with routine healing     Patient Discharged from acute Physical Therapy on 2017.  Please refer to prior PT noted date on 2017 for functional status.     Assessment:     Patient appropriate for care in another setting.    Objective:     GOALS:    Physical Therapy Goals     Not on file          Multidisciplinary Problems (Resolved)        Problem: Physical Therapy Goal    Goal Priority Disciplines Outcome Goal Variances Interventions   Physical Therapy Goal   (Resolved)     PT/OT, PT Outcome(s) achieved     Description:  Goals to be met by: 17     Patient will increase functional independence with mobility by performin. Supine <> sit with Maximum Assistance MET 17  2. Sit to stand transfer with Moderate Assistance MET 17  REVISED: CGA  3. Bed to chair transfer with Moderate Assistance using Rolling Walker  4. Gait  x 5 feet with Minimal Assistance using Rolling Walker.   5. Sitting at edge of bed x 15 minutes with Stand-by Assistance MET 17  REVISED: Supervision  6. Lower extremity exercise program x 10 reps per handout, with assistance as needed MET 17                  Problem: Physical Therapy Goal    Goal Priority Disciplines Outcome Goal Variances Interventions   Physical Therapy Goal   (Resolved)     PT/OT, PT Outcome(s) achieved                     Reasons for Discontinuation of Therapy Services  Transfer to alternate level of care.      Plan:     Patient Discharged to: Skilled Nursing Facility.    José Partida, PT  2017

## 2017-12-08 NOTE — DISCHARGE SUMMARY
[facility] Hospital Medicine  Discharge Summary      Patient Name: Sung Buckley  MRN: [id number]  Admission Date: 11/22/2017  Hospital Length of Stay: 15 days  Discharge Date and Time: 12/7/2017  1:19 PM  Attending Physician: Dimitrios Duke MD   Discharging Provider: Dimitrios Duke MD  Primary Care Provider: Maren Monterroso MD      HPI:   Sung Buckley is a 72 y.o. white man with seizure disorder, hyperlipidemia, Alzheimer's and vascular dementia, dysphagia, neuropathy, and depression.  He lives in [place], [place].  He is .  His primary care physician is Dr. Maren Monterroso.  His neurologist is Dr. Bernardo Carrera.     He was taken to [facility] on 11/22/17 with right hip pain after a fall at home.  His wife reported that she was cooking breakfast when she heard him calling out for her and complaining of pain.  He sleeps in a hospital bed and the rails were up at the time.  She found him on the floor and he said that he was trying to get up to go to the bathroom.  She said that he normally calls for her assistance, but he did not this time.  He has a walker and wheelchair that he uses to get around the house with and the wheelchair was beside his bed, but he was not using it at this time.  He denied any loss of consciousness, light headedness, palpitations or chest pain prior to the fall.  He complained of pain in the right hip that is constant, sharp, and 8/10 in intensity.     Procedure(s) (LRB):  ARTHROPLASTY-HIP-BURT (Right)      Hospital Course:   He required supplemental oxygen on admission and had fever overnight the first night prior to going to the operating room.  He had right hemiarthroplasty by Dr. Abimael Hallman on 11/23/17 and was monitored in the ICU postoperatively.  He was started on piperacillin-tazobactam for possible aspiration pneumonia.  Respiratory status worsened and required comfort flow at 85% FiO2.  CTA chest was negative for  pulmonary embolism, but did show multilobar opacities predominantly in the lower lobes that likely represent pneumonia vs pneumonitis.  Vancomycin was added as he continued to have recurrent fever.  Modified barium swallow study was performed on 11/27/17 and dental soft diet with nectar thickened liquids was recommended.  His wife noticed him to be increasingly somnolent.  ABG on 11/29/17 showed hypoxia but no hypercapnia.  Follow-up chest x-ray showed no change.  His wife noticed oral thrush and observed him reaching for parts of his body as if he was in pain.  He was given ketorolac and rested afterward.  On the night of 11/30/17 his wife noted that he did not have restful sleep.  He had not had delirium precautions in place, either, which were started.  Neurology was consulted and his wife told them that he has episodes of staring at home.  They recommended increasing his levetiracetam dose.  EEG showed normal brain activity.  Quetiapine 12.5 mg was tried at night, but it caused agitation.  He participated with Physical/Occupational Therapy but wanted to get back into the bed.  His wife reported that ibuprofen works well for his pain at home, so this was given as needed.  He completed 8 days of both piperacillin-tazobactam and vancomycin.  He remained afebrile without leukocytosis, but on 12/3/17 had severe diarrhea.  He had no abdominal pain or tenderness.  C.diff was negative. Diarrhea improved.  His supplemental oxygen was changed to low flow nasal cannula at 2 liters on 12/6/17, so he was discharged to [facility] skilled nursing facility to continue therapy.  He will continue anticoagulation for DVT prophylaxis post-hip repair.     Consults:   Consults         Status Ordering Provider     Inpatient consult to Dietary  Once     Provider:  (Not yet assigned)    Completed AYDE ANTUNEZ     Inpatient consult to Neurology  Once     Provider:  (Not yet assigned)    Completed AYDE ANTUNEZ           * Closed subcapital fracture of right femur with routine healing    S/p right hip hemiarthroplasty  Fall  Dr. Hallman performed hemiarthroplasty 11/23/17. Turn q2. Acetaminophen and ibuprofen prn for pain control. Docusate BID. Lovenox for DVT ppx. PT/OT.         Oral thrush    Started fluconazole on 11/30/17. Plan to treat for 10 days (end date 12/9/17).          Acute respiratory failure with hypoxia    Continue to wean nasal cannula to room air at the SNF.         Seizure disorder    Increased levetiracetam 250 mg TID to 750 mg BID per Neurology recommendations.  EEG on 12/1/17 was normal.        Oropharyngeal dysphagia    Appreciate SLP assistance.  Dental soft diet with nectar thickened liquids.         Polyneuropathy    Gait disorder  Continue  folic acid and B12. PT/OT post-operatively. Hold gabapentin for now due to lethargy.         Mixed Alzheimer's and vascular dementia    Continue donepezil and memantine.  At high risk for delirium.  Delirium precautions.            Final Active Diagnoses:    Diagnosis Date Noted POA    PRINCIPAL PROBLEM:  Closed subcapital fracture of right femur with routine healing [S72.011D] 11/22/2017 Not Applicable    Oral thrush [B37.0] 11/30/2017 No    Acute respiratory failure with hypoxia [J96.01] 11/26/2017 Yes    S/P right hip hemiarthroplasty [Z96.649] 11/23/2017 Not Applicable    Seizure disorder [G40.909] 11/22/2017 Yes     Chronic    Iron deficiency anemia secondary to inadequate dietary iron intake [D50.8] 09/22/2016 Yes    Oropharyngeal dysphagia [R13.12] 06/27/2016 Yes     Chronic    Gait disorder [R26.9] 10/09/2015 Yes    Polyneuropathy [G62.9] 04/17/2015 Yes     Chronic    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, F01.50, F02.80] 03/13/2015 Yes     Chronic    Depression with anxiety [F41.8] 03/13/2015 Yes    Mixed hyperlipidemia [E78.2] 09/02/2014 Yes     Chronic      Problems Resolved During this Admission:    Diagnosis Date Noted Date  Resolved POA    Aspiration pneumonia of both lungs [J69.0] 11/23/2017 12/04/2017 Yes    Diarrhea [R19.7] 12/04/2017 12/06/2017 No    Rash of back [R21] 11/30/2017 12/02/2017 No    Somnolence [R40.0] 11/29/2017 12/03/2017 No    Hypokalemia [E87.6] 11/26/2017 11/29/2017 No    Hypophosphatemia [E83.39] 11/25/2017 11/29/2017 Yes    Fall [W19.XXXA] 11/22/2017 11/28/2017 Yes    Leukocytosis [D72.829] 11/22/2017 11/23/2017 Yes       Discharged Condition: fair    Disposition: Skilled Nursing Facility    Follow Up:  Follow-up Information     Abimael Hallman MD. Go on 12/18/2017.    Specialty:  Orthopedic Surgery  Why:  @ 11am  Contact information:  [address]  [address]  [place] [id number] [phone]             [facility] On 12/6/2017.    Specialties:  Nursing Home Agency, SNF Agency  Why:  SNF  Contact information:  [address]  [place] [address]  [phone]                 Patient Instructions:     Diet Adult Regular   Order Comments: Nectar thickened liquids.  Finely chopped meats, Extra gravy, NO straws.   Order Specific Question Answer Comments   Additional restrictions: Pureed      Vital signs per facility protocol     Skin assessment every shift      Activity: Per rehab recommendations     DNR (Do Not Resuscitate)         Significant Diagnostic Studies: Labs:   CMP   Recent Labs  Lab 12/06/17  0639      K 4.5      CO2 25   *   BUN 14   CREATININE 0.9   CALCIUM 9.5   ANIONGAP 9   ESTGFRAFRICA >60   EGFRNONAA >60   , CBC   Recent Labs  Lab 12/06/17  0639   WBC 10.66   HGB 11.9*   HCT 37.0*   *     Radiology:   Imaging Results          X-Ray Chest AP Portable (Final result)  Result time 12/04/17 13:44:24    Final result by Eder Salter MD (12/04/17 13:44:24)                 Impression:     As above.      Electronically signed by: EDER SALTER MD  Date:     12/04/17  Time:    13:44              Narrative:    Chest AP portable.    Findings: There is a  small amount of bilateral perihilar opacity with interstitial prominence similar to previous examination performed 11/29/17. There is no pneumothorax. The remainder of the examination is unchanged.                             X-Ray Chest AP Portable (Final result)  Result time 11/30/17 08:45:56    Final result by Alexander Stanford DO (11/30/17 08:45:56)                 Impression:        See Above       Electronically signed by: ALEXANDER STANFORD DO  Date:     11/30/17  Time:    08:45              Narrative:    Single portable frontal view of the chest    Comparison: CT and chest x-ray 11/26/2017    Results: Continued ill-defined patchy opacities within the lungs bilaterally left greater than right. There is no significant new lung opacity. No large pleural effusion or pneumothorax. Continued atherosclerotic aorta. Clinical correlation and followup advised                             Fl Modified Barium Swallow Speech (Final result)  Result time 11/27/17 12:40:55    Final result by Roberto Killian MD (11/27/17 12:40:55)                 Impression:    See speech pathology report for further details and recommendations.    Total Fluoroscopy time:The      Electronically signed by: ROBERTO IKLLIAN MD  Date:     11/27/17  Time:    12:40              Narrative:    Modified barium swallow.    Findings: Video fluoroscopic swallowing examination was performed in conjunction with the speech pathology department.  Various liquid and semisolid consistencies were used to assess swallowing.There is trace laryngeal penetration with 10 cc of barium and with continuous swallows through a straw. No laryngeal penetration or aspiration with nectar consistency fluid and pudding and crackers noted. For further details and recommendations please refer to speech pathology note.                             CTA Chest Non-Coronary (Final result)  Result time 11/26/17 11:07:50    Final result by Marcial Baker MD (11/26/17 11:07:50)                  Impression:        No pulmonary embolus identified.    Moderate scattered lung opacification, possible atelectasis with associated edema or pneumonitis. Short term imaging followup could be performed to ensure resolution.      Electronically signed by: Marcial Baker MD  Date:     11/26/17  Time:    11:07              Narrative:    Comparison: None.    Findings: Routine CTA chest protocol performed with the administration of 100 cc of Omnipaque 350 IV contrast. No filling defects in the pulmonary arteries to suggest embolus. There is enlargement of the pulmonary arteries, suggesting some degree of pulmonary hypertension. No pericardial or pleural pleural effusion. There is a moderate amount of scattered groundglass opacification/ consolidation with interstitial thickening.. This is most prominent in the dependent portions of the lower lobes, but involves all lobes. The tracheobronchial tree is clear.Heart size within normal limits. No axillary or mediastinal lymphadenopathy. Thoracic aorta is normal caliber. No dissection. There is a moderate amount of scattered calcified atherosclerotic disease. Thyroid gland is grossly unremarkable.   No marrow replacement process.                             X-Ray Chest 1 View (Final result)  Result time 11/26/17 04:40:33    Final result by Raymon Julio MD (11/26/17 04:40:33)                 Impression:        Suspected interstitial pulmonary edema.  A superimposed infectious or inflammatory possible is possible.      Electronically signed by: RAYMON JULIO MD  Date:     11/26/17  Time:    04:40              Narrative:    Technique: AP chest radiograph.    Comparison: Radiograph 11/23/2017.    Findings:    Mediastinal structures are midline.  Cardiac silhouette is magnified by AP technique.  Lung volumes are low but symmetric.  There has been interval development of slightly increased interstitial markings throughout both lungs with mild associated pulmonary vascular  congestion cough findings is suggested pulmonary edema.  No focal consolidation.  No pneumothorax or pleural effusions.  No free air beneath the diaphragm.  Degenerative changes of the shoulders and spine are noted.                             X-Ray Hip 2 or 3 views Right (Final result)  Result time 11/23/17 11:59:29    Final result by Lyndsay Marx MD (11/23/17 11:59:29)                 Impression:     Post right hip total arthroplasty with satisfactory alignment.      Electronically signed by: LYNDSAY MARX MD  Date:     11/23/17  Time:    11:59              Narrative:      EXAM: Right HIP    CLINICAL INDICATION:  Post op Rt hip john-arthroplasty.    TECHNIQUE: Single AP view the pelvis and frog-leg views of the right hip were obtained.    COMPARISON:Prior from 11/22/17    FINDINGS: There is postsurgical change consistent with total right hip arthroplasty.  Alignment is satisfactory.                             X-Ray Chest AP Portable (Final result)  Result time 11/23/17 11:59:59    Final result by Sukh Sanches MD (11/23/17 11:59:59)                 Impression:      As above        Electronically signed by: SUKH SANCHES MD  Date:     11/23/17  Time:    11:59              Narrative:    Chest AP portable    Indication:Hypoxia    Comparison:11/22/2017    Findings: No pneumothorax.  There has been slight interval increase in patchy increased interstitial and parenchymal attenuation bilaterally, there may be developing consolidation projected over the left lower lung zone noting shallow inspiratory effort on most recent exam.  Clinical correlation for edema versus infection advise.  Remainder the exam is stable.                             CT Lower Extremity Without Cont Right (Final result)  Result time 11/22/17 13:32:00    Final result by Jarek Richardson MD (11/22/17 13:32:00)                 Impression:      Minimally displaced subcapital right femoral neck fracture.      Electronically signed  by: LUPE RICHARDSON MD  Date:     11/22/17  Time:    13:31              Narrative:    Comparison: Right hip radiographs 11/22/17    Technique: Noncontrast CT of the right hip    Findings: There is a minimally displaced and somewhat impacted subcapital right femoral neck fracture.  Otherwise the right hip shows mild degenerative change.  Visualized intrapelvic structures and soft tissues of the right thigh are unremarkable.                             X-Ray Chest AP Portable (Final result)  Result time 11/22/17 10:41:49    Final result by Lupe Richardson MD (11/22/17 10:41:49)                 Impression:      No radiographic evidence of acute chest disease or significant change compared to 9/22/16.        Electronically signed by: LUPE RICHARDSON MD  Date:     11/22/17  Time:    10:41              Narrative:    Time of Procedure: 11/22/17 10:25:00  Accession # [id number]    Technique: Chest radiograph AP    Indication: abnormal blood gas    Comparison: Frontal chest radiograph 9/22/16    Findings:  The mediastinal structures are midline.  The cardiomediastinal silhouette is within normal limits for size and configuration.  The lungs are underinflated with mild prominence of the hilar markings however similar to the prior examination.  No pulmonary consolidation, pleural fluid, or pneumothorax is identified.  Visualized upper abdomen shows no abnormalities.  The bones are normal appearing.                             X-Ray Knee 1 or 2 View Right (Final result)  Result time 11/22/17 11:03:36    Final result by Lupe Richardson MD (11/22/17 11:03:36)                 Impression:      No abnormality is identified.        Electronically signed by: LUPE RICHARDSON MD  Date:     11/22/17  Time:    11:03              Narrative:    Comparison: None    Technique: Frontal and lateral right knee radiographs.    Findings: There is no fracture or dislocation.  Bone mineralization appears adequate.  No significant joint  effusion noted.                             CT Head Without Contrast (Final result)  Result time 11/22/17 10:37:53    Final result by Lupe Richardson MD (11/22/17 10:37:53)                 Impression:       No acute intracranial abnormality is identified.          Electronically signed by: LUPE RICHARDSON MD  Date:     11/22/17  Time:    10:37              Narrative:    Comparison: MRI brain 9/22/16, head CT 9/22/16    Technique: Noncontrast head CT with sagittal and coronal reformats.    Clinical information: Fall    Findings: No intracranial hemorrhage, mass effect, midline shift, or evidence of major vascular territory infarction.  Mild chronic microvascular ischemic changes noted.  There is generalized cerebral atrophy and compensatory prominence of the ventricular system.    The mastoid air cells are clear.  There is patchy opacification of the paranasal sinuses.                             X-Ray Hip 2 View Right (Final result)     Abnormal  Result time 11/22/17 11:08:16    Final result by Christiana Blackburn MD (11/22/17 11:08:16)                 Impression:     There is irregularity of the femoral head next junction which may represent degenerative change however fracture cannot be excluded.  CT or MRI is recommended for further evaluation.    This report has been flagged as abnormal in EPIC.       Electronically signed by: CHRISTIANA BLACKBURN MD  Date:     11/22/17  Time:    11:08              Narrative:    Irregularity at the femoral head neck junction.  There is no evidence of dislocation.                              Cardiac Graphics: Echocardiogram:   2D echo with color flow doppler:   Results for orders placed or performed during the hospital encounter of 11/22/17   2D echo with color flow doppler   Result Value Ref Range    EF 55 55 - 65    Diastolic Dysfunction Yes (A)     Est. PA Systolic Pressure 37.81     Mitral Valve Mobility NORMAL     Tricuspid Valve Regurgitation TRIVIAL        Pending Diagnostic  Studies:     None         Medications:  Reconciled Home Medications:   Discharge Medication List as of 12/7/2017 12:23 PM      START taking these medications    Details   acetaminophen (TYLENOL) 325 MG tablet Take 2 tablets (650 mg total) by mouth every 6 (six) hours as needed for Pain., Starting Wed 12/6/2017, Until Sat 12/16/2017, OTC      enoxaparin (LOVENOX) 40 mg/0.4 mL Syrg Inject 0.4 mLs (40 mg total) into the skin once daily. Until 12/23/17 (or can give oral anticoagulant instead for DVT prophylaxis post-hip repair), Starting Wed 12/6/2017, Until Sat 12/23/2017, No Print      fluconazole (DIFLUCAN) 100 MG tablet Take 1 tablet (100 mg total) by mouth once daily. Until 12/14/17 for oral thrush., Starting Thu 12/7/2017, Until Thu 12/14/2017, No Print      ibuprofen (ADVIL,MOTRIN) 600 MG tablet Take 1 tablet (600 mg total) by mouth every 6 (six) hours as needed (joint pain)., Starting Wed 12/6/2017, Until Sat 12/16/2017, No Print         CONTINUE these medications which have CHANGED    Details   levETIRAcetam (KEPPRA) 750 MG Tab Take 1 tablet (750 mg total) by mouth 2 (two) times daily., Starting Wed 12/6/2017, Normal         CONTINUE these medications which have NOT CHANGED    Details   azelastine (ASTELIN) 137 mcg (0.1 %) nasal spray USE 1-2 SPRAYS IN EACH NASAL TWICE A DAY FOR CONGESTION, Historical Med      citalopram (CELEXA) 20 MG tablet Take 20 mg by mouth every Mon, Wed, Fri. , Starting Mon 10/3/2016, Historical Med      CRESTOR 10 mg tablet Take 10 mg by mouth every Mon, Wed, Fri. , Starting Mon 8/18/2014, Historical Med      cyanocobalamin, vitamin B-12, 2,500 mcg Tab Take 1 tablet by mouth once daily., Historical Med      docusate sodium (COLACE) 100 MG capsule Take 1 capsule (100 mg total) by mouth 2 (two) times daily., Starting 9/24/2016, Until Discontinued, Print      donepezil (ARICEPT) 10 MG tablet TAKE 1 TABLET BY MOUTH EVERY MORNING WITH FOOD, Normal      esomeprazole (NEXIUM) 20 MG capsule  Take 20 mg by mouth once daily. , Starting Fri 6/13/2014, Historical Med      ferrous sulfate 325 mg (65 mg iron) Tab tablet Take 1 tablet by mouth 3 (three) times daily with meals., Starting 4/25/2017, Until Discontinued, Historical Med      folic acid (FOLVITE) 1 MG tablet TAKE 1 TABLET BY MOUTH TWICE A DAY FOR NEUROPATHY, Historical Med      gabapentin (NEURONTIN) 100 MG capsule Take 100 mg by mouth 3 (three) times daily. , Starting Mon 4/3/2017, Historical Med      gemfibrozil (LOPID) 600 MG tablet Take 600 mg by mouth once daily., Starting Tue 11/7/2017, Historical Med      levothyroxine (SYNTHROID) 25 MCG tablet Take 1 tablet (25 mcg total) by mouth before breakfast., Starting Tue 9/27/2016, Until Wed 11/22/2017, Print      memantine (NAMENDA) 10 MG Tab TAKE 1 TABLET BY MOUTH TWICE A DAY, Normal             Indwelling Lines/Drains at time of discharge:   Lines/Drains/Airways          No matching active lines, drains, or airways          Time spent on the discharge of patient: 40 minutes  Patient was seen and examined on the date of discharge and determined to be suitable for discharge.         Dimitrios Duke MD  Department of Hospital Medicine  [facility]

## 2017-12-08 NOTE — ASSESSMENT & PLAN NOTE
S/p right hip hemiarthroplasty  Fall  Dr. Hallman performed hemiarthroplasty 11/23/17. Turn q2. Acetaminophen and ibuprofen prn for pain control. Docusate BID. Lovenox for DVT ppx. PT/OT.

## 2018-02-02 ENCOUNTER — TELEPHONE (OUTPATIENT)
Dept: NEUROLOGY | Facility: CLINIC | Age: 73
End: 2018-02-02

## 2018-02-02 NOTE — TELEPHONE ENCOUNTER
----- Message from Ofelia Hercules sent at 2/2/2018  3:58 PM CST -----  Contact: ANITHA MAGUIRE [[id number]]  x_  1st Request  _  2nd Request  _  3rd Request        Who: ANITHA MAGUIRE [[id number]]    Why: Requesting a call back in regards to a personal matter, please call her.     What Number to Call Back: [phone]    When to Expect a call back: (Within 24 hours)    Please return the call at earliest convenience. Thanks!

## 2018-03-13 NOTE — PLAN OF CARE
Problem: Nutrition, Imbalanced: Inadequate Oral Intake (Adult)  Goal: Improved Oral Intake  Patient will demonstrate the desired outcomes by discharge/transition of care.  Outcome: Ongoing (interventions implemented as appropriate)  Recommendation/Intervention:   Day 2 calorie count= 597 kcal & 24g protein. Day 2 calorie count= 1196 kcal & 39g protein. Pt estimated needs: 2068 kcal & 71-89g protein.     1. Continue to encourage po intake of meals & Boost.   2. If pt continues with inadequate po intake, may benefot from PEG tube.    Goals:   >85% of EEN, EPPN  Nutrition Goal Status: progressing towards goal  Communication of RD Recs: reviewed with RN         History of surgery  left shoulder sx  c section x 2  right upper lobectomy  right foot sx

## 2024-06-03 NOTE — PROGRESS NOTES
The Sw faxed info on the pt to [facility] and [facility] via [employer].   Consent (Scalp)/Introductory Paragraph: The rationale for Mohs was explained to the patient and consent was obtained. The risks, benefits and alternatives to therapy were discussed in detail. Specifically, the risks of changes in hair growth pattern secondary to repair, infection, scarring, bleeding, prolonged wound healing, incomplete removal, allergy to anesthesia, nerve injury and recurrence were addressed. Prior to the procedure, the treatment site was clearly identified and confirmed by the patient. All components of Universal Protocol/PAUSE Rule completed.

## 2025-01-16 NOTE — PLAN OF CARE
Problem: Patient Care Overview  Goal: Plan of Care Review  Outcome: Ongoing (interventions implemented as appropriate)  Patient on documented O2, no respiratory distress noted. Will continue to monitor.         Impaired gait/Weakness

## (undated) DEVICE — ALCOHOL 70% ISOP W/GREEN 16OZ

## (undated) DEVICE — SEE MEDLINE ITEM 152622

## (undated) DEVICE — UNDERGLOVES BIOGEL PI SIZE 7.5

## (undated) DEVICE — SEE MEDLINE ITEM 157116

## (undated) DEVICE — SYS CLSR DERMABOND PRINEO 22CM

## (undated) DEVICE — ELECTRODE REM PLYHSV RETURN 9

## (undated) DEVICE — SEE MEDLINE ITEM 157117

## (undated) DEVICE — SEE MEDLINE ITEM 153151

## (undated) DEVICE — SEE MEDLINE ITEM 146292

## (undated) DEVICE — SUT CTD VICRYL CT-1 27

## (undated) DEVICE — SOL IRR NACL .9% 3000ML

## (undated) DEVICE — SPONGE LAP 18X18 PREWASHED

## (undated) DEVICE — ORTHOCORDVIOLET OS-6 36

## (undated) DEVICE — DRAPE TIBURON ORTHOPEDIC SPLIT

## (undated) DEVICE — SUPPORT ULNA NERVE PROTECTOR

## (undated) DEVICE — BLADE SURG CARBON STEEL #10

## (undated) DEVICE — TAPE SURG MEDIPORE 6X72IN

## (undated) DEVICE — PILLOW ABDUCTION FOAM MED

## (undated) DEVICE — HOOD T-5 TEAR AWAY STERILE

## (undated) DEVICE — SEE MEDLINE ITEM 156955

## (undated) DEVICE — SUT STRATAFIX PGAPCL 3 FS-1

## (undated) DEVICE — GAUZE SPONGE 4X4 12PLY

## (undated) DEVICE — PACK OPTIMA GEN ORTHO

## (undated) DEVICE — SYRINGE 0.9% NACL 10MIL PREFIL

## (undated) DEVICE — DRAPE INCISE IOBAN 2 23X33IN

## (undated) DEVICE — APPLICATOR CHLORAPREP ORN 26ML

## (undated) DEVICE — COVER BACK TABLE 72X21

## (undated) DEVICE — SUT 2/0 36IN COATED VICRYL

## (undated) DEVICE — COVER OVERHEAD SURG LT BLUE

## (undated) DEVICE — DRAPE STERI U-SHAPED 47X51IN

## (undated) DEVICE — DRAPE HIP TIBURON 87X115X134

## (undated) DEVICE — DRAPE STERI INSTRUMENT 1018

## (undated) DEVICE — DRESSING MEPILEX BORDR AG 4X10

## (undated) DEVICE — DRAPE PLASTIC U 60X72

## (undated) DEVICE — GLOVE SURGICAL LATEX SZ 8

## (undated) DEVICE — ELECTRODE BLADE TEFLON 6

## (undated) DEVICE — SEE MEDLINE ITEM 157131

## (undated) DEVICE — MANIFOLD 4 PORT

## (undated) DEVICE — PAD ABDOMINAL 5X9 STERILE

## (undated) DEVICE — SEE MEDLINE ITEM 152530